# Patient Record
Sex: FEMALE | Race: WHITE | NOT HISPANIC OR LATINO | URBAN - METROPOLITAN AREA
[De-identification: names, ages, dates, MRNs, and addresses within clinical notes are randomized per-mention and may not be internally consistent; named-entity substitution may affect disease eponyms.]

---

## 2017-01-13 ENCOUNTER — APPOINTMENT (RX ONLY)
Dept: URBAN - METROPOLITAN AREA CLINIC 24 | Facility: CLINIC | Age: 77
Setting detail: DERMATOLOGY
End: 2017-01-13

## 2017-01-13 DIAGNOSIS — L82.0 INFLAMED SEBORRHEIC KERATOSIS: ICD-10-CM

## 2017-01-13 DIAGNOSIS — L57.0 ACTINIC KERATOSIS: ICD-10-CM

## 2017-01-13 PROBLEM — I25.10 ATHEROSCLEROTIC HEART DISEASE OF NATIVE CORONARY ARTERY WITHOUT ANGINA PECTORIS: Status: ACTIVE | Noted: 2017-01-13

## 2017-01-13 PROCEDURE — ? OTHER

## 2017-01-13 PROCEDURE — ? LIQUID NITROGEN

## 2017-01-13 PROCEDURE — 17110 DESTRUCTION B9 LES UP TO 14: CPT

## 2017-01-13 PROCEDURE — 17000 DESTRUCT PREMALG LESION: CPT | Mod: 59

## 2017-01-13 PROCEDURE — 17003 DESTRUCT PREMALG LES 2-14: CPT

## 2017-01-13 ASSESSMENT — LOCATION DETAILED DESCRIPTION DERM
LOCATION DETAILED: RIGHT DISTAL RADIAL DORSAL FOREARM
LOCATION DETAILED: RIGHT PROXIMAL RADIAL DORSAL FOREARM
LOCATION DETAILED: LEFT DISTAL PRETIBIAL REGION
LOCATION DETAILED: RIGHT PROXIMAL DORSAL FOREARM

## 2017-01-13 ASSESSMENT — LOCATION SIMPLE DESCRIPTION DERM
LOCATION SIMPLE: LEFT PRETIBIAL REGION
LOCATION SIMPLE: RIGHT FOREARM

## 2017-01-13 ASSESSMENT — LOCATION ZONE DERM
LOCATION ZONE: LEG
LOCATION ZONE: ARM

## 2017-01-13 NOTE — PROCEDURE: OTHER
Detail Level: Simple
Note Text (......Xxx Chief Complaint.): This diagnosis correlates with the
Other (Free Text): We plan on serial cryo to these in addition to any AKs not cleared by efudex

## 2017-01-13 NOTE — PROCEDURE: LIQUID NITROGEN
Render Post-Care Instructions In Note?: no
Duration Of Freeze Thaw-Cycle (Seconds): 5
Medical Necessity Clause: This procedure was medically necessary because the lesions was irritated and itchy.inflamed, bleeding.
Consent: The patient's consent was obtained including but not limited to risks of crusting, scabbing, blistering, scarring, darker or lighter pigmentary change, recurrence, incomplete removal and infection.
Post-Care Instructions: I reviewed with the patient in detail post-care instructions. Patient is to wear sunprotection, and avoid picking at any of the treated lesions. Pt may apply Vaseline to crusted or scabbing areas.
Medical Necessity Information: It is in your best interest to select a reason for this procedure from the list below. All of these items fulfill various CMS LCD requirements except the new and changing color options.
Detail Level: Simple
Duration Of Freeze Thaw-Cycle (Seconds): 3
Detail Level: Detailed
Number Of Freeze-Thaw Cycles: 1 freeze-thaw cycle

## 2017-05-18 ENCOUNTER — HOSPITAL ENCOUNTER (OUTPATIENT)
Dept: MAMMOGRAPHY | Age: 77
Discharge: HOME | End: 2017-05-18
Attending: INTERNAL MEDICINE
Payer: MEDICARE

## 2017-05-18 DIAGNOSIS — Z12.31 VISIT FOR SCREENING MAMMOGRAM: ICD-10-CM

## 2017-05-18 PROCEDURE — 77067 SCR MAMMO BI INCL CAD: CPT

## 2018-05-08 PROBLEM — Z79.01 LONG TERM (CURRENT) USE OF ANTICOAGULANTS: Status: ACTIVE | Noted: 2018-05-08

## 2018-10-17 ENCOUNTER — HOSPITAL ENCOUNTER (OUTPATIENT)
Dept: LAB | Age: 78
Discharge: HOME | End: 2018-10-17
Attending: INTERNAL MEDICINE
Payer: MEDICARE

## 2018-10-17 DIAGNOSIS — I48.0 PAROXYSMAL ATRIAL FIBRILLATION (HCC): ICD-10-CM

## 2018-10-17 LAB
INR PPP: 3.6
PROTHROMBIN TIME: 35 SEC (ref 11.5–14.5)

## 2018-10-17 PROCEDURE — 36415 COLL VENOUS BLD VENIPUNCTURE: CPT

## 2018-10-17 PROCEDURE — 85610 PROTHROMBIN TIME: CPT

## 2019-07-19 ENCOUNTER — HOSPITAL ENCOUNTER (EMERGENCY)
Age: 79
Discharge: HOME | End: 2019-07-19
Attending: EMERGENCY MEDICINE
Payer: MEDICARE

## 2019-07-19 VITALS
RESPIRATION RATE: 20 BRPM | OXYGEN SATURATION: 97 % | TEMPERATURE: 97.8 F | DIASTOLIC BLOOD PRESSURE: 65 MMHG | BODY MASS INDEX: 24.43 KG/M2 | WEIGHT: 152 LBS | HEIGHT: 66 IN | SYSTOLIC BLOOD PRESSURE: 149 MMHG | HEART RATE: 69 BPM

## 2019-07-19 DIAGNOSIS — R07.89 ATYPICAL CHEST PAIN: Primary | ICD-10-CM

## 2019-07-19 LAB
ALBUMIN SERPL-MCNC: 3.2 G/DL (ref 3.2–4.6)
ALBUMIN/GLOB SERPL: 0.9 {RATIO} (ref 1.2–3.5)
ALP SERPL-CCNC: 69 U/L (ref 50–136)
ALT SERPL-CCNC: 17 U/L (ref 12–65)
ANION GAP SERPL CALC-SCNC: 7 MMOL/L (ref 7–16)
AST SERPL-CCNC: 32 U/L (ref 15–37)
ATRIAL RATE: 70 BPM
BASOPHILS # BLD: 0.1 K/UL (ref 0–0.2)
BASOPHILS NFR BLD: 1 % (ref 0–2)
BILIRUB SERPL-MCNC: 0.5 MG/DL (ref 0.2–1.1)
BUN SERPL-MCNC: 13 MG/DL (ref 8–23)
CALCIUM SERPL-MCNC: 8.6 MG/DL (ref 8.3–10.4)
CALCULATED P AXIS, ECG09: 75 DEGREES
CALCULATED R AXIS, ECG10: -63 DEGREES
CALCULATED T AXIS, ECG11: 94 DEGREES
CHLORIDE SERPL-SCNC: 108 MMOL/L (ref 98–107)
CO2 SERPL-SCNC: 27 MMOL/L (ref 21–32)
CREAT SERPL-MCNC: 0.82 MG/DL (ref 0.6–1)
DIAGNOSIS, 93000: NORMAL
DIFFERENTIAL METHOD BLD: ABNORMAL
EOSINOPHIL # BLD: 0.3 K/UL (ref 0–0.8)
EOSINOPHIL NFR BLD: 5 % (ref 0.5–7.8)
ERYTHROCYTE [DISTWIDTH] IN BLOOD BY AUTOMATED COUNT: 16.9 % (ref 11.9–14.6)
GLOBULIN SER CALC-MCNC: 3.5 G/DL (ref 2.3–3.5)
GLUCOSE SERPL-MCNC: 91 MG/DL (ref 65–100)
HCT VFR BLD AUTO: 35.4 % (ref 35.8–46.3)
HGB BLD-MCNC: 11.1 G/DL (ref 11.7–15.4)
IMM GRANULOCYTES # BLD AUTO: 0 K/UL (ref 0–0.5)
IMM GRANULOCYTES NFR BLD AUTO: 0 % (ref 0–5)
LYMPHOCYTES # BLD: 1.4 K/UL (ref 0.5–4.6)
LYMPHOCYTES NFR BLD: 25 % (ref 13–44)
MCH RBC QN AUTO: 28.3 PG (ref 26.1–32.9)
MCHC RBC AUTO-ENTMCNC: 31.4 G/DL (ref 31.4–35)
MCV RBC AUTO: 90.3 FL (ref 79.6–97.8)
MONOCYTES # BLD: 0.3 K/UL (ref 0.1–1.3)
MONOCYTES NFR BLD: 5 % (ref 4–12)
NEUTS SEG # BLD: 3.7 K/UL (ref 1.7–8.2)
NEUTS SEG NFR BLD: 64 % (ref 43–78)
NRBC # BLD: 0 K/UL (ref 0–0.2)
P-R INTERVAL, ECG05: 136 MS
PLATELET # BLD AUTO: 220 K/UL (ref 150–450)
PMV BLD AUTO: 9.8 FL (ref 9.4–12.3)
POTASSIUM SERPL-SCNC: 3.9 MMOL/L (ref 3.5–5.1)
PROT SERPL-MCNC: 6.7 G/DL (ref 6.3–8.2)
Q-T INTERVAL, ECG07: 496 MS
QRS DURATION, ECG06: 172 MS
QTC CALCULATION (BEZET), ECG08: 535 MS
RBC # BLD AUTO: 3.92 M/UL (ref 4.05–5.2)
SODIUM SERPL-SCNC: 142 MMOL/L (ref 136–145)
TROPONIN I BLD-MCNC: 0 NG/ML (ref 0.02–0.05)
TROPONIN I SERPL-MCNC: <0.02 NG/ML (ref 0.02–0.05)
VENTRICULAR RATE, ECG03: 70 BPM
WBC # BLD AUTO: 5.8 K/UL (ref 4.3–11.1)

## 2019-07-19 PROCEDURE — 84484 ASSAY OF TROPONIN QUANT: CPT

## 2019-07-19 PROCEDURE — 85025 COMPLETE CBC W/AUTO DIFF WBC: CPT

## 2019-07-19 PROCEDURE — 99285 EMERGENCY DEPT VISIT HI MDM: CPT | Performed by: EMERGENCY MEDICINE

## 2019-07-19 PROCEDURE — 80053 COMPREHEN METABOLIC PANEL: CPT

## 2019-07-19 PROCEDURE — 93005 ELECTROCARDIOGRAM TRACING: CPT | Performed by: EMERGENCY MEDICINE

## 2019-07-19 NOTE — ED TRIAGE NOTES
Pt ambulatory to triage without complications. Pt reports left sided chest tightness that is also like a pressure and is intermittent and radiates to left neck and in the mid back/ shoulder blades. Pt states this started at about 0600. Pt has taken 2NTG with decrease in pain, but not resolution of pain.  EKG completed and given to Dr. Brandy Turner.

## 2019-07-19 NOTE — ED NOTES
I have reviewed discharge instructions with the patient and spouse. The patient and spouse verbalized understanding. Patient left ED via Discharge Method: ambulatory to Home with spouse. Opportunity for questions and clarification provided. Patient given 0 scripts. No e-sign        To continue your aftercare when you leave the hospital, you may receive an automated call from our care team to check in on how you are doing. This is a free service and part of our promise to provide the best care and service to meet your aftercare needs.  If you have questions, or wish to unsubscribe from this service please call [phone]. Thank you for Choosing our [facility] Department.

## 2019-07-19 NOTE — DISCHARGE INSTRUCTIONS
As we discussed, we did not find the exact cause of your symptoms today in the emergency department. Therefore, it is important for you to follow up with your primary care doctor for reevaluation. Please return with any fevers, worsening symptoms, or additional concerns.

## 2019-07-19 NOTE — ED PROVIDER NOTES
78-year-old lady presents with concerns about pain that started between 5 and 6 this morning. She said it started as a tightness in her chest.  She said she has a history of 2 previous stents that were several years ago. She has a pacemaker for A. Fib. She denies any fevers or chills but did have some nausea with the symptoms. She denies any actual vomiting and has had no diaphoresis or difficulty breathing. She took 2 nitroglycerin and her pain significantly improved she said it did not completely go away. She said currently it would be a one or 2 out of 10. She said her pain does not radiate anywhere and she did not try any additional medicines to help it. Elements of this note were created using speech recognition software. As such, errors of speech recognition may be present. Past Medical History:   Diagnosis Date    Accelerated hypertension 8/20/2014    Aortic valve insufficiency 8/20/2014    Arrhythmia     at [facility]    Atrial [facility] 8/20/2014    CAD (coronary artery disease)     Cancer (HCC)     basal    Congestive heart failure (CHF) ([facility] 10/29/2015    Coronary atherosclerosis 10/29/2015    GERD (gastroesophageal reflux disease)     Hypertension     Hypokalemia 8/20/2014    Pacemaker 8/20/2014    Thyroid disease     Unstable angina ([facility] 8/20/2014       Past Surgical History:   Procedure Laterality Date    ABDOMEN SURGERY PROC UNLISTED      gallbladder    CARDIAC SURG PROCEDURE UNLIST      card cath stents,at fib ablation    HX BREAST BIOPSY Left     Lt breast approx. 25 yrs ago.     HX GYN      total hysterectomy    HX OTHER SURGICAL      nephrectomy  3rd kidney    HX PACEMAKER           Family History:   Problem Relation Age of Onset    Heart Disease Mother     Heart Disease Brother     Heart Disease Brother     Breast Cancer Neg Hx        Social History     Socioeconomic History    Marital status:      Spouse name: Not on file    Number of children: Not on file    Years of education: Not on file    Highest education level: Not on file   Occupational History    Not on file   Social Needs    Financial resource strain: Not on file    Food insecurity:     Worry: Not on file     Inability: Not on file    Transportation needs:     Medical: Not on file     Non-medical: Not on file   Tobacco Use    Smoking status: Former Smoker     Last attempt to quit: 10/29/1985     Years since quittin.7    Smokeless tobacco: Never Used   Substance and Sexual Activity    Alcohol use: No    Drug use: Not on file    Sexual activity: Not on file   Lifestyle    Physical activity:     Days per week: Not on file     Minutes per session: Not on file    Stress: Not on file   Relationships    Social connections:     Talks on phone: Not on file     Gets together: Not on file     Attends Tenriism service: Not on file     Active member of club or organization: Not on file     Attends meetings of clubs or organizations: Not on file     Relationship status: Not on file    Intimate partner violence:     Fear of current or ex partner: Not on file     Emotionally abused: Not on file     Physically abused: Not on file     Forced sexual activity: Not on file   Other Topics Concern    Not on file   Social History Narrative    Not on file         ALLERGIES: Atorvastatin; Codeine; Dilaudid [hydromorphone]; Morphine; Simvastatin; and Sulfa (sulfonamide antibiotics)    Review of Systems   Constitutional: Negative for chills, diaphoresis and fever. HENT: Negative for congestion, rhinorrhea and sore throat. Eyes: Negative for redness and visual disturbance. Respiratory: Negative for cough, chest tightness, shortness of breath and wheezing. Cardiovascular: Positive for chest pain. Negative for palpitations. Gastrointestinal: Negative for abdominal pain, blood in stool, diarrhea, nausea and vomiting. Endocrine: Negative for polydipsia and polyuria.    Genitourinary: Negative for dysuria and hematuria. Musculoskeletal: Negative for arthralgias, myalgias and neck stiffness. Skin: Negative for rash. Allergic/Immunologic: Negative for environmental allergies and food allergies. Neurological: Negative for dizziness, weakness and headaches. Hematological: Negative for adenopathy. Does not bruise/bleed easily. Psychiatric/Behavioral: Negative for confusion and sleep disturbance. The patient is not nervous/anxious. Vitals:    07/19/19 1017   BP: 150/79   Pulse: 71   Resp: 16   Temp: 97.8 °F (36.6 °C)   SpO2: 98%   Weight: 68.9 kg (152 lb)   Height: 5' 6\" (1.676 m)            Physical Exam   Constitutional: She is oriented to person, place, and time. She appears well-developed and well-nourished. HENT:   Head: Normocephalic and atraumatic. Mouth/Throat: Oropharynx is clear and moist.   Eyes: Pupils are equal, round, and reactive to light. Conjunctivae are normal. Right eye exhibits no discharge. Left eye exhibits no discharge. Neck: No thyromegaly present. Cardiovascular: Normal rate, regular rhythm and normal heart sounds. No murmur heard. Rhythm is paced   Pulmonary/Chest: Effort normal and breath sounds normal.   Abdominal: Soft. Bowel sounds are normal. There is no tenderness. There is no rebound and no guarding. Musculoskeletal: Normal range of motion. She exhibits no edema. Neurological: She is alert and oriented to person, place, and time. She exhibits normal muscle tone. Coordination normal.   Skin: Skin is warm and dry. Psychiatric: She has a normal mood and affect. Her behavior is normal.        MDM  Number of Diagnoses or Management Options  Diagnosis management comments: Initial EKG shows no acute ischemic changes but it is paced. I will check her troponin. 1:59 PM  Repeat blood work is negative. I will discharge her home.          Procedures

## 2019-08-23 ENCOUNTER — HOSPITAL ENCOUNTER (OUTPATIENT)
Dept: LAB | Age: 79
Discharge: HOME | End: 2019-08-23
Attending: INTERNAL MEDICINE
Payer: MEDICARE

## 2019-08-23 DIAGNOSIS — R94.39 ABNORMAL NUCLEAR STRESS TEST: ICD-10-CM

## 2019-08-23 DIAGNOSIS — I25.118 CORONARY ARTERY DISEASE INVOLVING NATIVE CORONARY ARTERY OF NATIVE HEART WITH OTHER FORM OF ANGINA PECTORIS (HCC): ICD-10-CM

## 2019-08-23 LAB
ANION GAP SERPL CALC-SCNC: 7 MMOL/L (ref 7–16)
BUN SERPL-MCNC: 11 MG/DL (ref 8–23)
CALCIUM SERPL-MCNC: 8.9 MG/DL (ref 8.3–10.4)
CHLORIDE SERPL-SCNC: 105 MMOL/L (ref 98–107)
CO2 SERPL-SCNC: 32 MMOL/L (ref 21–32)
CREAT SERPL-MCNC: 0.9 MG/DL (ref 0.6–1)
GLUCOSE SERPL-MCNC: 82 MG/DL (ref 65–100)
POTASSIUM SERPL-SCNC: 3.4 MMOL/L (ref 3.5–5.1)
SODIUM SERPL-SCNC: 144 MMOL/L (ref 136–145)

## 2019-08-23 PROCEDURE — 36415 COLL VENOUS BLD VENIPUNCTURE: CPT

## 2019-08-23 PROCEDURE — 80048 BASIC METABOLIC PNL TOTAL CA: CPT

## 2019-08-27 NOTE — PROGRESS NOTES
Patient pre-assessment complete for [facility] poss with Dr Tonja Pizarro scheduled for 19 at 11am, arrival time 9am. Patient verified using . Patient instructed to bring all home medications in labeled bottles on the day of procedure. NPO status reinforced. Patient informed to take a full dose aspirin 325mg  or 81 mg x 4 on the day of procedure. Patient instructed to HOLD coumadin (last dose 19) & HOLD lasix & losartan/HCTZ in am . Instructed they can take all other medications excluding vitamins & supplements. Patient verbalizes understanding of all instructions & denies any questions at this time.

## 2019-08-28 ENCOUNTER — HOSPITAL ENCOUNTER (OUTPATIENT)
Dept: CARDIAC CATH/INVASIVE PROCEDURES | Age: 79
Discharge: HOME | End: 2019-08-28
Attending: INTERNAL MEDICINE | Admitting: INTERNAL MEDICINE
Payer: MEDICARE

## 2019-08-28 VITALS
DIASTOLIC BLOOD PRESSURE: 65 MMHG | RESPIRATION RATE: 18 BRPM | OXYGEN SATURATION: 98 % | BODY MASS INDEX: 25.71 KG/M2 | SYSTOLIC BLOOD PRESSURE: 140 MMHG | HEART RATE: 71 BPM | WEIGHT: 160 LBS | HEIGHT: 66 IN | TEMPERATURE: 98.1 F

## 2019-08-28 PROBLEM — E78.5 DYSLIPIDEMIA: Chronic | Status: ACTIVE | Noted: 2019-08-28

## 2019-08-28 LAB
ATRIAL RATE: 70 BPM
CALCULATED P AXIS, ECG09: 93 DEGREES
CALCULATED R AXIS, ECG10: -47 DEGREES
CALCULATED T AXIS, ECG11: 89 DEGREES
DIAGNOSIS, 93000: NORMAL
ERYTHROCYTE [DISTWIDTH] IN BLOOD BY AUTOMATED COUNT: 18 % (ref 11.9–14.6)
HCT VFR BLD AUTO: 40.1 % (ref 35.8–46.3)
HGB BLD-MCNC: 12.4 G/DL (ref 11.7–15.4)
INR PPP: 1.5
MCH RBC QN AUTO: 28.4 PG (ref 26.1–32.9)
MCHC RBC AUTO-ENTMCNC: 30.9 G/DL (ref 31.4–35)
MCV RBC AUTO: 91.8 FL (ref 79.6–97.8)
NRBC # BLD: 0 K/UL (ref 0–0.2)
P-R INTERVAL, ECG05: 188 MS
PLATELET # BLD AUTO: 265 K/UL (ref 150–450)
PMV BLD AUTO: 9.6 FL (ref 9.4–12.3)
PROTHROMBIN TIME: 19 SEC (ref 11.7–14.5)
Q-T INTERVAL, ECG07: 512 MS
QRS DURATION, ECG06: 160 MS
QTC CALCULATION (BEZET), ECG08: 552 MS
RBC # BLD AUTO: 4.37 M/UL (ref 4.05–5.2)
VENTRICULAR RATE, ECG03: 70 BPM
WBC # BLD AUTO: 8.3 K/UL (ref 4.3–11.1)

## 2019-08-28 PROCEDURE — 85027 COMPLETE CBC AUTOMATED: CPT

## 2019-08-28 PROCEDURE — C1769 GUIDE WIRE: HCPCS

## 2019-08-28 PROCEDURE — 85610 PROTHROMBIN TIME: CPT

## 2019-08-28 PROCEDURE — 74011636320 HC RX REV CODE- 636/320: Performed by: INTERNAL MEDICINE

## 2019-08-28 PROCEDURE — 74011250637 HC RX REV CODE- 250/637: Performed by: INTERNAL MEDICINE

## 2019-08-28 PROCEDURE — 77030015766

## 2019-08-28 PROCEDURE — 74011250636 HC RX REV CODE- 250/636: Performed by: INTERNAL MEDICINE

## 2019-08-28 PROCEDURE — 74011250636 HC RX REV CODE- 250/636

## 2019-08-28 PROCEDURE — 93005 ELECTROCARDIOGRAM TRACING: CPT | Performed by: INTERNAL MEDICINE

## 2019-08-28 PROCEDURE — 99152 MOD SED SAME PHYS/QHP 5/>YRS: CPT

## 2019-08-28 PROCEDURE — C1894 INTRO/SHEATH, NON-LASER: HCPCS

## 2019-08-28 PROCEDURE — 99153 MOD SED SAME PHYS/QHP EA: CPT

## 2019-08-28 PROCEDURE — 77030019569 HC BND COMPR RAD TERU -B

## 2019-08-28 PROCEDURE — 77030004534 HC CATH ANGI DX INFN CARD -A

## 2019-08-28 PROCEDURE — 74011000250 HC RX REV CODE- 250: Performed by: INTERNAL MEDICINE

## 2019-08-28 PROCEDURE — 93458 L HRT ARTERY/VENTRICLE ANGIO: CPT

## 2019-08-28 RX ORDER — SODIUM CHLORIDE 0.9 % (FLUSH) 0.9 %
5-40 SYRINGE (ML) INJECTION AS NEEDED
Status: DISCONTINUED | OUTPATIENT
Start: 2019-08-28 | End: 2019-08-28 | Stop reason: HOSPADM

## 2019-08-28 RX ORDER — LIDOCAINE HYDROCHLORIDE 10 MG/ML
2-20 INJECTION INFILTRATION; PERINEURAL
Status: DISCONTINUED | OUTPATIENT
Start: 2019-08-28 | End: 2019-08-28 | Stop reason: HOSPADM

## 2019-08-28 RX ORDER — SODIUM CHLORIDE 0.9 % (FLUSH) 0.9 %
5-40 SYRINGE (ML) INJECTION EVERY 8 HOURS
Status: DISCONTINUED | OUTPATIENT
Start: 2019-08-28 | End: 2019-08-28 | Stop reason: HOSPADM

## 2019-08-28 RX ORDER — MIDAZOLAM HYDROCHLORIDE 1 MG/ML
.5-2 INJECTION, SOLUTION INTRAMUSCULAR; INTRAVENOUS
Status: DISCONTINUED | OUTPATIENT
Start: 2019-08-28 | End: 2019-08-28 | Stop reason: HOSPADM

## 2019-08-28 RX ORDER — GUAIFENESIN 100 MG/5ML
81-324 LIQUID (ML) ORAL ONCE
Status: DISCONTINUED | OUTPATIENT
Start: 2019-08-28 | End: 2019-08-28 | Stop reason: HOSPADM

## 2019-08-28 RX ORDER — DIAZEPAM 5 MG/1
5 TABLET ORAL ONCE
Status: COMPLETED | OUTPATIENT
Start: 2019-08-28 | End: 2019-08-28

## 2019-08-28 RX ORDER — FENTANYL CITRATE 50 UG/ML
25-100 INJECTION, SOLUTION INTRAMUSCULAR; INTRAVENOUS
Status: DISCONTINUED | OUTPATIENT
Start: 2019-08-28 | End: 2019-08-28 | Stop reason: HOSPADM

## 2019-08-28 RX ORDER — SODIUM CHLORIDE 9 MG/ML
75 INJECTION, SOLUTION INTRAVENOUS CONTINUOUS
Status: DISCONTINUED | OUTPATIENT
Start: 2019-08-28 | End: 2019-08-28 | Stop reason: HOSPADM

## 2019-08-28 RX ORDER — SODIUM CHLORIDE 9 MG/ML
250 INJECTION, SOLUTION INTRAVENOUS CONTINUOUS
Status: CANCELLED | OUTPATIENT
Start: 2019-08-28 | End: 2019-08-28

## 2019-08-28 RX ORDER — SODIUM CHLORIDE 0.9 % (FLUSH) 0.9 %
5-40 SYRINGE (ML) INJECTION EVERY 8 HOURS
Status: CANCELLED | OUTPATIENT
Start: 2019-08-28

## 2019-08-28 RX ORDER — HEPARIN SODIUM 200 [USP'U]/100ML
2 INJECTION, SOLUTION INTRAVENOUS CONTINUOUS
Status: DISCONTINUED | OUTPATIENT
Start: 2019-08-28 | End: 2019-08-28 | Stop reason: HOSPADM

## 2019-08-28 RX ORDER — SODIUM CHLORIDE 0.9 % (FLUSH) 0.9 %
5-40 SYRINGE (ML) INJECTION AS NEEDED
Status: CANCELLED | OUTPATIENT
Start: 2019-08-28

## 2019-08-28 RX ORDER — SODIUM CHLORIDE 9 MG/ML
250 INJECTION, SOLUTION INTRAVENOUS CONTINUOUS
Status: ACTIVE | OUTPATIENT
Start: 2019-08-28 | End: 2019-08-28

## 2019-08-28 RX ADMIN — FENTANYL CITRATE 50 MCG: 50 INJECTION, SOLUTION INTRAMUSCULAR; INTRAVENOUS at 11:21

## 2019-08-28 RX ADMIN — IOPAMIDOL 60 ML: 755 INJECTION, SOLUTION INTRAVENOUS at 11:37

## 2019-08-28 RX ADMIN — HEPARIN SODIUM 2 ML: 10000 INJECTION, SOLUTION INTRAVENOUS; SUBCUTANEOUS at 11:30

## 2019-08-28 RX ADMIN — LIDOCAINE HYDROCHLORIDE 4 ML: 10 INJECTION, SOLUTION INFILTRATION; PERINEURAL at 11:28

## 2019-08-28 RX ADMIN — HEPARIN SODIUM 2 ML/HR: 200 INJECTION, SOLUTION INTRAVENOUS at 11:18

## 2019-08-28 RX ADMIN — SODIUM CHLORIDE 75 ML/HR: 900 INJECTION, SOLUTION INTRAVENOUS at 09:37

## 2019-08-28 RX ADMIN — DIAZEPAM 5 MG: 5 TABLET ORAL at 09:44

## 2019-08-28 RX ADMIN — MIDAZOLAM HYDROCHLORIDE 2 MG: 1 INJECTION, SOLUTION INTRAMUSCULAR; INTRAVENOUS at 11:21

## 2019-08-28 RX ADMIN — MIDAZOLAM HYDROCHLORIDE 2 MG: 1 INJECTION, SOLUTION INTRAMUSCULAR; INTRAVENOUS at 11:30

## 2019-08-28 NOTE — PROGRESS NOTES
TRANSFER - OUT REPORT:    Verbal report given to Brianda RN(name) on [facility]  being transferred to [address] [place] [address] (unit) for routine progression of care       Report consisted of patients Situation, Background, Assessment and   Recommendations(SBAR). Information from the following report(s) Procedure Summary, MAR and Cardiac Rhythm NSR was reviewed with the receiving nurse. Lines:   Peripheral IV 08/28/19 Left Antecubital (Active)        Opportunity for questions and clarification was provided.       Patient transported with:   Registered Nurse     [facility] Edna Dickinson only  Right radial access - TR band @ 1139 w/ 13 ml air in band - site C/D/I  Versed 4 mg IV  Fent 50 mcg IV

## 2019-08-28 NOTE — PROGRESS NOTES
TRANSFER - IN REPORT:    Verbal report received from carly RN(name) on [facility]  being received from cath lab(unit) for routine progression of care      Report consisted of patients Situation, Background, Assessment and   Recommendations(SBAR). Information from the following report(s) Procedure Summary was reviewed with the receiving nurse. Opportunity for questions and clarification was provided. Assessment completed upon patients arrival to unit and care assumed.

## 2019-08-28 NOTE — PROGRESS NOTES
Pt arrived, ambulated to room with no visible problems, [facility] Consent signed, Procedure discussed with pt all questions answered voiced understanding. Medications and history discussed with pt. Pt prepped per ordersThe patient has a fraility score of 4-VULNERABLE, based on ability to complete ADLs without assistance, increased symptoms on exertion.       Patient took Aspirin 324mg today at 0800 prior to arrival.

## 2019-08-28 NOTE — DISCHARGE INSTRUCTIONS
HEART CATHETERIZATION/ANGIOGRAPHY DISCHARGE INSTRUCTIONS    1. Check puncture site frequently for swelling or bleeding. If there is any bleeding, lie down and apply pressure over the area with a clean towel or washcloth. Call 911. Notify your doctor for any redness, swelling, drainage, or oozing from the puncture site. Notify your doctor for any fever or chills. 2. If the extremity becomes cold, numb, or painful call [address] Cardiology at [phone].  3. Activity should be limited for the next 48 hours. Climb stairs as little as possible and avoid any stooping, bending, or strenuous activity for 48 hours. No heavy lifting (anything over 10 pounds) for 3 days. 4. You may resume your usual diet. Drink more fluids than usual.  5. Have a responsible person drive you home and stay with you for at least 24 hours after your heart catheterization/angiography. Do not drive for the next 24 hours. 6. You may remove bandage from your Right wrist in 24 hours. You may shower in 24 hours. No tub baths, hot tubs, or swimming for 1 week. Do not place any lotions, creams, powders, or ointments over puncture site for 1 week. You may place a clean band-aid over the puncture site each day for 5 days. Change daily. 7. Please continue your medications as prescribed by your physician. I have read the above instructions and have had the opportunity to ask questions.       Patient: ________________________   Date: 8/28/2019    Witness: _______________________   Date: 8/28/2019

## 2019-08-28 NOTE — PROCEDURES
Brief Cardiac Procedure Note    Patient: Paige Elias MRN: [id number]  SSN: [id number]    YOB: 1940  Age: 78 y.o. Sex: female      Date of Procedure: 8/28/2019     Pre-procedure Diagnosis: Atypical Angina    Post-procedure Diagnosis: Non-cardiac Chest Pain    Procedure: Left Heart Catheterization    Brief Description of Procedure: See note    Performed By: Heriberto Styles MD     Assistants: None    Anesthesia: Moderate Sedation    Estimated Blood Loss: Less than 10 mL      Specimens: None    Implants: None    Findings:   LV:  EF 65%  LM:  NML  LAD:  Stent in prox/mid is patent, 30-40% distal   LCx:  NML  RCA:  NML    Complications: None    Recommendations: Continue medical therapy.     Signed By: Heriberto Styles MD     August 28, 2019

## 2019-08-28 NOTE — H&P
[facility] CARDIOLOGY History &Physical                Primary Cardiologist: Dr Valeria Vázquez    Primary Care Physician:  Antoinette Mack MD    Subjective:     Patient is a 78 y.o. female presents with CP and stress testing with anterior ischemia. She is referred for cardiac catheterization. She has a history of CAD and prior PCI.      Past Medical History:   Diagnosis Date    Accelerated hypertension 8/20/2014    Aortic valve insufficiency 8/20/2014    Arrhythmia     at fib    Arthritis     [facility] 8/20/2014    CAD (coronary artery disease)     Cancer (HCC)     basal    Congestive heart failure (CHF) ([facility] 10/29/2015    Coronary atherosclerosis 10/29/2015    GERD (gastroesophageal reflux disease)     Hypertension     Hypokalemia 8/20/2014    Nausea & vomiting     Pacemaker 8/20/2014    Thyroid disease     Unstable angina (HCC) 8/20/2014        Current Facility-Administered Medications:     0.9% sodium chloride infusion, 75 mL/hr, IntraVENous, CONTINUOUS, Joel Chavez MD, Last Rate: 75 mL/hr at 08/28/19 0937, 75 mL/hr at 08/28/19 7863    aspirin chewable tablet  mg,  mg, Oral, ONCE, Nikhil Dhaliwal MD, Stopped at 08/28/19 1000    fentaNYL citrate (PF) injection  mcg,  mcg, IntraVENous, Multiple, Joel Chavez MD, 50 mcg at 08/28/19 1121    heparin (PF) 2 units/ml in NS infusion, 2 mL/hr, IntraarTERial, CONTINUOUS, Joel Chavez MD, Last Rate: 2 mL/hr at 08/28/19 1118, 2 mL/hr at 08/28/19 1118    iopamidol (ISOVUE-370) 76 % injection  mL,  mL, IntraarTERial, Scott Ochoa Mark A, MD, 60 mL at 08/28/19 1137    lidocaine (XYLOCAINE) 10 mg/mL (1 %) injection 2-20 mL, 2-20 mL, IntraDERMal, Scott Ochoa Mark A, MD, 4 mL at 08/28/19 1128    midazolam (VERSED) injection 0.5-2 mg, 0.5-2 mg, IntraVENous, Scott Ochoa Mark A, MD, 2 mg at 08/28/19 1130  Allergies   Allergen Reactions    Atorvastatin Myalgia    Codeine Nausea Only    Dilaudid [Hydromorphone] Unknown (comments)    Morphine Unknown (comments)    Simvastatin Myalgia    Sulfa (Sulfonamide Antibiotics) Swelling     Social History     Tobacco Use    Smoking status: Former Smoker     Last attempt to quit: 10/29/1985     Years since quittin.8    Smokeless tobacco: Never Used   Substance Use Topics    Alcohol use: No      Family History   Problem Relation Age of Onset    Heart Disease Mother     Heart Disease Brother     Heart Disease Brother     Breast Cancer Neg Hx         Review of Systems    Review of Systems   Constitution: Positive for malaise/fatigue. Negative for chills and fever. HENT: Negative for hearing loss. Eyes: Negative for visual disturbance. Cardiovascular: Positive for chest pain. Negative for dyspnea on exertion and palpitations. Respiratory: Negative for cough, shortness of breath and wheezing. Skin: Negative for rash. Musculoskeletal: Negative for back pain, muscle cramps and muscle weakness. Gastrointestinal: Negative for constipation, diarrhea and nausea. Genitourinary: Negative for dysuria. Neurological: Negative for dizziness and headaches. Psychiatric/Behavioral: Negative for depression. Objective:       Visit Vitals  /63 (BP 1 Location: Left arm, BP Patient Position: At rest)   Pulse 69   Temp 98.1 °F (36.7 °C)   Resp 18   Ht 5' 6\" (1.676 m)   Wt 72.6 kg (160 lb)   SpO2 99%   Breastfeeding? No   BMI 25.82 kg/m²       No intake/output data recorded. No intake/output data recorded. Physical Exam   Constitutional: She is oriented to person, place, and time. She appears well-developed and well-nourished. HENT:   Head: Normocephalic and atraumatic. Eyes: Pupils are equal, round, and reactive to light. Conjunctivae are normal.   Neck: Neck supple. No thyromegaly present. Cardiovascular: Normal rate, regular rhythm and normal heart sounds.    Pulmonary/Chest: Breath sounds normal. Abdominal: Soft. Bowel sounds are normal.   Musculoskeletal: Normal range of motion. Neurological: She is alert and oriented to person, place, and time. Skin: Skin is dry. ECG: AV pacing     Data Review:     Recent Results (from the past 24 hour(s))   CBC W/O DIFF    Collection Time: 08/28/19  9:28 AM   Result Value Ref Range    WBC 8.3 4.3 - 11.1 K/uL    RBC 4.37 4.05 - 5.2 M/uL    HGB 12.4 11.7 - 15.4 g/dL    HCT 40.1 35.8 - 46.3 %    MCV 91.8 79.6 - 97.8 FL    MCH 28.4 26.1 - 32.9 PG    MCHC 30.9 (L) 31.4 - 35.0 g/dL    RDW 18.0 (H) 11.9 - 14.6 %    PLATELET 239 652 - 044 K/uL    MPV 9.6 9.4 - 12.3 FL    ABSOLUTE NRBC 0.00 0.0 - 0.2 K/uL   PROTHROMBIN TIME + INR    Collection Time: 08/28/19  9:28 AM   Result Value Ref Range    Prothrombin time 19.0 (H) 11.7 - 14.5 sec    INR 1.5     EKG, 12 LEAD, INITIAL    Collection Time: 08/28/19  9:42 AM   Result Value Ref Range    Ventricular Rate 70 BPM    Atrial Rate 70 BPM    P-R Interval 188 ms    QRS Duration 160 ms    Q-T Interval 512 ms    QTC Calculation (Bezet) 552 ms    Calculated P Axis 93 degrees    Calculated R Axis -47 degrees    Calculated T Axis 89 degrees    Diagnosis       AV dual-paced rhythm  Abnormal ECG  When compared with ECG of 19-JUL-2019 10:11,  No significant change was found  Confirmed by Kyree Napoles MD (), Jasmin Tim ([id number]) on 8/28/2019 9:47:19 AM             Assessment/Plan:   Principal Problem:    Coronary atherosclerosis (10/29/2015) - Patient with chronic CP and stress testing with anterior ischemia. Plan [facility].   Risks and benefits discussed with patient and willing proceed      Overview: remote LAD stenting   NL LV   last cath patent 2014    Active Problems:    Paroxysmal atrial fibrillation ([address] (8/20/2014) - Sotalol and warfarin       Pacemaker (8/20/2014)      Hypertension (10/29/2015)      Dyslipidemia (8/28/2019)                  Steve Westbrook MD

## 2019-08-28 NOTE — PROCEDURES
[facility]  CARDIAC CATH    Name:  Gustavo Bansal  MR#:  [id number]  :  1940  ACCOUNT #:  [de-identified]  DATE OF SERVICE:  2019    PRIMARY CARDIOLOGIST:  Chester Putnam MD    PRIMARY CARE PHYSICIAN:  Maryanne Yoo MD    BRIEF HISTORY:  The patient is an 55-year-old female with history of atherosclerotic coronary disease, who presents with recurrent chest discomfort. She had stress testing suggesting anterior ischemia, referred for cardiac catheterization. PREOPERATIVE DIAGNOSIS:  Atypical chest pain with moderate risk anterior ischemia. POSTOPERATIVE DIAGNOSIS:  Noncardiac chest pain. PROCEDURES PERFORMED:  Bilateral selective coronary angiography, left ventriculography. SURGEON:  Joel Lopez MD    ASSISTANT:  None. COMPLICATIONS:  None. ANESTHESIA:  Conscious sedation. Start time 11:21, end time 11:40. MEDICATIONS:  4 mg of Versed, 50 mcg of fentanyl. MONITORING RN:  Litzy Barroso. SPECIMENS:  None. IMPLANTS:  None. ESTIMATED BLOOD LOSS:  Less than 5 mL. PROCEDURE:  After informed consent, she was prepped and draped in the usual sterile fashion. Right wrist was infiltrated with lidocaine. Right radial artery was accessed. A 6-Zambian sheath was advanced. A 5-Zambian Tiger catheter was utilized for left and right coronary injections. A 5-Zambian angled pigtail was utilized for left ventriculography. Isovue contrast utilized. FINDINGS:  1. Left ventricle:  Normal left ventricular size. Normal left ventricular systolic function. Ejection fraction is 65%. There is no significant mitral regurgitation. No aortic valve gradient. Left ventricular end-diastolic pressure is measured at 13 mmHg. 2.  Left main:  Left main is large, bifurcates into LAD and circumflex systems, appears angiographically normal.  3.  Left anterior descending coronary: It is a moderate-sized vessel.   Stent that extends from the proximal throughout the mid remains widely patent with less than 20% in-stent restenosis. The jailed proximal diagonal is moderate in size and appears normal.  The more distal LAD has moderate calcification. Has a 20% stenosis at the distal edge of the stent. There is a 30-40% stenosis in the transition to the more apical and relatively small portion of the vessel. 4.  Left circumflex coronary: It is a moderate-sized vessel. It is moderately calcified at the bifurcation of the first obtuse marginal and ongoing AV circumflex. This is associated with no stenosis. The first obtuse marginal is moderate to large in size and appears normal.  The ongoing AV circumflex gives rise to two distal obtuse marginals, which are normal.  5.  Right coronary: It is a moderate-sized anatomically dominant vessel. Has moderate calcification. The lumen is smooth throughout its entire course, gives rise to a moderate-sized posterior descending and small posterolateral branch which appear normal.    Successful hemostasis with pneumatic radial band. CONCLUSIONS:  1. Normal left ventricular systolic function. 2.  Stable-appearing atherosclerotic coronary disease with widely patent LAD stenting, mild to moderate nonobstructive disease involving the mid to distal LAD and normal-appearing left circumflex and right coronary arteries. Thank you for allowing us to participate in the care of this patient. Any questions or concerns, please feel free to contact me.       Izzy Cannon MD      [id number]  D:  08/28/2019 11:54  T:  08/28/2019 12:01  JOB #:  [id number]  CC:  MD Wesly Crain MD

## 2019-09-05 NOTE — PROGRESS NOTES
Called informed pt of low potassium and need to stop the Hyzaar and start on Losartan 100 mg daily. Pt stated the last time she stopped the Hyzaar her feet and ankles started swelling again and had to go back on it. Stated she is taking Potassium Chloride 20 meq once daily. Also has a 10 meq that she is not currently taking. Asking if she can stay on the Hyzaar and just take the Potassium 20 meq in am and 10 meq in the pm or could just take the 20 meq twice daily. Informed pt will speak with Dr. iFfi Crawley and return her call later today or tomorrow.   Pt voiced understanding./wc

## 2019-09-05 NOTE — PROGRESS NOTES
potassium is low   lets have her stop hyzaar 50-12.5   and  start losartan 100mg a day instead.   recheck labs in a couple of weeks

## 2020-07-31 ENCOUNTER — HOSPITAL ENCOUNTER (OUTPATIENT)
Dept: MAMMOGRAPHY | Age: 80
Discharge: HOME | End: 2020-07-31
Attending: INTERNAL MEDICINE
Payer: MEDICARE

## 2020-07-31 DIAGNOSIS — N63.20 LUMP OF LEFT BREAST: ICD-10-CM

## 2020-07-31 DIAGNOSIS — N64.59 ABNORMAL BREAST FINDING: ICD-10-CM

## 2020-07-31 PROCEDURE — 77066 DX MAMMO INCL CAD BI: CPT

## 2020-07-31 PROCEDURE — 76642 ULTRASOUND BREAST LIMITED: CPT

## 2021-07-01 ENCOUNTER — HOSPITAL ENCOUNTER (INPATIENT)
Age: 81
LOS: 3 days | Discharge: HOME | DRG: 872 | End: 2021-07-04
Attending: EMERGENCY MEDICINE | Admitting: HOSPITALIST
Payer: MEDICARE

## 2021-07-01 ENCOUNTER — APPOINTMENT (OUTPATIENT)
Dept: CT IMAGING | Age: 81
DRG: 872 | End: 2021-07-01
Attending: EMERGENCY MEDICINE
Payer: MEDICARE

## 2021-07-01 DIAGNOSIS — Z79.01 CHRONIC ANTICOAGULATION: ICD-10-CM

## 2021-07-01 DIAGNOSIS — D72.829 LEUKOCYTOSIS, UNSPECIFIED TYPE: ICD-10-CM

## 2021-07-01 DIAGNOSIS — R10.32 LLQ ABDOMINAL PAIN: ICD-10-CM

## 2021-07-01 PROBLEM — K57.92 DIVERTICULITIS: Status: ACTIVE | Noted: 2021-07-01

## 2021-07-01 LAB
ALBUMIN SERPL-MCNC: 2.8 G/DL (ref 3.2–4.6)
ALBUMIN/GLOB SERPL: 0.8 {RATIO} (ref 1.2–3.5)
ALP SERPL-CCNC: 88 U/L (ref 50–136)
ALT SERPL-CCNC: 39 U/L (ref 12–65)
ANION GAP SERPL CALC-SCNC: 6 MMOL/L (ref 7–16)
AST SERPL-CCNC: 39 U/L (ref 15–37)
BACTERIA URNS QL MICRO: ABNORMAL /HPF
BASOPHILS # BLD: 0 K/UL (ref 0–0.2)
BASOPHILS NFR BLD: 0 % (ref 0–2)
BILIRUB SERPL-MCNC: 0.8 MG/DL (ref 0.2–1.1)
BUN SERPL-MCNC: 31 MG/DL (ref 8–23)
CALCIUM SERPL-MCNC: 8.5 MG/DL (ref 8.3–10.4)
CASTS URNS QL MICRO: ABNORMAL /LPF
CHLORIDE SERPL-SCNC: 102 MMOL/L (ref 98–107)
CO2 SERPL-SCNC: 29 MMOL/L (ref 21–32)
CREAT SERPL-MCNC: 1.39 MG/DL (ref 0.6–1)
CRYSTALS URNS QL MICRO: 0 /LPF
DIFFERENTIAL METHOD BLD: ABNORMAL
EOSINOPHIL # BLD: 0 K/UL (ref 0–0.8)
EOSINOPHIL NFR BLD: 0 % (ref 0.5–7.8)
EPI CELLS #/AREA URNS HPF: ABNORMAL /HPF
ERYTHROCYTE [DISTWIDTH] IN BLOOD BY AUTOMATED COUNT: 16.3 % (ref 11.9–14.6)
GLOBULIN SER CALC-MCNC: 3.5 G/DL (ref 2.3–3.5)
GLUCOSE SERPL-MCNC: 143 MG/DL (ref 65–100)
HCT VFR BLD AUTO: 39.1 % (ref 35.8–46.3)
HGB BLD-MCNC: 12.4 G/DL (ref 11.7–15.4)
IMM GRANULOCYTES # BLD AUTO: 2.5 K/UL (ref 0–0.5)
IMM GRANULOCYTES NFR BLD AUTO: 6 % (ref 0–5)
INR PPP: 2.2
LACTATE SERPL-SCNC: 3.1 MMOL/L (ref 0.4–2)
LACTATE SERPL-SCNC: 3.2 MMOL/L (ref 0.4–2)
LIPASE SERPL-CCNC: 65 U/L (ref 73–393)
LYMPHOCYTES # BLD: 1.7 K/UL (ref 0.5–4.6)
LYMPHOCYTES NFR BLD: 4 % (ref 13–44)
MCH RBC QN AUTO: 28.6 PG (ref 26.1–32.9)
MCHC RBC AUTO-ENTMCNC: 31.7 G/DL (ref 31.4–35)
MCV RBC AUTO: 90.3 FL (ref 79.6–97.8)
MONOCYTES # BLD: 2.9 K/UL (ref 0.1–1.3)
MONOCYTES NFR BLD: 7 % (ref 4–12)
MUCOUS THREADS URNS QL MICRO: ABNORMAL /LPF
NEUTS SEG # BLD: 34.9 K/UL (ref 1.7–8.2)
NEUTS SEG NFR BLD: 83 % (ref 43–78)
NRBC # BLD: 0.03 K/UL (ref 0–0.2)
OTHER OBSERVATIONS,UCOM: ABNORMAL
PLATELET # BLD AUTO: 207 K/UL (ref 150–450)
PLATELET COMMENTS,PCOM: ADEQUATE
PMV BLD AUTO: 10.4 FL (ref 9.4–12.3)
POTASSIUM SERPL-SCNC: 3.8 MMOL/L (ref 3.5–5.1)
PROT SERPL-MCNC: 6.3 G/DL (ref 6.3–8.2)
PROTHROMBIN TIME: 24.9 SEC (ref 12.5–14.7)
RBC # BLD AUTO: 4.33 M/UL (ref 4.05–5.2)
RBC #/AREA URNS HPF: ABNORMAL /HPF
RBC MORPH BLD: ABNORMAL
RBC MORPH BLD: ABNORMAL
SODIUM SERPL-SCNC: 137 MMOL/L (ref 136–145)
WBC # BLD AUTO: 42 K/UL (ref 4.3–11.1)
WBC MORPH BLD: ABNORMAL
WBC URNS QL MICRO: >100 /HPF

## 2021-07-01 PROCEDURE — 74011000258 HC RX REV CODE- 258: Performed by: HOSPITALIST

## 2021-07-01 PROCEDURE — 85025 COMPLETE CBC W/AUTO DIFF WBC: CPT

## 2021-07-01 PROCEDURE — 81003 URINALYSIS AUTO W/O SCOPE: CPT

## 2021-07-01 PROCEDURE — 99285 EMERGENCY DEPT VISIT HI MDM: CPT

## 2021-07-01 PROCEDURE — 74011250636 HC RX REV CODE- 250/636: Performed by: EMERGENCY MEDICINE

## 2021-07-01 PROCEDURE — 83690 ASSAY OF LIPASE: CPT

## 2021-07-01 PROCEDURE — 87040 BLOOD CULTURE FOR BACTERIA: CPT

## 2021-07-01 PROCEDURE — 74011000258 HC RX REV CODE- 258: Performed by: EMERGENCY MEDICINE

## 2021-07-01 PROCEDURE — 96375 TX/PRO/DX INJ NEW DRUG ADDON: CPT

## 2021-07-01 PROCEDURE — 80053 COMPREHEN METABOLIC PANEL: CPT

## 2021-07-01 PROCEDURE — 93005 ELECTROCARDIOGRAM TRACING: CPT

## 2021-07-01 PROCEDURE — 65270000029 HC RM PRIVATE

## 2021-07-01 PROCEDURE — 81015 MICROSCOPIC EXAM OF URINE: CPT

## 2021-07-01 PROCEDURE — 36415 COLL VENOUS BLD VENIPUNCTURE: CPT

## 2021-07-01 PROCEDURE — 85610 PROTHROMBIN TIME: CPT

## 2021-07-01 PROCEDURE — 83605 ASSAY OF LACTIC ACID: CPT

## 2021-07-01 PROCEDURE — 74177 CT ABD & PELVIS W/CONTRAST: CPT

## 2021-07-01 PROCEDURE — 74011250636 HC RX REV CODE- 250/636: Performed by: HOSPITALIST

## 2021-07-01 PROCEDURE — 96365 THER/PROPH/DIAG IV INF INIT: CPT

## 2021-07-01 PROCEDURE — 74011000636 HC RX REV CODE- 636: Performed by: EMERGENCY MEDICINE

## 2021-07-01 RX ORDER — CLOPIDOGREL BISULFATE 75 MG/1
75 TABLET ORAL DAILY
Status: DISCONTINUED | OUTPATIENT
Start: 2021-07-01 | End: 2021-07-04 | Stop reason: HOSPADM

## 2021-07-01 RX ORDER — SODIUM CHLORIDE 0.9 % (FLUSH) 0.9 %
5-40 SYRINGE (ML) INJECTION EVERY 8 HOURS
Status: DISCONTINUED | OUTPATIENT
Start: 2021-07-01 | End: 2021-07-04 | Stop reason: HOSPADM

## 2021-07-01 RX ORDER — SOTALOL HYDROCHLORIDE 80 MG/1
120 TABLET ORAL 2 TIMES DAILY
Status: DISCONTINUED | OUTPATIENT
Start: 2021-07-01 | End: 2021-07-01 | Stop reason: DRUGHIGH

## 2021-07-01 RX ORDER — POLYETHYLENE GLYCOL 3350 17 G/17G
17 POWDER, FOR SOLUTION ORAL DAILY PRN
Status: DISCONTINUED | OUTPATIENT
Start: 2021-07-01 | End: 2021-07-04 | Stop reason: HOSPADM

## 2021-07-01 RX ORDER — HYDROMORPHONE HYDROCHLORIDE 1 MG/ML
0.3 INJECTION, SOLUTION INTRAMUSCULAR; INTRAVENOUS; SUBCUTANEOUS
Status: COMPLETED | OUTPATIENT
Start: 2021-07-01 | End: 2021-07-01

## 2021-07-01 RX ORDER — SOTALOL HYDROCHLORIDE 80 MG/1
120 TABLET ORAL DAILY
Status: DISCONTINUED | OUTPATIENT
Start: 2021-07-02 | End: 2021-07-02

## 2021-07-01 RX ORDER — SODIUM CHLORIDE 0.9 % (FLUSH) 0.9 %
5-40 SYRINGE (ML) INJECTION AS NEEDED
Status: DISCONTINUED | OUTPATIENT
Start: 2021-07-01 | End: 2021-07-04 | Stop reason: HOSPADM

## 2021-07-01 RX ORDER — ONDANSETRON 2 MG/ML
4 INJECTION INTRAMUSCULAR; INTRAVENOUS
Status: DISCONTINUED | OUTPATIENT
Start: 2021-07-01 | End: 2021-07-04 | Stop reason: HOSPADM

## 2021-07-01 RX ORDER — ACETAMINOPHEN 650 MG/1
650 SUPPOSITORY RECTAL
Status: DISCONTINUED | OUTPATIENT
Start: 2021-07-01 | End: 2021-07-04 | Stop reason: HOSPADM

## 2021-07-01 RX ORDER — HYDROMORPHONE HYDROCHLORIDE 1 MG/ML
0.2 INJECTION, SOLUTION INTRAMUSCULAR; INTRAVENOUS; SUBCUTANEOUS
Status: DISCONTINUED | OUTPATIENT
Start: 2021-07-01 | End: 2021-07-04 | Stop reason: HOSPADM

## 2021-07-01 RX ORDER — ACETAMINOPHEN 325 MG/1
650 TABLET ORAL
Status: DISCONTINUED | OUTPATIENT
Start: 2021-07-01 | End: 2021-07-04 | Stop reason: HOSPADM

## 2021-07-01 RX ORDER — SODIUM CHLORIDE 0.9 % (FLUSH) 0.9 %
5-10 SYRINGE (ML) INJECTION EVERY 8 HOURS
Status: DISCONTINUED | OUTPATIENT
Start: 2021-07-01 | End: 2021-07-04 | Stop reason: HOSPADM

## 2021-07-01 RX ORDER — WARFARIN 2.5 MG/1
2.5 TABLET ORAL EVERY EVENING
Status: DISCONTINUED | OUTPATIENT
Start: 2021-07-01 | End: 2021-07-04 | Stop reason: HOSPADM

## 2021-07-01 RX ORDER — SODIUM CHLORIDE 0.9 % (FLUSH) 0.9 %
10 SYRINGE (ML) INJECTION
Status: COMPLETED | OUTPATIENT
Start: 2021-07-01 | End: 2021-07-01

## 2021-07-01 RX ORDER — ONDANSETRON 2 MG/ML
4 INJECTION INTRAMUSCULAR; INTRAVENOUS
Status: COMPLETED | OUTPATIENT
Start: 2021-07-01 | End: 2021-07-01

## 2021-07-01 RX ORDER — SODIUM CHLORIDE 9 MG/ML
50 INJECTION, SOLUTION INTRAVENOUS CONTINUOUS
Status: DISPENSED | OUTPATIENT
Start: 2021-07-01 | End: 2021-07-02

## 2021-07-01 RX ORDER — SODIUM CHLORIDE 0.9 % (FLUSH) 0.9 %
5-10 SYRINGE (ML) INJECTION AS NEEDED
Status: DISCONTINUED | OUTPATIENT
Start: 2021-07-01 | End: 2021-07-04 | Stop reason: HOSPADM

## 2021-07-01 RX ADMIN — HYDROMORPHONE HYDROCHLORIDE 0.3 MG: 1 INJECTION, SOLUTION INTRAMUSCULAR; INTRAVENOUS; SUBCUTANEOUS at 18:35

## 2021-07-01 RX ADMIN — SODIUM CHLORIDE 100 ML: 900 INJECTION, SOLUTION INTRAVENOUS at 17:42

## 2021-07-01 RX ADMIN — Medication 10 ML: at 21:43

## 2021-07-01 RX ADMIN — SODIUM CHLORIDE 50 ML/HR: 900 INJECTION, SOLUTION INTRAVENOUS at 21:43

## 2021-07-01 RX ADMIN — CEFTRIAXONE 1 G: 1 INJECTION, POWDER, FOR SOLUTION INTRAMUSCULAR; INTRAVENOUS at 17:41

## 2021-07-01 RX ADMIN — ONDANSETRON 4 MG: 2 INJECTION INTRAMUSCULAR; INTRAVENOUS at 17:41

## 2021-07-01 RX ADMIN — Medication 10 ML: at 21:44

## 2021-07-01 RX ADMIN — PIPERACILLIN SODIUM AND TAZOBACTAM SODIUM 3.38 G: 3; .375 INJECTION, POWDER, LYOPHILIZED, FOR SOLUTION INTRAVENOUS at 21:43

## 2021-07-01 RX ADMIN — SODIUM CHLORIDE 1000 ML: 900 INJECTION, SOLUTION INTRAVENOUS at 18:47

## 2021-07-01 RX ADMIN — IOPAMIDOL 100 ML: 755 INJECTION, SOLUTION INTRAVENOUS at 17:42

## 2021-07-01 RX ADMIN — SODIUM CHLORIDE 1000 ML: 900 INJECTION, SOLUTION INTRAVENOUS at 17:40

## 2021-07-01 RX ADMIN — Medication 10 ML: at 17:42

## 2021-07-01 NOTE — ED NOTES
Pt reports she currently receives infusions for psoriasis. Recently finished prednisone prescription. Hx of diverticulitis, gallbladder removal. 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 Denies blood in urine, vomit or stool. Denies diarrhea or constipation.

## 2021-07-01 NOTE — ED PROVIDER NOTES
Is here with abdominal discomfort she first noticed when she woke with it around 1:00 this morning. She has had some nausea and at times had scant amount of vomiting times either 2 or 3. Had a bowel movement that was small. She has had no blood in either her emesis or stool. 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 He has had some belching associated with this. Denies any fever. Urine is quite dark and somewhat cloudy. Aware of any fevers or chills. The history is provided by the patient. Abdominal Pain   This is a new problem. Episode onset: 0100. The problem occurs constantly. The problem has not changed since onset. The pain is located in the suprapubic region. The pain is moderate. Associated symptoms include nausea and vomiting. Pertinent negatives include no fever, no hematochezia, no melena and no dysuria. The pain is relieved by nothing. The patient's surgical history includes cholecystectomy and hysterectomy. 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       Past Medical History:   Diagnosis Date    Accelerated hypertension 8/20/2014    Aortic valve insufficiency 8/20/2014    Arrhythmia     at fib    Arthritis     Atrial fibrillation ([address] 8/20/2014    CAD (coronary artery disease)     Cancer (HCC)     basal    Congestive heart failure (CHF) ([address] 10/29/2015    Coronary atherosclerosis 10/29/2015    GERD (gastroesophageal reflux disease)     Hypertension     Hypokalemia 8/20/2014    Nausea & vomiting     Pacemaker 8/20/2014    Thyroid disease     Unstable angina ([address] 8/20/2014       Past Surgical History:   Procedure Laterality Date    ABDOMEN SURGERY PROC UNLISTED      gallbladder    CARDIAC SURG PROCEDURE UNLIST      card cath stents,at fib ablation    HX BREAST BIOPSY Left     Lt breast approx. 25 yrs ago.     HX GYN      total hysterectomy    HX OTHER SURGICAL nephrectomy  3rd kidney    HX PACEMAKER           Family History:   Problem Relation Age of Onset    Heart Disease Mother     Heart Disease Brother     Heart Disease Brother     Breast Cancer Neg Hx        Social History     Socioeconomic History    Marital status:      Spouse name: Not on file    Number of children: Not on file    Years of education: Not on file    Highest education level: Not on file   Occupational History    Not on file   Tobacco Use    Smoking status: Former Smoker     Quit date: 10/29/1985     Years since quittin.6    Smokeless tobacco: Never Used   Substance and Sexual Activity    Alcohol use: No    Drug use: Not on file    Sexual activity: Not on file   Other Topics Concern    Not on file   Social History Narrative    Not on file     Social Determinants of Health     Financial Resource Strain:     Difficulty of Paying Living Expenses:    Food Insecurity:     Worried About Running Out of Food in the Last Year:     [address] in the Last Year:    Transportation Needs:     Lack of Transportation (Medical):  Lack of Transportation (Non-Medical):    Physical Activity:     Days of Exercise per Week:     Minutes of Exercise per Session:    Stress:     Feeling of Stress :    Social Connections:     Frequency of Communication with Friends and Family:     Frequency of Social Gatherings with Friends and Family:     Attends Rastafarian Services:     Active Member of Clubs or Organizations:     Attends Club or Organization Meetings:     Marital Status:    Intimate Partner Violence:     Fear of Current or Ex-Partner:     Emotionally Abused:     Physically Abused:     Sexually Abused: ALLERGIES: Atorvastatin, Codeine, Dilaudid [hydromorphone], Morphine, Simvastatin, and Sulfa (sulfonamide antibiotics)    Review of Systems   Constitutional: Negative for fever. Gastrointestinal: Positive for abdominal pain, nausea and vomiting.  Negative for hematochezia and melena. Genitourinary: Negative for dysuria. All other systems reviewed and are negative. Vitals:    07/01/21 1534 07/01/21 1654   BP: (!) 94/55    Pulse: 75    Resp: 16    Temp: 98.8 °F (37.1 °C)    SpO2: 95% 95%   Weight: 71.7 kg (158 lb)             Physical Exam  Vitals and nursing note reviewed. Constitutional:       General: She is not in acute distress. Appearance: She is well-developed. HENT:      Head: Atraumatic. Eyes:      General: No scleral icterus. Cardiovascular:      Rate and Rhythm: Normal rate. Pulmonary:      Effort: Pulmonary effort is normal. No respiratory distress. Abdominal:      General: Bowel sounds are normal.      Palpations: Abdomen is soft. Tenderness: There is no guarding or rebound. Hernia: No hernia is present. Musculoskeletal:      Cervical back: Neck supple. Skin:     General: Skin is warm and dry. Neurological:      General: No focal deficit present. Psychiatric:         Mood and Affect: Mood normal.         Behavior: Behavior normal.         Thought Content: Thought content normal.          MDM  Number of Diagnoses or Management Options  Diagnosis management comments: Dramatic leukocytosis and findings of probable UTI and possible diverticulitis. Actually has fairly soft findings regarding each. Abdomen with no peritoneal signs. No real CVAT. Does have urologic history that may increase risk. No known blood dyscrasias. Admitting and possibly cultures will define.  Looks better than would expect by labs       Amount and/or Complexity of Data Reviewed  Clinical lab tests: ordered and reviewed  Tests in the radiology section of CPT®: reviewed and ordered  Obtain history from someone other than the patient: yes  Review and summarize past medical records: yes  Discuss the patient with other providers: yes  Independent visualization of images, tracings, or specimens: yes    Risk of Complications, Morbidity, and/or Mortality  Presenting problems: moderate  Diagnostic procedures: moderate           Procedures

## 2021-07-01 NOTE — ED TRIAGE NOTES
Patient presents via [facility] with complaints of abdominal pain. History of diverticulitis.   112/50  HR 76 97% RA BGL 98

## 2021-07-01 NOTE — H&P
[facility] History and Physical       Name:  Michael Light  Age:81 y.o. Sex:female   :  1940    MRN:  [id number]   PCP:  Ez Matthews MD      Admit Date:  2021  4:34 PM   Chief Complaint: Abdominal pain, nausea, vomiting    Reason for Admission:   Diverticulitis [K57.92]    Assessment & Plan:     Acute sigmoid diverticulitis: Initiated antibiotics, will provide IV hydration gently as patient has history of congestive heart failure. Patient has elevated lactic acid at this time, likely secondary to ongoing infection, will request surgical opinion as white count is disproportionately elevated. Chronic atrial fibrillation: Rate is controlled, will follow continue home medications including sotalol, Coumadin. Dyslipidemia: continue on statins. Hypothyroidism: Continue on levothyroxine. Disposition/Expected LOS: 4  Diet: DIET ADULT  VTE ppx: Patient is on Coumadin  GI ppx: none  Code status: Full Code  Surrogate decision-maker: self      History of Presenting Illness:     78years old female with past medical history of chronic atrial fibrillation on anticoagulation with Coumadin, history of hypertension, coronary artery disease presented to emergency room with chief complaint of nausea, vomiting, abdominal discomfort going on since this morning which continued to get worse. Patient abdominal pain is suprapubic, associated with nausea and 2 episodes of vomiting. No aggravating or relieving factors. Denies any diarrhea. Denies any chest pain, shortness of breath, cough or sputum production. Denies any fever, chills but reports feeling generalized weak. Patient  was at bedside helping with history. Evaluated in the ER, further work-up shows evidence of sigmoid diverticulitis, lab work shows evidence of elevated white count, emergency room physician requested admission of this patient for further evaluation and management.   Patient was given antibiotics in the emergency room. Review of Systems:  A 14 point review of systems was taken and pertinent positive as per HPI. Past Medical History:   Diagnosis Date    Accelerated hypertension 2014    Aortic valve insufficiency 2014    Arrhythmia     at fib    Arthritis     [facility] ([facility] 2014    CAD (coronary artery disease)     Cancer (HCC)     basal    Congest[facility] failure ([facility]F) ([facility] 10/29/2015    Coronary atherosclerosis 10/29/2015    GERD (gastroesophageal reflux disease)     Hypertension     Hypokalemia 2014    Nausea & vomiting     Pacemaker 2014    Thyroid disease     Unstable angina ([facility] 2014       Past Surgical History:   Procedure Laterality Date    ABDOMEN SURGERY PROC UNLISTED      gallbladder    CARDIAC SURG PROCEDURE UNLIST      card cath stents,at fib ablation    HX BREAST BIOPSY Left     Lt breast approx. 25 yrs ago.     HX GYN      total hysterectomy    HX OTHER SURGICAL      nephrectomy  3rd kidney    HX PACEMAKER         Family History : reviewed  Family History   Problem Relation Age of Onset    Heart Disease Mother     Heart Disease Brother     Heart Disease Brother     Breast Cancer Neg Hx         Social History     Tobacco Use    Smoking status: Former Smoker     Quit date: 10/29/1985     Years since quittin.6    Smokeless tobacco: Never Used   Substance Use Topics    Alcohol use: No       Allergies   Allergen Reactions    Atorvastatin Myalgia    Codeine Nausea Only    Dilaudid [Hydromorphone] Unknown (comments)    Morphine Unknown (comments)    Simvastatin Myalgia    Sulfa (Sulfonamide Antibiotics) Swelling       Immunization History   Administered Date(s) Administered    Covid-19, MODERNA, Mrna, Lnp-s, Pf, 100mcg/0.5mL 2021, 2021         PTA Medications:  Current Outpatient Medications   Medication Instructions    acetaminophen (TYLENOL EXTRA STRENGTH) 500 mg tablet Oral, EVERY 6 HOURS AS NEEDED    Cholecalciferol, Vitamin D3, (VITAMIN D) 2,000 unit Cap 2,000 Int'l Units, DAILY    clopidogreL (PLAVIX) 75 mg, Oral, DAILY    ezetimibe (ZETIA) 10 mg, Oral, DAILY    famotidine (PEPCID) 20 mg, Oral, EVERY BEDTIME    fexofenadine (ALLEGRA) 180 mg, Oral, DAILY AS NEEDED    fluticasone (VERAMYST) 27.5 mcg/actuation nasal spray 2 Sprays, Nasal, DAILY, As needed     folic acid (FOLVITE) 1 mg, DAILY    furosemide (LASIX) 40 mg tablet As needed    halobetasol (ULTRAVATE) 0.05 % topical cream Topical, 2 TIMES DAILY, use thin layer     levothyroxine (SYNTHROID) 175 mcg, Oral, DAILY    losartan-hydroCHLOROthiazide (HYZAAR) 50-12.5 mg per tablet 1 Tablet, Oral, DAILY    methotrexate (RHEUMATREX) 2.5 mg, Oral, DAILY, On Tues and Wed    nitroglycerin (NITROSTAT) 0.4 mg, Oral, EVERY 5 MIN AS NEEDED    omeprazole (PRILOSEC) 40 mg, DAILY    pitavastatin (LIVALO) 4 mg tab tablet Oral, DAILY    potassium chloride SA (K-DUR, KLOR-CON M20) 20 mEq tablet 20 mEq, Oral, DAILY    Premarin 0.625 mg, DAILY    sotaloL (BETAPACE) 120 mg, Oral, 2 TIMES DAILY    traMADoL (ULTRAM) 50 mg, Oral, EVERY 6 HOURS AS NEEDED    warfarin (Coumadin) 5 mg tablet 1/2-1 tab po daily or as directed. Objective:     Patient Vitals for the past 24 hrs:   Temp Pulse Resp BP SpO2   07/01/21 1830  76  131/61 97 %   07/01/21 1800  75  137/65 98 %   07/01/21 1657  75  (!) 115/59 95 %   07/01/21 1654     95 %   07/01/21 1534 98.8 °F (37.1 °C) 75 16 (!) 94/55 95 %       Oxygen Therapy  O2 Sat (%): 97 % (07/01/21 1830)  Pulse via Oximetry: 76 beats per minute (07/01/21 1830)  O2 Device: None (Room air) (07/01/21 1654)    Body mass index is 25.5 kg/m².     Physical Exam:    General:  No acute distress, speaking in full sentences  HEENT:  Pupils equal and reactive to light and accommodation, oropharynx is clear   Neck:   Supple, no lymphadenopathy, no JVD   Lungs:  Clear to auscultation bilaterally   CV:   Regular rate and rhythm with normal S1 and S2   Abdomen:  Soft, tender over left lower quadrant, nondistended, normoactive bowel sounds   Extremities:  No cyanosis clubbing or edema   Neuro:  Nonfocal, A&O x3   Psych:  Normal mood and affect       Data Reviewed: I have reviewed all labs, meds, and studies. Recent Results (from the past 24 hour(s))   CBC WITH AUTOMATED DIFF    Collection Time: 07/01/21  3:37 PM   Result Value Ref Range    WBC 42.0 (H) 4.3 - 11.1 K/uL    RBC 4.33 4.05 - 5.2 M/uL    HGB 12.4 11.7 - 15.4 g/dL    HCT 39.1 35.8 - 46.3 %    MCV 90.3 79.6 - 97.8 FL    MCH 28.6 26.1 - 32.9 PG    MCHC 31.7 31.4 - 35.0 g/dL    RDW 16.3 (H) 11.9 - 14.6 %    PLATELET 731 563 - 991 K/uL    MPV 10.4 9.4 - 12.3 FL    ABSOLUTE NRBC 0.03 0.0 - 0.2 K/uL    NEUTROPHILS 83 (H) 43 - 78 %    LYMPHOCYTES 4 (L) 13 - 44 %    MONOCYTES 7 4.0 - 12.0 %    EOSINOPHILS 0 (L) 0.5 - 7.8 %    BASOPHILS 0 0.0 - 2.0 %    IMMATURE GRANULOCYTES 6 (H) 0.0 - 5.0 %    ABS. NEUTROPHILS 34.9 (H) 1.7 - 8.2 K/UL    ABS. LYMPHOCYTES 1.7 0.5 - 4.6 K/UL    ABS. MONOCYTES 2.9 (H) 0.1 - 1.3 K/UL    ABS. EOSINOPHILS 0.0 0.0 - 0.8 K/UL    ABS. BASOPHILS 0.0 0.0 - 0.2 K/UL    ABS. IMM. GRANS.  2.5 (H) 0.0 - 0.5 K/UL    RBC COMMENTS SLIGHT  ANISOCYTOSIS + POIKILOCYTOSIS        RBC COMMENTS OCCASIONAL  POIKILOCYTOSIS        WBC COMMENTS Result Confirmed By Smear      PLATELET COMMENTS ADEQUATE      DF AUTOMATED     METABOLIC PANEL, COMPREHENSIVE    Collection Time: 07/01/21  3:37 PM   Result Value Ref Range    Sodium 137 136 - 145 mmol/L    Potassium 3.8 3.5 - 5.1 mmol/L    Chloride 102 98 - 107 mmol/L    CO2 29 21 - 32 mmol/L    Anion gap 6 (L) 7 - 16 mmol/L    Glucose 143 (H) 65 - 100 mg/dL    BUN 31 (H) 8 - 23 MG/DL    Creatinine 1.39 (H) 0.6 - 1.0 MG/DL    GFR est AA 47 (L) >60 ml/min/1.73m2    GFR est non-AA 38 (L) >60 ml/min/1.73m2    Calcium 8.5 8.3 - 10.4 MG/DL    Bilirubin, total 0.8 0.2 - 1.1 MG/DL    ALT (SGPT) 39 12 - 65 U/L    AST (SGOT) 39 (H) 15 - 37 U/L    Alk. phosphatase 88 50 - 136 U/L    Protein, total 6.3 6.3 - 8.2 g/dL    Albumin 2.8 (L) 3.2 - 4.6 g/dL    Globulin 3.5 2.3 - 3.5 g/dL    A-G Ratio 0.8 (L) 1.2 - 3.5     LIPASE    Collection Time: 07/01/21  3:37 PM   Result Value Ref Range    Lipase 65 (L) 73 - 393 U/L   URINE MICROSCOPIC    Collection Time: 07/01/21  5:05 PM   Result Value Ref Range    WBC >100 0 /hpf    RBC 3-5 0 /hpf    Epithelial cells 3-5 0 /hpf    Bacteria 1+ (H) 0 /hpf    Casts HYALINE 0 /lpf    Crystals, urine 0 0 /LPF    Mucus 1+ (H) 0 /lpf    Other observations RESULTS VERIFIED MANUALLY     PROTHROMBIN TIME + INR    Collection Time: 07/01/21  5:34 PM   Result Value Ref Range    Prothrombin time 24.9 (H) 12.5 - 14.7 sec    INR 2.2     LACTIC ACID    Collection Time: 07/01/21  5:34 PM   Result Value Ref Range    Lactic acid 3.2 (H) 0.4 - 2.0 MMOL/L       EKG Results     None          All Micro Results     None          Other Studies:  CT ABD PELV W CONT    Result Date: 7/1/2021  CT ABDOMEN AND PELVIS WITH CONTRAST HISTORY: Lower abdominal pain COMPARISON: None TECHNIQUE: Helical imaging was performed from the lung bases through the ischial tuberosities during the intravenous infusion of 100 cc of Isovue-370. Oral contrast was not administered. Coronal and sagittal reformats were performed. Dose reduction techniques used: Automated exposure control, adjustment of the mAs and/or kVp according to patient's size, and iterative reconstruction techniques. FINDINGS: *  LUNG BASES: Pacemaker. Mural calcifications of the left atrial *  LIVER: Within normal limits. *  GALLBLADDER AND BILE DUCTS: Cholecystectomy. *  SPLEEN: Within normal limits. *  URINARY TRACT: Status post resection of a duplicate right kidney. *  ADRENALS: Within normal limits. *  PANCREAS: Within normal limits. *  GASTROINTESTINAL TRACT: Sigmoid diverticulosis without evidence of diverticulitis. *  RETROPERITONEUM: Within normal limits.  *  PERITONEAL CAVITY AND ABDOMINAL WALL: Within normal limits. *  PELVIS: Within normal limits. *  SPINE / BONES: Within normal limits. *  OTHER COMMENTS: None. Sigmoid diverticulitis. Mural calcifications of the left atrium of uncertain etiology. She has had a prior ablation Cholecystectomy. Status post resection of a duplicated right kidney.  Date of Dictation: 7/1/2021 5:55 PM         Medications:  Medications Administered      Medications Administered     cefTRIAXone (ROCEPHIN) 1 g in 0.9% sodium chloride (MBP/ADV) 50 mL MBP     Admin Date  07/01/2021 Action  New Bag Dose  1 g Rate  100 mL/hr Route  IntraVENous Administered By  Agustín Connor RN          HYDROmorphone (DILAUDID) injection 0.3 mg     Admin Date  07/01/2021 Action  Given Dose  0.3 mg Route  IntraVENous Administered By  Agustín Connor RN          iopamidoL (ISOVUE-370) 76 % injection 100 mL     Admin Date  07/01/2021 Action  Given Dose  100 mL Route  IntraVENous Administered By  Tony BRADSHAW          ondansetron [facility]) injection 4 mg     Admin Date  07/01/2021 Action  Given Dose  4 mg Route  IntraVENous Administered By  Agustín Connor RN          saline peripheral flush soln 10 mL     Admin Date  07/01/2021 Action  Given Dose  10 mL Route  InterCATHeter Administered By  Tony BRADSHAW          sodium chloride 0.9 % bolus infusion 1,000 mL     Admin Date  07/01/2021 Action  New Bag Dose  1,000 mL Rate  500 mL/hr Route  IntraVENous Administered By  Agustín Connor RN           Admin Date  07/01/2021 Action  New Bag Dose  1,000 mL Rate  500 mL/hr Route  IntraVENous Administered By  Agustín Connor RN          sodium chloride 0.9 % bolus infusion 100 mL     Admin Date  07/01/2021 Action  New Bag Dose  100 mL Rate   Route  IntraVENous Administered By  Santiago Gilliland                      Problem List:     Hospital Problems as of 7/1/2021 Date Reviewed: 2/25/2021        Codes Class Noted - Resolved POA    * (Principal) Diverticulitis ICD-10-CM: K57.92  ICD-9-CM: 562.11  7/1/2021 - Present Unknown        Dyslipidemia (Chronic) ICD-10-CM: E78.5  ICD-9-CM: 272.4  8/28/2019 - Present Yes        Congestive heart failure (CHF) ([facility] ICD-10-CM: I50.9  ICD-9-CM: 428.0  10/29/2015 - Present Yes        Paroxysmal atrial fibrillation ([facility] ICD-10-CM: I48.0  ICD-9-CM: 427.31  8/20/2014 - Present Yes                 Signed By: Tha Maldonado MD   [employer] [facility]    July 1, 2021  4:22 PM

## 2021-07-02 LAB
ALBUMIN SERPL-MCNC: 2.5 G/DL (ref 3.2–4.6)
ALBUMIN/GLOB SERPL: 0.8 {RATIO} (ref 1.2–3.5)
ALP SERPL-CCNC: 78 U/L (ref 50–136)
ALT SERPL-CCNC: 27 U/L (ref 12–65)
ANION GAP SERPL CALC-SCNC: 5 MMOL/L (ref 7–16)
AST SERPL-CCNC: 22 U/L (ref 15–37)
ATRIAL RATE: 70 BPM
BASOPHILS # BLD: 0.1 K/UL (ref 0–0.2)
BASOPHILS NFR BLD: 0 % (ref 0–2)
BILIRUB SERPL-MCNC: 0.7 MG/DL (ref 0.2–1.1)
BUN SERPL-MCNC: 27 MG/DL (ref 8–23)
CALCIUM SERPL-MCNC: 8.1 MG/DL (ref 8.3–10.4)
CALCULATED P AXIS, ECG09: 50 DEGREES
CALCULATED R AXIS, ECG10: -8 DEGREES
CALCULATED T AXIS, ECG11: 118 DEGREES
CHLORIDE SERPL-SCNC: 109 MMOL/L (ref 98–107)
CO2 SERPL-SCNC: 29 MMOL/L (ref 21–32)
CREAT SERPL-MCNC: 0.86 MG/DL (ref 0.6–1)
DIAGNOSIS, 93000: NORMAL
DIFFERENTIAL METHOD BLD: ABNORMAL
EOSINOPHIL # BLD: 0.2 K/UL (ref 0–0.8)
EOSINOPHIL NFR BLD: 1 % (ref 0.5–7.8)
ERYTHROCYTE [DISTWIDTH] IN BLOOD BY AUTOMATED COUNT: 16.8 % (ref 11.9–14.6)
GLOBULIN SER CALC-MCNC: 3.2 G/DL (ref 2.3–3.5)
GLUCOSE BLD STRIP.AUTO-MCNC: 106 MG/DL (ref 65–100)
GLUCOSE SERPL-MCNC: 103 MG/DL (ref 65–100)
HCT VFR BLD AUTO: 34.7 % (ref 35.8–46.3)
HGB BLD-MCNC: 11 G/DL (ref 11.7–15.4)
IMM GRANULOCYTES # BLD AUTO: 1.6 K/UL (ref 0–0.5)
IMM GRANULOCYTES NFR BLD AUTO: 5 % (ref 0–5)
INR PPP: 2.3
LACTATE SERPL-SCNC: 1.5 MMOL/L (ref 0.4–2)
LYMPHOCYTES # BLD: 1.8 K/UL (ref 0.5–4.6)
LYMPHOCYTES NFR BLD: 6 % (ref 13–44)
MAGNESIUM SERPL-MCNC: 2.1 MG/DL (ref 1.8–2.4)
MCH RBC QN AUTO: 28.3 PG (ref 26.1–32.9)
MCHC RBC AUTO-ENTMCNC: 31.7 G/DL (ref 31.4–35)
MCV RBC AUTO: 89.2 FL (ref 79.6–97.8)
MONOCYTES # BLD: 2.9 K/UL (ref 0.1–1.3)
MONOCYTES NFR BLD: 9 % (ref 4–12)
NEUTS SEG # BLD: 24.6 K/UL (ref 1.7–8.2)
NEUTS SEG NFR BLD: 79 % (ref 43–78)
NRBC # BLD: 0 K/UL (ref 0–0.2)
P-R INTERVAL, ECG05: 174 MS
PLATELET # BLD AUTO: 172 K/UL (ref 150–450)
PMV BLD AUTO: 10.5 FL (ref 9.4–12.3)
POTASSIUM SERPL-SCNC: 3.5 MMOL/L (ref 3.5–5.1)
PROT SERPL-MCNC: 5.7 G/DL (ref 6.3–8.2)
PROTHROMBIN TIME: 25.2 SEC (ref 12.5–14.7)
Q-T INTERVAL, ECG07: 464 MS
QRS DURATION, ECG06: 154 MS
QTC CALCULATION (BEZET), ECG08: 501 MS
RBC # BLD AUTO: 3.89 M/UL (ref 4.05–5.2)
SERVICE CMNT-IMP: ABNORMAL
SODIUM SERPL-SCNC: 143 MMOL/L (ref 136–145)
VENTRICULAR RATE, ECG03: 70 BPM
WBC # BLD AUTO: 31.2 K/UL (ref 4.3–11.1)

## 2021-07-02 PROCEDURE — 99222 1ST HOSP IP/OBS MODERATE 55: CPT | Performed by: SURGERY

## 2021-07-02 PROCEDURE — 83605 ASSAY OF LACTIC ACID: CPT

## 2021-07-02 PROCEDURE — 85610 PROTHROMBIN TIME: CPT

## 2021-07-02 PROCEDURE — 82962 GLUCOSE BLOOD TEST: CPT

## 2021-07-02 PROCEDURE — 74011250636 HC RX REV CODE- 250/636: Performed by: FAMILY MEDICINE

## 2021-07-02 PROCEDURE — 85025 COMPLETE CBC W/AUTO DIFF WBC: CPT

## 2021-07-02 PROCEDURE — 65270000029 HC RM PRIVATE

## 2021-07-02 PROCEDURE — 36415 COLL VENOUS BLD VENIPUNCTURE: CPT

## 2021-07-02 PROCEDURE — 74011250637 HC RX REV CODE- 250/637: Performed by: INTERNAL MEDICINE

## 2021-07-02 PROCEDURE — 74011250637 HC RX REV CODE- 250/637: Performed by: HOSPITALIST

## 2021-07-02 PROCEDURE — 74011250637 HC RX REV CODE- 250/637: Performed by: FAMILY MEDICINE

## 2021-07-02 PROCEDURE — 74011000258 HC RX REV CODE- 258: Performed by: HOSPITALIST

## 2021-07-02 PROCEDURE — 80053 COMPREHEN METABOLIC PANEL: CPT

## 2021-07-02 PROCEDURE — 74011250636 HC RX REV CODE- 250/636: Performed by: HOSPITALIST

## 2021-07-02 PROCEDURE — 83735 ASSAY OF MAGNESIUM: CPT

## 2021-07-02 RX ORDER — SOTALOL HYDROCHLORIDE 80 MG/1
120 TABLET ORAL EVERY 12 HOURS
Status: DISCONTINUED | OUTPATIENT
Start: 2021-07-02 | End: 2021-07-04 | Stop reason: HOSPADM

## 2021-07-02 RX ADMIN — Medication 5 ML: at 22:42

## 2021-07-02 RX ADMIN — PIPERACILLIN SODIUM AND TAZOBACTAM SODIUM 3.38 G: 3; .375 INJECTION, POWDER, LYOPHILIZED, FOR SOLUTION INTRAVENOUS at 13:55

## 2021-07-02 RX ADMIN — PIPERACILLIN SODIUM AND TAZOBACTAM SODIUM 3.38 G: 3; .375 INJECTION, POWDER, LYOPHILIZED, FOR SOLUTION INTRAVENOUS at 20:47

## 2021-07-02 RX ADMIN — Medication 10 ML: at 05:48

## 2021-07-02 RX ADMIN — PIPERACILLIN SODIUM AND TAZOBACTAM SODIUM 3.38 G: 3; .375 INJECTION, POWDER, LYOPHILIZED, FOR SOLUTION INTRAVENOUS at 05:47

## 2021-07-02 RX ADMIN — Medication 10 ML: at 00:10

## 2021-07-02 RX ADMIN — WARFARIN SODIUM 2.5 MG: 2.5 TABLET ORAL at 00:09

## 2021-07-02 RX ADMIN — WARFARIN SODIUM 2.5 MG: 2.5 TABLET ORAL at 17:22

## 2021-07-02 RX ADMIN — LEVOTHYROXINE SODIUM 175 MCG: 0.12 TABLET ORAL at 05:48

## 2021-07-02 RX ADMIN — ONDANSETRON 4 MG: 2 INJECTION INTRAMUSCULAR; INTRAVENOUS at 20:46

## 2021-07-02 RX ADMIN — SOTALOL HYDROCHLORIDE 120 MG: 80 TABLET ORAL at 20:47

## 2021-07-02 RX ADMIN — CLOPIDOGREL BISULFATE 75 MG: 75 TABLET ORAL at 22:40

## 2021-07-02 RX ADMIN — CLOPIDOGREL BISULFATE 75 MG: 75 TABLET ORAL at 00:09

## 2021-07-02 RX ADMIN — Medication 5 ML: at 13:57

## 2021-07-02 RX ADMIN — SOTALOL HYDROCHLORIDE 120 MG: 80 TABLET ORAL at 00:09

## 2021-07-02 RX ADMIN — HYDROMORPHONE HYDROCHLORIDE 0.2 MG: 1 INJECTION, SOLUTION INTRAMUSCULAR; INTRAVENOUS; SUBCUTANEOUS at 20:46

## 2021-07-02 NOTE — PROGRESS NOTES
[facility]ist Progress Note       Name:  Kojo Pedro  Age:81 y.o. Sex:female   :  1940    MRN:  [id number]   PCP:  Marcos Dawkins MD      Admit Date:  2021  4:34 PM   Chief Complaint: Abdominal pain, nausea, vomiting    Reason for Admission:   Diverticulitis [K57.92]    Assessment & Plan:     Acute sigmoid diverticulitis: continue antibiotics, continue IV hydration gently as patient has history of congestive heart failure. Repeat lactic acid  Surgery consult appreciated. She is not a candidate for any intervention. Chronic atrial fibrillation: Rate is controlled, will follow continue home medications including sotalol- change sotalol to bid like she takes at home, Continue Coumadin. Dyslipidemia: continue on statins. Hypothyroidism: Continue on levothyroxine. Suspected sepsis- I suspect pt had sirs &/or sepsis on admission given her lactic acid of 3.2, wbc > 40 and underlying bowel infection. As she is on sotalol this would have impacted her ability to increase her hr appropriately. Disposition/Expected LOS: 2-3 days depending on clinical course  Diet: DIET ADULT  VTE ppx: Patient is on Coumadin  GI ppx: none  Code status: Full Code  Surrogate decision-maker: self      History of Presenting Illness:     78years old female with past medical history of chronic atrial fibrillation on anticoagulation with Coumadin, history of hypertension, coronary artery disease presented to emergency room with chief complaint of nausea, vomiting, abdominal discomfort going on since this morning which continued to get worse. Patient abdominal pain is suprapubic, associated with nausea and 2 episodes of vomiting. No aggravating or relieving factors. Denies any diarrhea. Denies any chest pain, shortness of breath, cough or sputum production. Denies any fever, chills but reports feeling generalized weak. Patient  was at bedside helping with history.   Evaluated in the ER, further work-up shows evidence of sigmoid diverticulitis, lab work shows evidence of elevated white count, emergency room physician requested admission of this patient for further evaluation and management. Patient was given antibiotics in the emergency room. 2021- pt seen - still with left upper quadrant abd pain. No rebound. Feels very and rundown. Afebrile over night. No further episodes of n,v or diarrhea. Review of Systems:  A 14 point review of systems was taken and pertinent positive as per HPI. Past Medical History:   Diagnosis Date    Accelerated hypertension 2014    Aortic valve insufficiency 2014    Arrhythmia     at fib    Arthritis     Atrial fibrillation ([facility] 2014    CAD (coronary artery disease)     Cancer (HCC)     basal    Congestive heart failure (CHF) ([facility] 10/29/2015    Coronary atherosclerosis 10/29/2015    GERD (gastroesophageal reflux disease)     Hypertension     Hypokalemia 2014    Nausea & vomiting     Pacemaker 2014    Thyroid disease     Unstable angina ([facility] [facility] 2014       Past Surgical History:   Procedure Laterality Date    ABDOMEN SURGERY PROC UNLISTED      gallbladder    CARDIAC SURG PROCEDURE UNLIST      card cath stents,at fib ablation    HX BREAST BIOPSY Left     Lt breast approx. 25 yrs ago.     HX GYN      total hysterectomy    HX OTHER SURGICAL      nephrectomy  3rd kidney    HX PACEMAKER         Family History : reviewed  Family History   Problem Relation Age of Onset    Heart Disease Mother     Heart Disease Brother     Heart Disease Brother     Breast Cancer Neg Hx         Social History     Tobacco Use    Smoking status: Former Smoker     Quit date: 10/29/1985     Years since quittin.6    Smokeless tobacco: Never Used   Substance Use Topics    Alcohol use: No       Allergies   Allergen Reactions    Atorvastatin Myalgia    Codeine Nausea Only    Dilaudid [Hydromorphone] Unknown (comments)    Morphine Unknown (comments)    Simvastatin Myalgia    Sulfa (Sulfonamide Antibiotics) Swelling       Immunization History   Administered Date(s) Administered    Covid-19, MODERNA, Mrna, Lnp-s, Pf, 100mcg/0.5mL 01/21/2021, 02/20/2021         PTA Medications:  Current Outpatient Medications   Medication Instructions    acetaminophen (TYLENOL EXTRA STRENGTH) 500 mg tablet Oral, EVERY 6 HOURS AS NEEDED    Cholecalciferol, Vitamin D3, (VITAMIN D) 2,000 unit Cap 2,000 Int'l Units, DAILY    clopidogreL (PLAVIX) 75 mg, Oral, DAILY    ezetimibe (ZETIA) 10 mg, Oral, DAILY    famotidine (PEPCID) 20 mg, Oral, EVERY BEDTIME    fexofenadine (ALLEGRA) 180 mg, Oral, DAILY AS NEEDED    fluticasone (VERAMYST) 27.5 mcg/actuation nasal spray 2 Sprays, Nasal, DAILY, As needed     folic acid (FOLVITE) 1 mg, DAILY    furosemide (LASIX) 40 mg tablet As needed    halobetasol (ULTRAVATE) 0.05 % topical cream Topical, 2 TIMES DAILY, use thin layer     levothyroxine (SYNTHROID) 175 mcg, Oral, DAILY    losartan-hydroCHLOROthiazide (HYZAAR) 50-12.5 mg per tablet 1 Tablet, Oral, DAILY    methotrexate (RHEUMATREX) 2.5 mg, DAILY    nitroglycerin (NITROSTAT) 0.4 mg, Oral, EVERY 5 MIN AS NEEDED    omeprazole (PRILOSEC) 40 mg, DAILY    pitavastatin (LIVALO) 4 mg tab tablet Oral, DAILY    potassium chloride SA (K-DUR, KLOR-CON M20) 20 mEq tablet 20 mEq, DAILY    Premarin 0.625 mg, DAILY    sotaloL (BETAPACE) 120 mg, Oral, 2 TIMES DAILY    traMADoL (ULTRAM) 50 mg, EVERY 6 HOURS AS NEEDED    warfarin (Coumadin) 5 mg tablet 1/2-1 tab po daily or as directed.        Objective:     Patient Vitals for the past 24 hrs:   Temp Pulse Resp BP SpO2   07/02/21 1200 98.1 °F (36.7 °C) 72 17 106/61 93 %   07/02/21 0800 98.2 °F (36.8 °C) 74 16 114/66 95 %   07/02/21 0333 97.7 °F (36.5 °C) 70 16 102/63 96 %   07/02/21 0002 97.7 °F (36.5 °C) 70 16 106/61 95 %   07/01/21 2201 97.8 °F (36.6 °C) 77 16 122/62 95 %   07/01/21 2001  75  (!) 143/68 96 %   07/01/21 1830  76  131/61 97 %   07/01/21 1800  75  137/65 98 %   07/01/21 1657  75  (!) 115/59 95 %   07/01/21 1654     95 %   07/01/21 1534 98.8 °F (37.1 °C) 75 16 (!) 94/55 95 %       Oxygen Therapy  O2 Sat (%): 93 % (07/02/21 1200)  Pulse via Oximetry: 75 beats per minute (07/01/21 2001)  O2 Device: None (Room air) (07/01/21 1654)    Body mass index is 26.7 kg/m². Physical Exam:    General:  No acute distress, appears ill looking, speaking in full sentences  HEENT:  Pupils equal and reactive to light and accommodation, oropharynx is clear   Neck:   Supple, no lymphadenopathy, no JVD   Lungs:  Clear to auscultation bilaterally   CV:   Regular rate and rhythm with normal S1 and S2   Abdomen:  Soft, tender over left upper quadrant, nondistended, hyperactiveactive bowel sounds   Extremities:  No cyanosis clubbing or edema   Neuro:  Nonfocal, A&O x3   Psych:  Normal mood and affect       Data Reviewed: I have reviewed all labs, meds, and studies. Recent Results (from the past 24 hour(s))   CBC WITH AUTOMATED DIFF    Collection Time: 07/01/21  3:37 PM   Result Value Ref Range    WBC 42.0 (H) 4.3 - 11.1 K/uL    RBC 4.33 4.05 - 5.2 M/uL    HGB 12.4 11.7 - 15.4 g/dL    HCT 39.1 35.8 - 46.3 %    MCV 90.3 79.6 - 97.8 FL    MCH 28.6 26.1 - 32.9 PG    MCHC 31.7 31.4 - 35.0 g/dL    RDW 16.3 (H) 11.9 - 14.6 %    PLATELET 370 432 - 031 K/uL    MPV 10.4 9.4 - 12.3 FL    ABSOLUTE NRBC 0.03 0.0 - 0.2 K/uL    NEUTROPHILS 83 (H) 43 - 78 %    LYMPHOCYTES 4 (L) 13 - 44 %    MONOCYTES 7 4.0 - 12.0 %    EOSINOPHILS 0 (L) 0.5 - 7.8 %    BASOPHILS 0 0.0 - 2.0 %    IMMATURE GRANULOCYTES 6 (H) 0.0 - 5.0 %    ABS. NEUTROPHILS 34.9 (H) 1.7 - 8.2 K/UL    ABS. LYMPHOCYTES 1.7 0.5 - 4.6 K/UL    ABS. MONOCYTES 2.9 (H) 0.1 - 1.3 K/UL    ABS. EOSINOPHILS 0.0 0.0 - 0.8 K/UL    ABS. BASOPHILS 0.0 0.0 - 0.2 K/UL    ABS. IMM. GRANS.  2.5 (H) 0.0 - 0.5 K/UL    RBC COMMENTS SLIGHT  ANISOCYTOSIS + POIKILOCYTOSIS RBC COMMENTS OCCASIONAL  POIKILOCYTOSIS        WBC COMMENTS Result Confirmed By Smear      PLATELET COMMENTS ADEQUATE      DF AUTOMATED     METABOLIC PANEL, COMPREHENSIVE    Collection Time: 07/01/21  3:37 PM   Result Value Ref Range    Sodium 137 136 - 145 mmol/L    Potassium 3.8 3.5 - 5.1 mmol/L    Chloride 102 98 - 107 mmol/L    CO2 29 21 - 32 mmol/L    Anion gap 6 (L) 7 - 16 mmol/L    Glucose 143 (H) 65 - 100 mg/dL    BUN 31 (H) 8 - 23 MG/DL    Creatinine 1.39 (H) 0.6 - 1.0 MG/DL    GFR est AA 47 (L) >60 ml/min/1.73m2    GFR est non-AA 38 (L) >60 ml/min/1.73m2    Calcium 8.5 8.3 - 10.4 MG/DL    Bilirubin, total 0.8 0.2 - 1.1 MG/DL    ALT (SGPT) 39 12 - 65 U/L    AST (SGOT) 39 (H) 15 - 37 U/L    Alk.  phosphatase 88 50 - 136 U/L    Protein, total 6.3 6.3 - 8.2 g/dL    Albumin 2.8 (L) 3.2 - 4.6 g/dL    Globulin 3.5 2.3 - 3.5 g/dL    A-G Ratio 0.8 (L) 1.2 - 3.5     LIPASE    Collection Time: 07/01/21  3:37 PM   Result Value Ref Range    Lipase 65 (L) 73 - 393 U/L   URINE MICROSCOPIC    Collection Time: 07/01/21  5:05 PM   Result Value Ref Range    WBC >100 0 /hpf    RBC 3-5 0 /hpf    Epithelial cells 3-5 0 /hpf    Bacteria 1+ (H) 0 /hpf    Casts HYALINE 0 /lpf    Crystals, urine 0 0 /LPF    Mucus 1+ (H) 0 /lpf    Other observations RESULTS VERIFIED MANUALLY     PROTHROMBIN TIME + INR    Collection Time: 07/01/21  5:34 PM   Result Value Ref Range    Prothrombin time 24.9 (H) 12.5 - 14.7 sec    INR 2.2     LACTIC ACID    Collection Time: 07/01/21  5:34 PM   Result Value Ref Range    Lactic acid 3.2 (H) 0.4 - 2.0 MMOL/L   CULTURE, BLOOD    Collection Time: 07/01/21  7:49 PM    Specimen: Blood   Result Value Ref Range    Special Requests: LEFT  Antecubital        Culture result: NO GROWTH AFTER 8 HOURS     LACTIC ACID    Collection Time: 07/01/21  7:50 PM   Result Value Ref Range    Lactic acid 3.1 (H) 0.4 - 2.0 MMOL/L   CULTURE, BLOOD    Collection Time: 07/01/21 10:29 PM    Specimen: Blood   Result Value Ref Range Special Requests: RIGHT  HAND        Culture result: NO GROWTH AFTER 8 HOURS     EKG, 12 LEAD, INITIAL    Collection Time: 07/01/21 11:19 PM   Result Value Ref Range    Ventricular Rate 70 BPM    Atrial Rate 70 BPM    P-R Interval 174 ms    QRS Duration 154 ms    Q-T Interval 464 ms    QTC Calculation (Bezet) 501 ms    Calculated P Axis 50 degrees    Calculated R Axis -8 degrees    Calculated T Axis 118 degrees    Diagnosis       AV dual-paced rhythm  Abnormal ECG  When compared with ECG of 28-AUG-2019 09:42,  No significant change was found  Confirmed by Gevena Mast ([id number]) on 7/2/2021 8:45:48 AM     METABOLIC PANEL, COMPREHENSIVE    Collection Time: 07/02/21  5:14 AM   Result Value Ref Range    Sodium 143 136 - 145 mmol/L    Potassium 3.5 3.5 - 5.1 mmol/L    Chloride 109 (H) 98 - 107 mmol/L    CO2 29 21 - 32 mmol/L    Anion gap 5 (L) 7 - 16 mmol/L    Glucose 103 (H) 65 - 100 mg/dL    BUN 27 (H) 8 - 23 MG/DL    Creatinine 0.86 0.6 - 1.0 MG/DL    GFR est AA >60 >60 ml/min/1.73m2    GFR est non-AA >60 >60 ml/min/1.73m2    Calcium 8.1 (L) 8.3 - 10.4 MG/DL    Bilirubin, total 0.7 0.2 - 1.1 MG/DL    ALT (SGPT) 27 12 - 65 U/L    AST (SGOT) 22 15 - 37 U/L    Alk.  phosphatase 78 50 - 136 U/L    Protein, total 5.7 (L) 6.3 - 8.2 g/dL    Albumin 2.5 (L) 3.2 - 4.6 g/dL    Globulin 3.2 2.3 - 3.5 g/dL    A-G Ratio 0.8 (L) 1.2 - 3.5     MAGNESIUM    Collection Time: 07/02/21  5:14 AM   Result Value Ref Range    Magnesium 2.1 1.8 - 2.4 mg/dL   CBC WITH AUTOMATED DIFF    Collection Time: 07/02/21  5:14 AM   Result Value Ref Range    WBC 31.2 (H) 4.3 - 11.1 K/uL    RBC 3.89 (L) 4.05 - 5.2 M/uL    HGB 11.0 (L) 11.7 - 15.4 g/dL    HCT 34.7 (L) 35.8 - 46.3 %    MCV 89.2 79.6 - 97.8 FL    MCH 28.3 26.1 - 32.9 PG    MCHC 31.7 31.4 - 35.0 g/dL    RDW 16.8 (H) 11.9 - 14.6 %    PLATELET 990 701 - 755 K/uL    MPV 10.5 9.4 - 12.3 FL    ABSOLUTE NRBC 0.00 0.0 - 0.2 K/uL    DF AUTOMATED      NEUTROPHILS 79 (H) 43 - 78 %    LYMPHOCYTES 6 (L) 13 - 44 %    MONOCYTES 9 4.0 - 12.0 %    EOSINOPHILS 1 0.5 - 7.8 %    BASOPHILS 0 0.0 - 2.0 %    IMMATURE GRANULOCYTES 5 0.0 - 5.0 %    ABS. NEUTROPHILS 24.6 (H) 1.7 - 8.2 K/UL    ABS. LYMPHOCYTES 1.8 0.5 - 4.6 K/UL    ABS. MONOCYTES 2.9 (H) 0.1 - 1.3 K/UL    ABS. EOSINOPHILS 0.2 0.0 - 0.8 K/UL    ABS. BASOPHILS 0.1 0.0 - 0.2 K/UL    ABS. IMM. GRANS.  1.6 (H) 0.0 - 0.5 K/UL   PROTHROMBIN TIME + INR    Collection Time: 07/02/21  5:14 AM   Result Value Ref Range    Prothrombin time 25.2 (H) 12.5 - 14.7 sec    INR 2.3         EKG Results     Procedure 720 Value Units Date/Time    EKG, 12 LEAD, INITIAL [[id number]] Collected: 07/01/21 2319    Order Status: Completed Updated: 07/02/21 0659     Ventricular Rate 70 BPM      Atrial Rate 70 BPM      P-R Interval 174 ms      QRS Duration 154 ms      Q-T Interval 464 ms      QTC Calculation (Bezet) 501 ms      Calculated P Axis 50 degrees      Calculated R Axis -8 degrees      Calculated T Axis 118 degrees      Diagnosis --     AV dual-paced rhythm  Abnormal ECG  When compared with ECG of 28-AUG-2019 09:42,  No significant change was found  Confirmed by Collin Estrada ([id number]) on 7/2/2021 6:59:50 AM            All Micro Results     Procedure Component Value Units Date/Time    CULTURE, BLOOD [[id number]] Collected: 07/01/21 2229    Order Status: Completed Specimen: Blood Updated: 07/02/21 0702     Special Requests: --        RIGHT  HAND       Culture result: NO GROWTH AFTER 8 HOURS       CULTURE, BLOOD [[id number]] Collected: 07/01/21 1949    Order Status: Completed Specimen: Blood Updated: 07/02/21 0702     Special Requests: --        LEFT  Antecubital       Culture result: NO GROWTH AFTER 8 HOURS             Other Studies:  CT ABD PELV W CONT    Result Date: 7/1/2021  CT ABDOMEN AND PELVIS WITH CONTRAST HISTORY: Lower abdominal pain COMPARISON: None TECHNIQUE: Helical imaging was performed from the lung bases through the ischial tuberosities during the intravenous infusion of 100 cc of Isovue-370. Oral contrast was not administered. Coronal and sagittal reformats were performed. Dose reduction techniques used: Automated exposure control, adjustment of the mAs and/or kVp according to patient's size, and iterative reconstruction techniques. FINDINGS: *  LUNG BASES: Pacemaker. Mural calcifications of the left atrial *  LIVER: Within normal limits. *  GALLBLADDER AND BILE DUCTS: Cholecystectomy. *  SPLEEN: Within normal limits. *  URINARY TRACT: Status post resection of a duplicate right kidney. *  ADRENALS: Within normal limits. *  PANCREAS: Within normal limits. *  GASTROINTESTINAL TRACT: Sigmoid diverticulosis without evidence of diverticulitis. *  RETROPERITONEUM: Within normal limits. *  PERITONEAL CAVITY AND ABDOMINAL WALL: Within normal limits. *  PELVIS: Within normal limits. *  SPINE / BONES: Within normal limits. *  OTHER COMMENTS: None. Sigmoid diverticulitis. Mural calcifications of the left atrium of uncertain etiology. She has had a prior ablation Cholecystectomy. Status post resection of a duplicated right kidney.  Date of Dictation: 7/1/2021 5:55 PM         Medications:  Medications Administered      Medications Administered     cefTRIAXone (ROCEPHIN) 1 g in 0.9% sodium chloride (MBP/ADV) 50 mL MBP     Admin Date  07/01/2021 Action  New Bag Dose  1 g Rate  100 mL/hr Route  IntraVENous Administered By  Joyce Montez RN          HYDROmorphone (DILAUDID) injection 0.3 mg     Admin Date  07/01/2021 Action  Given Dose  0.3 mg Route  IntraVENous Administered By  Joyce Montez RN          iopamidoL (ISOVUE-370) 76 % injection 100 mL     Admin Date  07/01/2021 Action  Given Dose  100 mL Route  IntraVENous Administered By  Dellie Schlatter P          ondansetron [facility]) injection 4 mg     Admin Date  07/01/2021 Action  Given Dose  4 mg Route  IntraVENous Administered By  Joyce Montez RN          saline peripheral flush soln 10 mL     Admin Date  07/01/2021 Action  Given Dose  10 mL Route  InterCATHeter Administered By  Tamanna BRADSHAW          sodium chloride 0.9 % bolus infusion 1,000 mL     Admin Date  07/01/2021 Action  New Bag Dose  1,000 mL Rate  500 mL/hr Route  IntraVENous Administered By  Evelyn Alston RN           Admin Date  07/01/2021 Action  New Bag Dose  1,000 mL Rate  500 mL/hr Route  IntraVENous Administered By  Evelyn Alston RN          sodium chloride 0.9 % bolus infusion 100 mL     Admin Date  07/01/2021 Action  New Bag Dose  100 mL Rate   Route  IntraVENous Administered By  Jennifer Fontenot                      Problem List:     Hospital Problems as of 7/2/2021 Date Reviewed: 2/25/2021        Codes Class Noted - Resolved POA    * (Principal) Diverticulitis ICD-10-CM: K57.92  ICD-9-CM: 562.11  7/1/2021 - Present Unknown        Dyslipidemia (Chronic) ICD-10-CM: E78.5  ICD-9-CM: 272.4  8/28/2019 - Present Yes        Congest[facility] ([facility]F) ([facility] ICD-10-CM: I50.9  ICD-9-CM: 428.0  10/29/2015 - Present Yes        Paroxysmal atrial fibrillation ([facility] [address] ICD-10-CM: I48.0  ICD-9-CM: 427.31  8/20/2014 - Present Yes                 Signed By: Radha Rodriguez MD   [employer] [facility]    July 2, 2021  4:22 PM

## 2021-07-02 NOTE — PROGRESS NOTES
TRANSFER - IN REPORT:    Verbal report received from PRABHA Mitchell on [facility]  being received from ED for routine progression of care      Report consisted of patients Situation, Background, Assessment and   Recommendations(SBAR). Information from the following report(s) SBAR was reviewed with the receiving nurse. Opportunity for questions and clarification was provided. Assessment completed upon patients arrival to unit and care assumed.

## 2021-07-02 NOTE — CONSULTS
Surgery Consult      Patient: Alicia Green 78 y.o. female     Consulting Physician:  hospitalist    Chief Complaint: diverticulitis, leukocytosis    Subjective: Taisha Ravi is a 78 y.o. female who surgery has been asked to see for evaluation of diverticulitis with a significant leukocytosis. This is described as new . Onset was 2 days ago. Symptoms have been rapidly improving since admission. Aggravating factors: none; she wonders if second COVID vaccine is associated. Alleviating factors: antibiotic. Associated symptoms: lower abd pain, nausea/vomiting. The patient denies fever, chills, diarrhea, BRBPR. She awoke at 0100 with substernal/epigastric pressure, then noted exacerbation of a chronic, intermittent lower abdominal pain. She had nausea with 3 \"small episodes\" of emesis. She has intermittent lower abdominal pains at home, labeled as diverticulitis, which last 1-2 days and resolve spontaneously. She is also suspicious of pelvic pain from post-hysterectomy adhesions. She is up to date on colonoscopy (Dr Ronald Yee). She has never been hospitalized for diverticulitis. Past Medical History:   Diagnosis Date    Accelerated hypertension 8/20/2014    Aortic valve insufficiency 8/20/2014    Arrhythmia     at fib    Arthritis     Atrial fibrillation ([facility] [address] 8/20/2014    CAD (coronary artery disease)     Cancer (HCC)     basal    Congestive heart failure (CHF) ([address] 10/29/2015    Coronary atherosclerosis 10/29/2015    GERD (gastroesophageal reflux disease)     Hypertension     Hypokalemia 8/20/2014    Nausea & vomiting     Pacemaker 8/20/2014    Thyroid disease     Unstable angina ([address] 8/20/2014       Past Surgical History:   Procedure Laterality Date    ABDOMEN SURGERY PROC UNLISTED      gallbladder    CARDIAC SURG PROCEDURE UNLIST      card cath stents,at fib ablation    HX BREAST BIOPSY Left     Lt breast approx. 25 yrs ago.     HX GYN      total hysterectomy    HX OTHER SURGICAL      nephrectomy  3rd kidney    HX PACEMAKER          Family History   Problem Relation Age of Onset    Heart Disease Mother     Heart Disease Brother     Heart Disease Brother     Breast Cancer Neg Hx        Social History     Socioeconomic History    Marital status:      Spouse name: Not on file    Number of children: Not on file    Years of education: Not on file    Highest education level: Not on file   Tobacco Use    Smoking status: Former Smoker     Quit date: 10/29/1985     Years since quittin.6    Smokeless tobacco: Never Used   Substance and Sexual Activity    Alcohol use: No     Social Determinants of Health     Financial Resource Strain:     Difficulty of Paying Living Expenses:    Food Insecurity:     Worried About Running Out of Food in the Last Year:     Ran Out of Food in the Last Year:    Transportation Needs:     Lack of Transportation (Medical):      Lack of Transportation (Non-Medical):    Physical Activity:     Days of Exercise per Week:     Minutes of Exercise per Session:    Stress:     Feeling of Stress :    Social Connections:     Frequency of Communication with Friends and Family:     Frequency of Social Gatherings with Friends and Family:     Attends Orthodox Services:     Active Member of Clubs or Organizations:     Attends Club or Organization Meetings:     Marital Status:         Current Facility-Administered Medications   Medication Dose Route Frequency    sodium chloride (NS) flush 5-10 mL  5-10 mL IntraVENous Q8H    sodium chloride (NS) flush 5-10 mL  5-10 mL IntraVENous PRN    sodium chloride (NS) flush 5-40 mL  5-40 mL IntraVENous Q8H    sodium chloride (NS) flush 5-40 mL  5-40 mL IntraVENous PRN    acetaminophen (TYLENOL) tablet 650 mg  650 mg Oral Q6H PRN    Or    acetaminophen (TYLENOL) suppository 650 mg  650 mg Rectal Q6H PRN    polyethylene glycol (MIRALAX) packet 17 g  17 g Oral DAILY PRN    piperacillin-tazobactam (ZOSYN) 3.375 g in 0.9% sodium chloride (MBP/ADV) 100 mL MBP  3.375 g IntraVENous Q8H    clopidogreL (PLAVIX) tablet 75 mg  75 mg Oral DAILY    warfarin (COUMADIN) tablet 2.5 mg  2.5 mg Oral QPM    levothyroxine (SYNTHROID) tablet 175 mcg  175 mcg Oral DAILY    0.9% sodium chloride infusion  50 mL/hr IntraVENous CONTINUOUS    sotaloL (BETAPACE) tablet 120 mg  120 mg Oral DAILY    HYDROmorphone (DILAUDID) injection 0.2 mg  0.2 mg IntraVENous Q3H PRN    ondansetron (ZOFRAN) injection 4 mg  4 mg IntraVENous Q4H PRN        Allergies   Allergen Reactions    Atorvastatin Myalgia    Codeine Nausea Only    Dilaudid [Hydromorphone] Unknown (comments)    Morphine Unknown (comments)    Simvastatin Myalgia    Sulfa (Sulfonamide Antibiotics) Swelling       Review of Systems:  A comprehensive review of systems was negative except for: Musculoskeletal: positive for right arm, low back pain since COVID second vaccine   She is otherwise well, with controlled comorbidities except as per HPI    Objective:        Patient Vitals for the past 8 hrs:   BP Temp Pulse Resp SpO2   21 0800 114/66 98.2 °F (36.8 °C) 74 16 95 %       Temp (24hrs), Av °F (36.7 °C), Min:97.7 °F (36.5 °C), Max:98.8 °F (37.1 °C)      Physical Exam:  GENERAL: alert, cooperative, no distress, appears stated age, EYE: negative findings: anicteric sclera and lids and lashes normal, LUNG: clear to auscultation bilaterally, normal effort, HEART: irregular, ABDOMEN: soft, nondistended. Mild LLQ tenderness extending to suprapubic area.  Bowel sounds normal. No masses,  no organomegaly, EXTREMITIES:  extremities normal, atraumatic, no cyanosis or edema, SKIN: Normal., NEUROLOGIC: negative findings: alert, oriented x3  speech normal in context and clarity  memory intact grossly  cranial nerves II-XII intact, PSYCHIATRIC: non focal      Labs:   Recent Results (from the past 24 hour(s))   CBC WITH AUTOMATED DIFF    Collection Time: 21  3:37 PM Result Value Ref Range    WBC 42.0 (H) 4.3 - 11.1 K/uL    RBC 4.33 4.05 - 5.2 M/uL    HGB 12.4 11.7 - 15.4 g/dL    HCT 39.1 35.8 - 46.3 %    MCV 90.3 79.6 - 97.8 FL    MCH 28.6 26.1 - 32.9 PG    MCHC 31.7 31.4 - 35.0 g/dL    RDW 16.3 (H) 11.9 - 14.6 %    PLATELET 374 597 - 784 K/uL    MPV 10.4 9.4 - 12.3 FL    ABSOLUTE NRBC 0.03 0.0 - 0.2 K/uL    NEUTROPHILS 83 (H) 43 - 78 %    LYMPHOCYTES 4 (L) 13 - 44 %    MONOCYTES 7 4.0 - 12.0 %    EOSINOPHILS 0 (L) 0.5 - 7.8 %    BASOPHILS 0 0.0 - 2.0 %    IMMATURE GRANULOCYTES 6 (H) 0.0 - 5.0 %    ABS. NEUTROPHILS 34.9 (H) 1.7 - 8.2 K/UL    ABS. LYMPHOCYTES 1.7 0.5 - 4.6 K/UL    ABS. MONOCYTES 2.9 (H) 0.1 - 1.3 K/UL    ABS. EOSINOPHILS 0.0 0.0 - 0.8 K/UL    ABS. BASOPHILS 0.0 0.0 - 0.2 K/UL    ABS. IMM. GRANS. 2.5 (H) 0.0 - 0.5 K/UL    RBC COMMENTS SLIGHT  ANISOCYTOSIS + POIKILOCYTOSIS        RBC COMMENTS OCCASIONAL  POIKILOCYTOSIS        WBC COMMENTS Result Confirmed By Smear      PLATELET COMMENTS ADEQUATE      DF AUTOMATED     METABOLIC PANEL, COMPREHENSIVE    Collection Time: 07/01/21  3:37 PM   Result Value Ref Range    Sodium 137 136 - 145 mmol/L    Potassium 3.8 3.5 - 5.1 mmol/L    Chloride 102 98 - 107 mmol/L    CO2 29 21 - 32 mmol/L    Anion gap 6 (L) 7 - 16 mmol/L    Glucose 143 (H) 65 - 100 mg/dL    BUN 31 (H) 8 - 23 MG/DL    Creatinine 1.39 (H) 0.6 - 1.0 MG/DL    GFR est AA 47 (L) >60 ml/min/1.73m2    GFR est non-AA 38 (L) >60 ml/min/1.73m2    Calcium 8.5 8.3 - 10.4 MG/DL    Bilirubin, total 0.8 0.2 - 1.1 MG/DL    ALT (SGPT) 39 12 - 65 U/L    AST (SGOT) 39 (H) 15 - 37 U/L    Alk.  phosphatase 88 50 - 136 U/L    Protein, total 6.3 6.3 - 8.2 g/dL    Albumin 2.8 (L) 3.2 - 4.6 g/dL    Globulin 3.5 2.3 - 3.5 g/dL    A-G Ratio 0.8 (L) 1.2 - 3.5     LIPASE    Collection Time: 07/01/21  3:37 PM   Result Value Ref Range    Lipase 65 (L) 73 - 393 U/L   URINE MICROSCOPIC    Collection Time: 07/01/21  5:05 PM   Result Value Ref Range    WBC >100 0 /hpf    RBC 3-5 0 /hpf Epithelial cells 3-5 0 /hpf    Bacteria 1+ (H) 0 /hpf    Casts HYALINE 0 /lpf    Crystals, urine 0 0 /LPF    Mucus 1+ (H) 0 /lpf    Other observations RESULTS VERIFIED MANUALLY     PROTHROMBIN TIME + INR    Collection Time: 07/01/21  5:34 PM   Result Value Ref Range    Prothrombin time 24.9 (H) 12.5 - 14.7 sec    INR 2.2     LACTIC ACID    Collection Time: 07/01/21  5:34 PM   Result Value Ref Range    Lactic acid 3.2 (H) 0.4 - 2.0 MMOL/L   CULTURE, BLOOD    Collection Time: 07/01/21  7:49 PM    Specimen: Blood   Result Value Ref Range    Special Requests: LEFT  Antecubital        Culture result: NO GROWTH AFTER 8 HOURS     LACTIC ACID    Collection Time: 07/01/21  7:50 PM   Result Value Ref Range    Lactic acid 3.1 (H) 0.4 - 2.0 MMOL/L   CULTURE, BLOOD    Collection Time: 07/01/21 10:29 PM    Specimen: Blood   Result Value Ref Range    Special Requests: RIGHT  HAND        Culture result: NO GROWTH AFTER 8 HOURS     EKG, 12 LEAD, INITIAL    Collection Time: 07/01/21 11:19 PM   Result Value Ref Range    Ventricular Rate 70 BPM    Atrial Rate 70 BPM    P-R Interval 174 ms    QRS Duration 154 ms    Q-T Interval 464 ms    QTC Calculation (Bezet) 501 ms    Calculated P Axis 50 degrees    Calculated R Axis -8 degrees    Calculated T Axis 118 degrees    Diagnosis       AV dual-paced rhythm  Abnormal ECG  When compared with ECG of 28-AUG-2019 09:42,  No significant change was found  Confirmed by Linda Gonzalez ([id number]) on 7/2/2021 3:05:47 AM     METABOLIC PANEL, COMPREHENSIVE    Collection Time: 07/02/21  5:14 AM   Result Value Ref Range    Sodium 143 136 - 145 mmol/L    Potassium 3.5 3.5 - 5.1 mmol/L    Chloride 109 (H) 98 - 107 mmol/L    CO2 29 21 - 32 mmol/L    Anion gap 5 (L) 7 - 16 mmol/L    Glucose 103 (H) 65 - 100 mg/dL    BUN 27 (H) 8 - 23 MG/DL    Creatinine 0.86 0.6 - 1.0 MG/DL    GFR est AA >60 >60 ml/min/1.73m2    GFR est non-AA >60 >60 ml/min/1.73m2    Calcium 8.1 (L) 8.3 - 10.4 MG/DL    Bilirubin, total 0.7 0.2 - 1.1 MG/DL    ALT (SGPT) 27 12 - 65 U/L    AST (SGOT) 22 15 - 37 U/L    Alk. phosphatase 78 50 - 136 U/L    Protein, total 5.7 (L) 6.3 - 8.2 g/dL    Albumin 2.5 (L) 3.2 - 4.6 g/dL    Globulin 3.2 2.3 - 3.5 g/dL    A-G Ratio 0.8 (L) 1.2 - 3.5     MAGNESIUM    Collection Time: 07/02/21  5:14 AM   Result Value Ref Range    Magnesium 2.1 1.8 - 2.4 mg/dL   CBC WITH AUTOMATED DIFF    Collection Time: 07/02/21  5:14 AM   Result Value Ref Range    WBC 31.2 (H) 4.3 - 11.1 K/uL    RBC 3.89 (L) 4.05 - 5.2 M/uL    HGB 11.0 (L) 11.7 - 15.4 g/dL    HCT 34.7 (L) 35.8 - 46.3 %    MCV 89.2 79.6 - 97.8 FL    MCH 28.3 26.1 - 32.9 PG    MCHC 31.7 31.4 - 35.0 g/dL    RDW 16.8 (H) 11.9 - 14.6 %    PLATELET 178 212 - 384 K/uL    MPV 10.5 9.4 - 12.3 FL    ABSOLUTE NRBC 0.00 0.0 - 0.2 K/uL    DF AUTOMATED      NEUTROPHILS 79 (H) 43 - 78 %    LYMPHOCYTES 6 (L) 13 - 44 %    MONOCYTES 9 4.0 - 12.0 %    EOSINOPHILS 1 0.5 - 7.8 %    BASOPHILS 0 0.0 - 2.0 %    IMMATURE GRANULOCYTES 5 0.0 - 5.0 %    ABS. NEUTROPHILS 24.6 (H) 1.7 - 8.2 K/UL    ABS. LYMPHOCYTES 1.8 0.5 - 4.6 K/UL    ABS. MONOCYTES 2.9 (H) 0.1 - 1.3 K/UL    ABS. EOSINOPHILS 0.2 0.0 - 0.8 K/UL    ABS. BASOPHILS 0.1 0.0 - 0.2 K/UL    ABS. IMM. GRANS. 1.6 (H) 0.0 - 0.5 K/UL   PROTHROMBIN TIME + INR    Collection Time: 07/02/21  5:14 AM   Result Value Ref Range    Prothrombin time 25.2 (H) 12.5 - 14.7 sec    INR 2.3         Data Review   CT Results (most recent):  Results from [facility] encounter on 07/01/21    CT ABD PELV W CONT    Narrative  CT ABDOMEN AND PELVIS WITH CONTRAST    HISTORY: Lower abdominal pain    COMPARISON: None    TECHNIQUE: Helical imaging was performed from the lung bases through the ischial  tuberosities during the intravenous infusion of 100 cc of Isovue-370. Oral  contrast was not administered. Coronal and sagittal reformats were performed. Dose reduction techniques used:  Automated exposure control, adjustment of the  mAs and/or kVp according to patient's size, and iterative reconstruction  techniques. FINDINGS:  *  LUNG BASES: Pacemaker. Mural calcifications of the left atrial  *  LIVER: Within normal limits. *  GALLBLADDER AND BILE DUCTS: Cholecystectomy. *  SPLEEN: Within normal limits. *  URINARY TRACT: Status post resection of a duplicate right kidney. *  ADRENALS: Within normal limits. *  PANCREAS: Within normal limits. *  GASTROINTESTINAL TRACT: Sigmoid diverticulosis without evidence of  diverticulitis. *  RETROPERITONEUM: Within normal limits. *  PERITONEAL CAVITY AND ABDOMINAL WALL: Within normal limits. *  PELVIS: Within normal limits. *  SPINE / BONES: Within normal limits. *  OTHER COMMENTS: None. Impression  Sigmoid diverticulitis. Mural calcifications of the left atrium of uncertain etiology. She has had a  prior ablation    Cholecystectomy. Status post resection of a duplicated right kidney. Date of Dictation: 7/1/2021 5:55 PM     PER MY REVIEW OF CT IMAGES, there is at best minimal diverticulitis- I suspect changes are chronic and not indicative of an acute episode. Note is made of inconsistency between body and impression of the official report. I favor no acute diverticulitis. There is certainly no evidence of any lisa-colonic process such as abscess/phlegmon and no evidence of perforation. There is no other overt intraperitoneal infectious/inflammatory process    Assessment:     LLQ pain  Diverticulosis, possible diverticulitis  Leukocytosis  Abnormal UA-(+)Bacteria and WBC    Plan:     Discussed traditional, standard indications for surgery in diverticulitis with pt and family- perforation/fistula, intractability, or numerous recurrences. She has none of these; this is her first hospitalization for diverticulitis (maybe) and I am not sure her 24-48hr episodes at home are truly diverticulitis. I would encourage discussion with radiologist regarding correcting the incongruity in the CT report.     There is no link between absolute value of leukocytosis and any need for surgery. This patient has absolutely no indications for surgery; she would be at significantly elevated risk for postoperative complications from any surgical intervention, anyway. Pt/family questions discussed.       Signed By: Norma Parra MD     July 2, 2021

## 2021-07-02 NOTE — PROGRESS NOTES
07/01/21 2121   Dual Skin Pressure Injury Assessment   Dual Skin Pressure Injury Assessment WDL   Second Care Provider (Based on [address] [place] [address]) PRABHA Newman   Skin Integumentary   Skin Integumentary (WDL) X    Pressure  Injury Documentation No Pressure Injury Noted-Pressure Ulcer Prevention Initiated   Skin Color Appropriate for ethnicity; Ecchymosis (comment); Other (comment)  (ecchymosis scattered, psoriasis on bottoms of feet )   Skin Condition/Temp Warm;Dry;Fragile;Flaky   Skin Integrity Scars (comment)   Turgor Epidermis thin w/ loss of subcut tissue   Hair Growth Sparce   Nails WDL   Wound Prevention and Protection Methods   Orientation of Wound Prevention Posterior   Location of Wound Prevention Sacrum/Coccyx; Heel   Dressing Present  No   Wound Offloading (Prevention Methods) Bed, pressure reduction mattress;Elevate heels;Pillows;Repositioning

## 2021-07-02 NOTE — PROGRESS NOTES
Warfarin dosing per pharmacist    Khai Mehta is a 78 y.o. female. Indication:  Afib    Goal INR:  2-3    Home dose: 2.5 mg daily     Risk factors or significant drug interactions: zosyn    Other anticoagulants:  no    Daily Monitoring  Date  INR     Warfarin dose HGB              Notes  7/1  2.2  2.5 mg  12.4  7/2  2.3  2.5 mg      Pharmacy consulted to assist in dosing warfarin for Ms. Green this admission. Patient's home dosing regimen confirmed with most recent outpatient anticoagulation notes. INR therapeutic today at 2.3. Since admission patient has been initiated on Zosyn which can elevate the INR, of note patient's albumin is also low. Will continue patient's home dose of 2.5 mg this evening. Daily INR's. Pharmacy will continue to monitor and make dose adjustments as clinically indicated.      Thank you,  Power Taylor, PharmD  Clinical Pharmacy Specialist  [phone]

## 2021-07-02 NOTE — ED NOTES
TRANSFER - OUT REPORT:    Verbal report given to Donny Funes RN on [facility]  being transferred to Room #699 for routine progression of care       Report consisted of patients Situation, Background, Assessment and   Recommendations(SBAR). Information from the following report(s) SBAR, Kardex, ED Summary, Intake/Output, MAR and Recent Results was reviewed with the receiving nurse. Lines:   Peripheral IV 07/01/21 Right Forearm (Active)   Site Assessment Clean, dry, & intact 07/01/21 1732   Phlebitis Assessment 0 07/01/21 1732   Infiltration Assessment 0 07/01/21 1732   Dressing Status Clean, dry, & intact 07/01/21 1732   Dressing Type Transparent 07/01/21 1732   Hub Color/Line Status Pink 07/01/21 1732       Peripheral IV 07/01/21 Left Antecubital (Active)   Site Assessment Clean, dry, & intact 07/01/21 1948   Phlebitis Assessment 0 07/01/21 1948   Infiltration Assessment 0 07/01/21 1948   Dressing Status Clean, dry, & intact 07/01/21 1948   Dressing Type Transparent 07/01/21 1948   Hub Color/Line Status Pink 07/01/21 1948        Opportunity for questions and clarification was provided.       Patient transported with:   [employer]

## 2021-07-02 NOTE — PROGRESS NOTES
Warfarin dosing per pharmacist    Juan Miguel Duval is a 78 y.o. female. Indication:  afib    Goal INR:  2-3    Home dose: 2.5mg daily based of anticoag visit on 6/11/21    Risk factors or significant drug interactions:  zosyn can effect INR    Other anticoagulants:  no    Daily Monitoring  Date  INR     Warfarin dose HGB              Notes  07/01/21           2.2          2.5mg  12.4      Pt admitted for diverticulitis, INR is therapeutic at 2.2, No signs or symptoms of bleeding noted. Pt started on antibiotic which can effect INR as well as pt's albumin is low. Pharmacy to re-order home dose of 2.5mg.  Then monitor INR daily and adjust dose per protocol    Thank you for this consult,  Tanya Nesbitt, PharmD

## 2021-07-03 LAB
BASOPHILS # BLD: 0 K/UL (ref 0–0.2)
BASOPHILS NFR BLD: 0 % (ref 0–2)
DIFFERENTIAL METHOD BLD: ABNORMAL
EOSINOPHIL # BLD: 0.5 K/UL (ref 0–0.8)
EOSINOPHIL NFR BLD: 4 % (ref 0.5–7.8)
ERYTHROCYTE [DISTWIDTH] IN BLOOD BY AUTOMATED COUNT: 16.6 % (ref 11.9–14.6)
HCT VFR BLD AUTO: 34.8 % (ref 35.8–46.3)
HGB BLD-MCNC: 10.9 G/DL (ref 11.7–15.4)
IMM GRANULOCYTES # BLD AUTO: 0.1 K/UL (ref 0–0.5)
IMM GRANULOCYTES NFR BLD AUTO: 1 % (ref 0–5)
INR PPP: 2.3
LYMPHOCYTES # BLD: 1.3 K/UL (ref 0.5–4.6)
LYMPHOCYTES NFR BLD: 11 % (ref 13–44)
MCH RBC QN AUTO: 28.8 PG (ref 26.1–32.9)
MCHC RBC AUTO-ENTMCNC: 31.3 G/DL (ref 31.4–35)
MCV RBC AUTO: 92.1 FL (ref 79.6–97.8)
MONOCYTES # BLD: 1.4 K/UL (ref 0.1–1.3)
MONOCYTES NFR BLD: 11 % (ref 4–12)
NEUTS SEG # BLD: 8.9 K/UL (ref 1.7–8.2)
NEUTS SEG NFR BLD: 72 % (ref 43–78)
NRBC # BLD: 0 K/UL (ref 0–0.2)
PLATELET # BLD AUTO: 145 K/UL (ref 150–450)
PMV BLD AUTO: 10.7 FL (ref 9.4–12.3)
PROTHROMBIN TIME: 25.6 SEC (ref 12.5–14.7)
RBC # BLD AUTO: 3.78 M/UL (ref 4.05–5.2)
WBC # BLD AUTO: 12.4 K/UL (ref 4.3–11.1)

## 2021-07-03 PROCEDURE — 85610 PROTHROMBIN TIME: CPT

## 2021-07-03 PROCEDURE — 36415 COLL VENOUS BLD VENIPUNCTURE: CPT

## 2021-07-03 PROCEDURE — 74011250637 HC RX REV CODE- 250/637: Performed by: INTERNAL MEDICINE

## 2021-07-03 PROCEDURE — 65270000029 HC RM PRIVATE

## 2021-07-03 PROCEDURE — 74011000258 HC RX REV CODE- 258: Performed by: HOSPITALIST

## 2021-07-03 PROCEDURE — 74011250636 HC RX REV CODE- 250/636: Performed by: FAMILY MEDICINE

## 2021-07-03 PROCEDURE — 74011250636 HC RX REV CODE- 250/636: Performed by: HOSPITALIST

## 2021-07-03 PROCEDURE — 74011250637 HC RX REV CODE- 250/637: Performed by: HOSPITALIST

## 2021-07-03 PROCEDURE — 85025 COMPLETE CBC W/AUTO DIFF WBC: CPT

## 2021-07-03 RX ADMIN — CLOPIDOGREL BISULFATE 75 MG: 75 TABLET ORAL at 21:43

## 2021-07-03 RX ADMIN — Medication 10 ML: at 13:07

## 2021-07-03 RX ADMIN — HYDROMORPHONE HYDROCHLORIDE 0.2 MG: 1 INJECTION, SOLUTION INTRAMUSCULAR; INTRAVENOUS; SUBCUTANEOUS at 04:41

## 2021-07-03 RX ADMIN — Medication 10 ML: at 06:26

## 2021-07-03 RX ADMIN — PIPERACILLIN SODIUM AND TAZOBACTAM SODIUM 3.38 G: 3; .375 INJECTION, POWDER, LYOPHILIZED, FOR SOLUTION INTRAVENOUS at 04:38

## 2021-07-03 RX ADMIN — SOTALOL HYDROCHLORIDE 120 MG: 80 TABLET ORAL at 08:18

## 2021-07-03 RX ADMIN — PIPERACILLIN SODIUM AND TAZOBACTAM SODIUM 3.38 G: 3; .375 INJECTION, POWDER, LYOPHILIZED, FOR SOLUTION INTRAVENOUS at 21:42

## 2021-07-03 RX ADMIN — Medication 10 ML: at 21:48

## 2021-07-03 RX ADMIN — PIPERACILLIN SODIUM AND TAZOBACTAM SODIUM 3.38 G: 3; .375 INJECTION, POWDER, LYOPHILIZED, FOR SOLUTION INTRAVENOUS at 12:26

## 2021-07-03 RX ADMIN — HYDROMORPHONE HYDROCHLORIDE 0.2 MG: 1 INJECTION, SOLUTION INTRAMUSCULAR; INTRAVENOUS; SUBCUTANEOUS at 21:42

## 2021-07-03 RX ADMIN — LEVOTHYROXINE SODIUM 175 MCG: 0.12 TABLET ORAL at 06:25

## 2021-07-03 RX ADMIN — WARFARIN SODIUM 2.5 MG: 2.5 TABLET ORAL at 17:54

## 2021-07-03 RX ADMIN — SOTALOL HYDROCHLORIDE 120 MG: 80 TABLET ORAL at 21:42

## 2021-07-03 RX ADMIN — Medication 5 ML: at 04:42

## 2021-07-03 RX ADMIN — ONDANSETRON 4 MG: 2 INJECTION INTRAMUSCULAR; INTRAVENOUS at 21:42

## 2021-07-03 NOTE — PROGRESS NOTES
Pt sitting up in chair with family at bedside. Pt states she feels much better. Ate well for dinner. Denies any pain or nausea.

## 2021-07-03 NOTE — PROGRESS NOTES
Problem: Falls - Risk of  Goal: *Absence of Falls  Description: Document Laz Briggs Fall Risk and appropriate interventions in the flowsheet.   Outcome: Progressing Towards Goal  Note: Fall Risk Interventions:  Mobility Interventions: Bed/chair exit alarm, Patient to call before getting OOB, PT Consult for mobility concerns         Medication Interventions: Bed/chair exit alarm, Patient to call before getting OOB, Teach patient to arise slowly    Elimination Interventions: Bed/chair exit alarm, Call light in reach              Problem: Patient Education: Go to Patient Education Activity  Goal: Patient/Family Education  Outcome: Progressing Towards Goal

## 2021-07-03 NOTE — PROGRESS NOTES
[facility]ist Progress Note       Name:  Timm Collet  Age:81 y.o. Sex:female   :  1940    MRN:  [id number]   PCP:  Ashlee Finn MD      Admit Date:  2021  4:34 PM   Chief Complaint: Abdominal pain, nausea, vomiting    Reason for Admission:   Diverticulitis [K57.92]    Assessment & Plan:   MDM  Number of Diagnoses or Management Options     Amount and/or Complexity of Data Reviewed  Clinical lab tests: reviewed  Tests in the radiology section of CPT®: reviewed  Tests in the medicine section of CPT®: reviewed  Independent visualization of images, tracings, or specimens: yes    Risk of Complications, Morbidity, and/or Mortality  Presenting problems: high  Diagnostic procedures: high  Management options: moderate      Acute sigmoid diverticulitis:   2021-  - continue antibiotics, continue IV hydration gently as patient has history of congestive heart failure.   -Lactic acid has normalized  Previous surgery consult appreciated. She is not a candidate for any intervention. Chronic atrial fibrillation:   July 3, 2021  Rate is controlled  continue home medications including sotalol   Continue Coumadin. Dyslipidemia:   continue on statins. Hypothyroidism:   Continue on levothyroxine. Suspected sepsis- I suspect pt had sirs &/or sepsis on admission given her lactic acid of 3.2, wbc > 40 and underlying bowel infection. As she is on sotalol this would have impacted her ability to increase her hr appropriately.        Disposition/Expected LOS: 1-2 days depending on clinical course  Diet: DIET ADULT  VTE ppx: Patient is on Coumadin  GI ppx: none  Code status: Full Code  Surrogate decision-maker: self      History of Presenting Illness:     78years old female with past medical history of chronic atrial fibrillation on anticoagulation with Coumadin, history of hypertension, coronary artery disease presented to emergency room with chief complaint of nausea, vomiting, abdominal discomfort going on since this morning which continued to get worse. Patient abdominal pain is suprapubic, associated with nausea and 2 episodes of vomiting. No aggravating or relieving factors. Denies any diarrhea. Denies any chest pain, shortness of breath, cough or sputum production. Denies any fever, chills but reports feeling generalized weak. Patient  was at bedside helping with history. Evaluated in the ER, further work-up shows evidence of sigmoid diverticulitis, lab work shows evidence of elevated white count, emergency room physician requested admission of this patient for further evaluation and management. Patient was given antibiotics in the emergency room. 07/02/2021- pt seen - still with left upper quadrant abd pain. No rebound. Feels very and rundown. Afebrile over night. No further episodes of n,v or diarrhea. July 3, 2021 patient seen and evaluated left-sided abdominal pain persists but afebrile overnight with no episodes of nausea vomiting or chills    Review of Systems:  A 14 point review of systems was taken and pertinent positive as per HPI. Past Medical History:   Diagnosis Date    Accelerated hypertension 8/20/2014    Aortic valve insufficiency 8/20/2014    Arrhythmia     at fib    Arthritis     Atrial fibrillation ([facility] [address] 8/20/2014    CAD (coronary artery disease)     Cancer (HCC)     basal    Congestive heart failure (CHF) ([address] 10/29/2015    Coronary atherosclerosis 10/29/2015    GERD (gastroesophageal reflux disease)     Hypertension     Hypokalemia 8/20/2014    Nausea & vomiting     Pacemaker 8/20/2014    Thyroid disease     Unstable angina ([address] 8/20/2014       Past Surgical History:   Procedure Laterality Date    ABDOMEN SURGERY PROC UNLISTED      gallbladder    CARDIAC SURG PROCEDURE UNLIST      card cath stents,at fib ablation    HX BREAST BIOPSY Left     Lt breast approx. 25 yrs ago.     HX GYN      total hysterectomy    HX OTHER SURGICAL      nephrectomy  3rd kidney    HX PACEMAKER         Family History : reviewed  Family History   Problem Relation Age of Onset    Heart Disease Mother     Heart Disease Brother     Heart Disease Brother     Breast Cancer Neg Hx         Social History     Tobacco Use    Smoking status: Former Smoker     Quit date: 10/29/1985     Years since quittin.7    Smokeless tobacco: Never Used   Substance Use Topics    Alcohol use: No       Allergies   Allergen Reactions    Atorvastatin Myalgia    Codeine Nausea Only    Dilaudid [Hydromorphone] Unknown (comments)    Morphine Unknown (comments)    Simvastatin Myalgia    Sulfa (Sulfonamide Antibiotics) Swelling       Immunization History   Administered Date(s) Administered    Covid-19, MODERNA, Mrna, Lnp-s, Pf, 100mcg/0.5mL 2021, 2021         PTA Medications:  Current Outpatient Medications   Medication Instructions    acetaminophen (TYLENOL EXTRA STRENGTH) 500 mg tablet Oral, EVERY 6 HOURS AS NEEDED    Cholecalciferol, Vitamin D3, (VITAMIN D) 2,000 unit Cap 2,000 Int'l Units, DAILY    clopidogreL (PLAVIX) 75 mg, Oral, DAILY    ezetimibe (ZETIA) 10 mg, Oral, DAILY    famotidine (PEPCID) 20 mg, Oral, EVERY BEDTIME    fexofenadine (ALLEGRA) 180 mg, Oral, DAILY AS NEEDED    fluticasone (VERAMYST) 27.5 mcg/actuation nasal spray 2 Sprays, Nasal, DAILY, As needed     folic acid (FOLVITE) 1 mg, DAILY    furosemide (LASIX) 40 mg tablet As needed    halobetasol (ULTRAVATE) 0.05 % topical cream Topical, 2 TIMES DAILY, use thin layer     levothyroxine (SYNTHROID) 175 mcg, Oral, DAILY    losartan-hydroCHLOROthiazide (HYZAAR) 50-12.5 mg per tablet 1 Tablet, Oral, DAILY    methotrexate (RHEUMATREX) 2.5 mg, DAILY    nitroglycerin (NITROSTAT) 0.4 mg, Oral, EVERY 5 MIN AS NEEDED    omeprazole (PRILOSEC) 40 mg, DAILY    pitavastatin (LIVALO) 4 mg tab tablet Oral, DAILY    potassium chloride SA (K-DUR, KLOR-CON M20) 20 mEq tablet 20 mEq, DAILY    Premarin 0.625 mg, DAILY    sotaloL (BETAPACE) 120 mg, Oral, 2 TIMES DAILY    traMADoL (ULTRAM) 50 mg, EVERY 6 HOURS AS NEEDED    warfarin (Coumadin) 5 mg tablet 1/2-1 tab po daily or as directed. Objective:     Patient Vitals for the past 24 hrs:   Temp Pulse Resp BP SpO2   07/03/21 0547 98.1 °F (36.7 °C) 75 16 129/72 90 %   07/03/21 0050 98.1 °F (36.7 °C) 69 16 118/73 94 %   07/02/21 2215 98.2 °F (36.8 °C) 74 16 108/64 98 %   07/02/21 1600 98 °F (36.7 °C) 74 16 128/73 95 %   07/02/21 1200 98.1 °F (36.7 °C) 72 17 106/61 93 %   07/02/21 0800 98.2 °F (36.8 °C) 74 16 114/66 95 %       Oxygen Therapy  O2 Sat (%): 90 % (07/03/21 0547)  Pulse via Oximetry: 75 beats per minute (07/01/21 2001)  O2 Device: None (Room air) (07/01/21 1654)    Body mass index is 26.7 kg/m². Physical Exam:    General:  No acute distress, appears ill looking, speaking in full sentences  HEENT:  Pupils equal and reactive to light and accommodation, oropharynx is clear   Neck:   Supple, no lymphadenopathy, no JVD   Lungs:  Clear to auscultation bilaterally   CV:   Regular rate and rhythm with normal S1 and S2   Abdomen:  Soft, tender over left upper and lower quadrants, nondistended, normal active bowel sounds   Extremities:  No cyanosis clubbing or edema   Neuro:  Nonfocal, A&O x3   Psych:  Normal mood and affect       Data Reviewed: I have reviewed all labs, meds, and studies.       Recent Results (from the past 24 hour(s))   LACTIC ACID    Collection Time: 07/02/21  3:31 PM   Result Value Ref Range    Lactic acid 1.5 0.4 - 2.0 MMOL/L   GLUCOSE, POC    Collection Time: 07/02/21 10:14 PM   Result Value Ref Range    Glucose (POC) 106 (H) 65 - 100 mg/dL    Performed by [facility]    PROTHROMBIN TIME + INR    Collection Time: 07/03/21  6:03 AM   Result Value Ref Range    Prothrombin time 25.6 (H) 12.5 - 14.7 sec    INR 2.3         EKG Results     Procedure 720 Value Units Date/Time    EKG, 12 LEAD, INITIAL [[id number]] Collected: 07/01/21 2319    Order Status: Completed Updated: 07/02/21 0659     Ventricular Rate 70 BPM      Atrial Rate 70 BPM      P-R Interval 174 ms      QRS Duration 154 ms      Q-T Interval 464 ms      QTC Calculation (Bezet) 501 ms      Calculated P Axis 50 degrees      Calculated R Axis -8 degrees      Calculated T Axis 118 degrees      Diagnosis --     AV dual-paced rhythm  Abnormal ECG  When compared with ECG of 28-AUG-2019 09:42,  No significant change was found  Confirmed by Hernandez Zhou ([id number]) on 7/2/2021 6:59:50 AM            All Micro Results     Procedure Component Value Units Date/Time    CULTURE, BLOOD [[id number]] Collected: 07/01/21 1949    Order Status: Completed Specimen: Blood Updated: 07/03/21 0706     Special Requests: --        LEFT  Antecubital       Culture result: NO GROWTH 2 DAYS       CULTURE, BLOOD [[id number]] Collected: 07/01/21 2229    Order Status: Completed Specimen: Blood Updated: 07/03/21 0706     Special Requests: --        RIGHT  HAND       Culture result: NO GROWTH 2 DAYS             Other Studies:  No results found.       Medications:  Medications Administered      Medications Administered     cefTRIAXone (ROCEPHIN) 1 g in 0.9% sodium chloride (MBP/ADV) 50 mL MBP     Admin Date  07/01/2021 Action  New Bag Dose  1 g Rate  100 mL/hr Route  IntraVENous Administered By  Mich Garay, PRABHA          HYDROmorphone (DILAUDID) injection 0.3 mg     Admin Date  07/01/2021 Action  Given Dose  0.3 mg Route  IntraVENous Administered By  Mich Garay RN          iopamidoL (ISOVUE-370) 76 % injection 100 mL     Admin Date  07/01/2021 Action  Given Dose  100 mL Route  IntraVENous Administered By  Noemi BRADSHAW          ondansetron [facility]) injection 4 mg     Admin Date  07/01/2021 Action  Given Dose  4 mg Route  IntraVENous Administered By  Mich Garay RN          saline peripheral flush soln 10 mL     Admin Date  07/01/2021 Action  Given Dose  10 mL Route  InterCATHeter Administered By  Felicia BRADSHAW          sodium chloride 0.9 % bolus infusion 1,000 mL     Admin Date  07/01/2021 Action  New Bag Dose  1,000 mL Rate  500 mL/hr Route  IntraVENous Administered By  Alexandru Palumbo RN           Admin Date  07/01/2021 Action  New Bag Dose  1,000 mL Rate  500 mL/hr Route  IntraVENous Administered By  Alexandru Palumbo RN          sodium chloride 0.9 % bolus infusion 100 mL     Admin Date  07/01/2021 Action  New Bag Dose  100 mL Rate   Route  IntraVENous Administered By  Vilma Apodaca                      Problem List:     Hospital Problems as of 7/3/2021 Date Reviewed: 2/25/2021        Codes Class Noted - Resolved POA    * (Principal) Diverticulitis ICD-10-CM: K57.92  ICD-9-CM: 562.11  7/1/2021 - Present Unknown        Dyslipidemia (Chronic) ICD-10-CM: E78.5  ICD-9-CM: 272.4  8/28/2019 - Present Yes        Congest[facility] failure ([facility]F) ([facility] ICD-10-CM: I50.9  ICD-9-CM: 428.0  10/29/2015 - Present Yes        Paroxysmal atrial fibrillation ([facility] [address] ICD-10-CM: I48.0  ICD-9-CM: 427.31  8/20/2014 - Present Yes                 Signed By: Marisol Rodriguez MD   [facility]    July 3, 2021  4:22 PM

## 2021-07-03 NOTE — PROGRESS NOTES
Pt up in bathroom herself. Pt denies having a bowel movement, just \"gas\". Bowel sounds very active.  Pt ate only jello for breakfast.

## 2021-07-04 ENCOUNTER — APPOINTMENT (OUTPATIENT)
Dept: GENERAL RADIOLOGY | Age: 81
DRG: 872 | End: 2021-07-04
Attending: INTERNAL MEDICINE
Payer: MEDICARE

## 2021-07-04 VITALS
OXYGEN SATURATION: 95 % | HEIGHT: 65 IN | BODY MASS INDEX: 26.66 KG/M2 | RESPIRATION RATE: 18 BRPM | SYSTOLIC BLOOD PRESSURE: 135 MMHG | WEIGHT: 160 LBS | DIASTOLIC BLOOD PRESSURE: 72 MMHG | TEMPERATURE: 97.6 F | HEART RATE: 75 BPM

## 2021-07-04 PROBLEM — R65.10 SIRS (SYSTEMIC INFLAMMATORY RESPONSE SYNDROME) (HCC): Status: ACTIVE | Noted: 2021-07-03

## 2021-07-04 PROBLEM — A41.9 SEPSIS (HCC): Status: ACTIVE | Noted: 2021-07-03

## 2021-07-04 LAB
BASOPHILS # BLD: 0.1 K/UL (ref 0–0.2)
BASOPHILS NFR BLD: 1 % (ref 0–2)
DIFFERENTIAL METHOD BLD: ABNORMAL
EOSINOPHIL # BLD: 0.4 K/UL (ref 0–0.8)
EOSINOPHIL NFR BLD: 5 % (ref 0.5–7.8)
ERYTHROCYTE [DISTWIDTH] IN BLOOD BY AUTOMATED COUNT: 16.2 % (ref 11.9–14.6)
HCT VFR BLD AUTO: 33.7 % (ref 35.8–46.3)
HGB BLD-MCNC: 10.6 G/DL (ref 11.7–15.4)
IMM GRANULOCYTES # BLD AUTO: 0.1 K/UL (ref 0–0.5)
IMM GRANULOCYTES NFR BLD AUTO: 1 % (ref 0–5)
INR PPP: 2.2
LYMPHOCYTES # BLD: 1.4 K/UL (ref 0.5–4.6)
LYMPHOCYTES NFR BLD: 16 % (ref 13–44)
MCH RBC QN AUTO: 28.1 PG (ref 26.1–32.9)
MCHC RBC AUTO-ENTMCNC: 31.5 G/DL (ref 31.4–35)
MCV RBC AUTO: 89.4 FL (ref 79.6–97.8)
MONOCYTES # BLD: 1.1 K/UL (ref 0.1–1.3)
MONOCYTES NFR BLD: 13 % (ref 4–12)
NEUTS SEG # BLD: 5.4 K/UL (ref 1.7–8.2)
NEUTS SEG NFR BLD: 64 % (ref 43–78)
NRBC # BLD: 0 K/UL (ref 0–0.2)
PLATELET # BLD AUTO: 133 K/UL (ref 150–450)
PMV BLD AUTO: 10.1 FL (ref 9.4–12.3)
PROTHROMBIN TIME: 24.7 SEC (ref 12.5–14.7)
RBC # BLD AUTO: 3.77 M/UL (ref 4.05–5.2)
WBC # BLD AUTO: 8.4 K/UL (ref 4.3–11.1)

## 2021-07-04 PROCEDURE — 2709999900 HC NON-CHARGEABLE SUPPLY

## 2021-07-04 PROCEDURE — 71045 X-RAY EXAM CHEST 1 VIEW: CPT

## 2021-07-04 PROCEDURE — 74011250637 HC RX REV CODE- 250/637: Performed by: HOSPITALIST

## 2021-07-04 PROCEDURE — 74011250637 HC RX REV CODE- 250/637: Performed by: INTERNAL MEDICINE

## 2021-07-04 PROCEDURE — 74011250636 HC RX REV CODE- 250/636: Performed by: HOSPITALIST

## 2021-07-04 PROCEDURE — 36415 COLL VENOUS BLD VENIPUNCTURE: CPT

## 2021-07-04 PROCEDURE — 74011000258 HC RX REV CODE- 258: Performed by: HOSPITALIST

## 2021-07-04 PROCEDURE — 85025 COMPLETE CBC W/AUTO DIFF WBC: CPT

## 2021-07-04 PROCEDURE — 85610 PROTHROMBIN TIME: CPT

## 2021-07-04 RX ORDER — AMOXICILLIN AND CLAVULANATE POTASSIUM 875; 125 MG/1; MG/1
1 TABLET, FILM COATED ORAL EVERY 12 HOURS
Qty: 7 TABLET | Refills: 0 | Status: SHIPPED | OUTPATIENT
Start: 2021-07-04 | End: 2021-07-08

## 2021-07-04 RX ADMIN — LEVOTHYROXINE SODIUM 175 MCG: 0.12 TABLET ORAL at 05:32

## 2021-07-04 RX ADMIN — PIPERACILLIN SODIUM AND TAZOBACTAM SODIUM 3.38 G: 3; .375 INJECTION, POWDER, LYOPHILIZED, FOR SOLUTION INTRAVENOUS at 05:32

## 2021-07-04 RX ADMIN — Medication 10 ML: at 05:34

## 2021-07-04 RX ADMIN — ACETAMINOPHEN 650 MG: 325 TABLET ORAL at 05:35

## 2021-07-04 RX ADMIN — SOTALOL HYDROCHLORIDE 120 MG: 80 TABLET ORAL at 09:17

## 2021-07-04 NOTE — PROGRESS NOTES
Problem: Falls - Risk of  Goal: *Absence of Falls  Description: Document Val Brewster Fall Risk and appropriate interventions in the flowsheet.   Outcome: Progressing Towards Goal  Note: Fall Risk Interventions:  Mobility Interventions: Bed/chair exit alarm, Patient to call before getting OOB         Medication Interventions: Bed/chair exit alarm, Patient to call before getting OOB, Teach patient to arise slowly    Elimination Interventions: Bed/chair exit alarm, Call light in reach              Problem: Patient Education: Go to Patient Education Activity  Goal: Patient/Family Education  Outcome: Progressing Towards Goal

## 2021-07-04 NOTE — PROGRESS NOTES
Warfarin dosing per pharmacist    Anne Cardozo is a 78 y.o. female. Indication:  Afib    Goal INR:  2-3    Home dose: 2.5 mg daily     Risk factors or significant drug interactions: zosyn    Other anticoagulants:  no    Daily Monitoring  Date  INR     Warfarin dose HGB              Notes  7/1  2.2  2.5 mg  12.4  7/2  2.3  2.5 mg  ---  7/3  2.3  2.5 mg  10.9  7/4  2.2  2.5 mg  10.6    Pharmacy consulted to assist in dosing warfarin for Ms. Green this admission. Patient's home dosing regimen confirmed with most recent outpatient anticoagulation notes. INR therapeutic today at 2.2. Since admission patient has been initiated on Zosyn which can elevate the INR, of note patient's albumin is also low. Will continue patient's home dose of 2.5 mg this evening. Daily INR's. Pharmacy will continue to monitor and make dose adjustments as clinically indicated.      Thank you,  Henok Mata, PharmD, [address]  Clinical Pharmacy Specialist  [phone]

## 2021-07-04 NOTE — PROGRESS NOTES
Problem: Falls - Risk of  Goal: *Absence of Falls  Description: Document Zander Ramirez Fall Risk and appropriate interventions in the flowsheet.   Outcome: Progressing Towards Goal  Note: Fall Risk Interventions:  Mobility Interventions: Bed/chair exit alarm, Patient to call before getting OOB         Medication Interventions: Teach patient to arise slowly    Elimination Interventions: Call light in reach              Problem: Patient Education: Go to Patient Education Activity  Goal: Patient/Family Education  Outcome: Progressing Towards Goal

## 2021-07-04 NOTE — DISCHARGE SUMMARY
[facility] Hospitalist Discharge Summary     Name:  Roya Tong    Age:81 y.o. Sex:female  :  1940       MRN:  [id number]       Admitting Physician: Cory Pal MD Admit Date: 2021  4:34 PM   Attending Physician: Eugene Vilchis MD  Primary Care Physician: Amber Shirley MD       Discharge Physician: Destiny Damian MD  Discharge date: 21   Discharged Condition: Stable    Indication for Admission:   Chief Complaint   Patient presents with    Abdominal Pain        Reasons for hospitalization:  Hospital Problems as of 2021 Date Reviewed: 2021        Codes Class Noted - Resolved POA    SIRS (systemic inflammatory response syndrome) ([facility] ICD-10-CM: R65.10  ICD-9-CM: 995.90  7/3/2021 - Present Yes    Overview Signed 2021  1:38 PM by Eugene Vilchis MD     Suspected but not proven             Sepsis ([facility] ICD-10-CM: A41.9  ICD-9-CM: 038.9, 995.91  7/3/2021 - Present Yes    Overview Signed 2021  1:39 PM by Eugene Vilchis MD     Suspected but not proven             * (Principal) Diverticulitis ICD-10-CM: K57.92  ICD-9-CM: 562.11  2021 - Present Unknown        Dyslipidemia (Chronic) ICD-10-CM: E78.5  ICD-9-CM: 272.4  2019 - Present Yes        Congestive heart failure (CHF) ([facility] ICD-10-CM: I50.9  ICD-9-CM: 428.0  10/29/2015 - Present Yes        Paroxysmal atrial fibrillation ([facility] ICD-10-CM: I48.0  ICD-9-CM: 427.31  2014 - Present Yes                 Discharge Diagnosis: 1: Suspected Sirs/sepsis secondary to acute diverticulitis 2: Acute diverticulitis-clinical diagnosis.  3: None  Did Patient have Sepsis (YES OR NO): Suspected but not proven      Hospital Course/Interval history:  78years old female with past medical history of chronic atrial fibrillation on anticoagulation with Coumadin, history of hypertension, coronary artery disease presented to emergency room with chief complaint of nausea, vomiting, abdominal discomfort going on since this morning which continued to get worse. Patient abdominal pain is suprapubic, associated with nausea and 2 episodes of vomiting. No aggravating or relieving factors. Denies any diarrhea. Denies any chest pain, shortness of breath, cough or sputum production. Denies any fever, chills but reports feeling generalized weak. Patient  was at bedside helping with history. Evaluated in the ER, further work-up shows evidence of sigmoid diverticulitis, lab work shows evidence of elevated white count, emergency room physician requested admission of this patient for further evaluation and management. Patient was given antibiotics in the emergency room. Mrs. Zoya Dunn was admitted to the medicine inpatient unit and started on antibiotics for acute sigmoid diverticulitis. She was seen by general surgery who reviewed her films and suspected mild acute diverticulitis based on the review. However the CT report was incongruent in the body and in the conclusion. The body stated no diverticulitis in the conclusion stated sigmoid diverticulitis. She was suspected to have diverticulitis based on her clinical exam with severe left-sided abdominal pain that resolved with IV antibiotics, pain management and other supportive care. She was also suspected to have SIRS and/or sepsis based on her WBC elevation of greater than 40,000, clinical finding of acute diverticulitis and the known fact that she cannot mount an appropriate chronotropic response based on her intake of sotalol. Mrs. Zoya Dunn did eventually improve and today she is stable for discharge to home. She will follow-up with her primary care physician in 1 week.   She should also follow-up with gastroenterology in the next 2 to 4 weeks to be reevaluated for repeat colonoscopy.        Assessment/Plan:  Acute sigmoid diverticulitis:   -Resolving  -Suspected clinically given the history and physical examination and lab work-up  -She will complete oral antibiotics at home     Chronic atrial fibrillation:   Rate is controlled  continue home medications including sotalol   Continue Coumadin.     Dyslipidemia:   continue on statins.     Hypothyroidism:   Continue on levothyroxine.     Suspected sirs/sepsis- I suspect pt had sirs &/or sepsis on admission given her lactic acid of 3.2, wbc > 40 and underlying bowel infection. As she is on sotalol this would have impacted her ability to increase her hr appropriately.      Consults:   IP CONSULT TO GENERAL SURGERY     Disposition: Home or Self Care  Diet:   DIET ADULT  Code Status: Full Code      Follow up labs/imaging: Follow-up with your gastroenterologist for consideration of a repeat colonoscopy in the next 6 to 8 weeks  Follow up with your PCP in 1 week   pending labs/studies: None      Current Discharge Medication List      START taking these medications    Details   amoxicillin-clavulanate (AUGMENTIN) 875-125 mg per tablet Take 1 Tablet by mouth every twelve (12) hours for 7 doses. Indications: diverticulitis  Qty: 7 Tablet, Refills: 0  Start date: 7/4/2021, End date: 7/8/2021      Lactobacillus acidophilus-L. bulgaricus ([employer]) 100 million cell pwpk packet Take 1 Packet by mouth two (2) times a day for 7 days. Qty: 14 Packet, Refills: 0  Start date: 7/4/2021, End date: 7/11/2021         CONTINUE these medications which have NOT CHANGED    Details   halobetasol (ULTRAVATE) 0.05 % topical cream Apply  to affected area two (2) times a day. use thin layer      sotaloL (BETAPACE) 120 mg tablet Take 1 Tab by mouth two (2) times a day. Qty: 180 Tab, Refills: 3    Associated Diagnoses: Paroxysmal atrial fibrillation (HCC)      warfarin (Coumadin) 5 mg tablet 1/2-1 tab po daily or as directed. Qty: 90 Tab, Refills: 3    Associated Diagnoses: Paroxysmal atrial fibrillation (HCC)      clopidogreL (PLAVIX) 75 mg tab Take 1 Tab by mouth daily.   Qty: 90 Tab, Refills: 3    Associated Diagnoses: Atherosclerosis of native coronary artery of native heart without angina pectoris      furosemide (LASIX) 40 mg tablet As needed  Qty: 90 Tab, Refills: 3    Associated Diagnoses: Chronic diastolic congestive heart failure (HCC)      nitroglycerin (NITROSTAT) 0.4 mg SL tablet Take 1 Tab by mouth every five (5) minutes as needed for Chest Pain. Qty: 25 Tab, Refills: 3    Associated Diagnoses: Atherosclerosis of native coronary artery of native heart without angina pectoris      famotidine (PEPCID) 20 mg tablet Take 20 mg by mouth nightly. losartan-hydroCHLOROthiazide (HYZAAR) 50-12.5 mg per tablet Take 1 Tab by mouth daily. fexofenadine (ALLEGRA) 180 mg tablet Take 180 mg by mouth daily as needed. pitavastatin (LIVALO) 4 mg tab tablet Take  by mouth daily. ezetimibe (ZETIA) 10 mg tablet Take 10 mg by mouth daily. fluticasone (VERAMYST) 27.5 mcg/actuation nasal spray 2 Sprays by Nasal route daily. As needed      acetaminophen (TYLENOL EXTRA STRENGTH) 500 mg tablet Take  by mouth every six (6) hours as needed for Pain. Cholecalciferol, Vitamin D3, (VITAMIN D) 2,000 unit Cap Take 2,000 Int'l Units by mouth daily. omeprazole (PRILOSEC) 20 mg capsule Take 40 mg by mouth daily. folic acid (FOLVITE) 1 mg tablet take 1 mg by mouth daily. levothyroxine (SYNTHROID) 150 mcg tablet Take 175 mcg by mouth daily. traMADoL (ULTRAM) 50 mg tablet Take 50 mg by mouth every six (6) hours as needed for Pain. methotrexate (RHEUMATREX) 2.5 mg tablet Take 2.5 mg by mouth daily. On Tues and Wed       PREMARIN 0.625 mg Tab take 0.625 mg by mouth daily. potassium chloride SA (K-DUR, KLOR-CON M20) 20 mEq tablet Take 20 mEq by mouth daily. Medications Discontinued During This Encounter   Medication Reason    sotaloL (BETAPACE) tablet 120 mg DOSE ADJUSTMENT    sotaloL (BETAPACE) tablet 120 mg          Follow Up Orders:   Follow-up Appointments   Procedures    FOLLOW UP VISIT Appointment in: One Week     Standing Status:   Standing     Number of Occurrences:   1     Order Specific Question:   Appointment in     Answer: One Week         Follow-up Information     Follow up With Specialties Details Why Contact Info    Jimena Edgar MD Internal Medicine In 1 week  [address] [address]  [phone]      Dr. Mendiola Smaller   2 to 4 weeks to evaluate need for repeat colonoscopy             Discharge Exam:    Patient Vitals for the past 24 hrs:   Temp Pulse Resp BP SpO2   07/04/21 1057 97.6 °F (36.4 °C) 75 18 135/72 95 %   07/04/21 0701 98.3 °F (36.8 °C) 75 18 (!) 151/78 94 %   07/04/21 0500 98.3 °F (36.8 °C) 72 18 (!) 147/62 95 %   07/04/21 0037 97.8 °F (36.6 °C) 70 18 (!) 147/75 93 %   07/03/21 2053 98.6 °F (37 °C) 76 18 (!) 149/78 93 %   07/03/21 1848 98.1 °F (36.7 °C) 76 18 128/72 92 %   07/03/21 1638 99.1 °F (37.3 °C) 75 18 134/78 94 %     Oxygen Therapy  O2 Sat (%): 95 % (07/04/21 1057)  Pulse via Oximetry: 75 beats per minute (07/01/21 2001)  O2 Device: None (Room air) (07/04/21 0730)    Estimated body mass index is 27.04 kg/m² as calculated from the following:    Height as of this encounter: 5' 4.5\" (1.638 m). Weight as of this encounter: 72.6 kg (160 lb). Intake/Output Summary (Last 24 hours) at 7/4/2021 1230  Last data filed at 7/3/2021 1232  Gross per 24 hour   Intake 120 ml   Output    Net 120 ml       *Note that automatically entered I/Os may not be accurate; dependent on patient compliance with collection and accurate  by assistants.     Physical Exam:   General:  No acute distress, speaking in full sentences, no use of accessory muscles   HEENT:  Pupils equal and reactive to light and accommodation, oropharynx is clear   Neck:   Supple, no lymphadenopathy, no JVD   Lungs:  Clear to auscultation bilaterally   CV:  Regular rate and rhythm with normal S1 and S2   Abdomen: Soft, nontender, nondistended, normoactive bowel sounds   Extremities:  No cyanosis clubbing or edema   Neuro:  Nonfocal, A&O x3   Psych:  Normal affect       All Labs from Last 24 Hrs:  Recent Results (from the past 24 hour(s))   PROTHROMBIN TIME + INR    Collection Time: 07/04/21  4:02 AM   Result Value Ref Range    Prothrombin time 24.7 (H) 12.5 - 14.7 sec    INR 2.2     CBC WITH AUTOMATED DIFF    Collection Time: 07/04/21  4:02 AM   Result Value Ref Range    WBC 8.4 4.3 - 11.1 K/uL    RBC 3.77 (L) 4.05 - 5.2 M/uL    HGB 10.6 (L) 11.7 - 15.4 g/dL    HCT 33.7 (L) 35.8 - 46.3 %    MCV 89.4 79.6 - 97.8 FL    MCH 28.1 26.1 - 32.9 PG    MCHC 31.5 31.4 - 35.0 g/dL    RDW 16.2 (H) 11.9 - 14.6 %    PLATELET 826 (L) 391 - 450 K/uL    MPV 10.1 9.4 - 12.3 FL    ABSOLUTE NRBC 0.00 0.0 - 0.2 K/uL    DF AUTOMATED      NEUTROPHILS 64 43 - 78 %    LYMPHOCYTES 16 13 - 44 %    MONOCYTES 13 (H) 4.0 - 12.0 %    EOSINOPHILS 5 0.5 - 7.8 %    BASOPHILS 1 0.0 - 2.0 %    IMMATURE GRANULOCYTES 1 0.0 - 5.0 %    ABS. NEUTROPHILS 5.4 1.7 - 8.2 K/UL    ABS. LYMPHOCYTES 1.4 0.5 - 4.6 K/UL    ABS. MONOCYTES 1.1 0.1 - 1.3 K/UL    ABS. EOSINOPHILS 0.4 0.0 - 0.8 K/UL    ABS. BASOPHILS 0.1 0.0 - 0.2 K/UL    ABS. IMM.  GRANS. 0.1 0.0 - 0.5 K/UL       All Micro Results     Procedure Component Value Units Date/Time    CULTURE, BLOOD [[id number]] Collected: 07/01/21 1949    Order Status: Completed Specimen: Blood Updated: 07/04/21 0629     Special Requests: --        LEFT  Antecubital       Culture result: NO GROWTH 3 DAYS       CULTURE, BLOOD [[id number]] Collected: 07/01/21 2229    Order Status: Completed Specimen: Blood Updated: 07/04/21 0629     Special Requests: --        RIGHT  HAND       Culture result: NO GROWTH 3 DAYS             SARS-CoV-2 Lab Results  \"Novel Coronavirus\" Test: No results found for: COV2NT   \"Emergent Disease\" Test: No results found for: EDPR  \"SARS-COV-2\" Test: No results found for: XGCOVT  Rapid Test:   No results found for: COVR         Diagnostic Imaging/Tests:   XR CHEST SNGL V    Result Date: 7/4/2021  NO ACUTE CARDIOPULMONARY DISEASE IDENTIFIED. CT ABD PELV W CONT    Addendum Date: 7/3/2021    Addendum: ADDENDUM, July 3, 2021: I was asked to review the examination by the hospitalist in Dr. Emily Boyd absence. There was apparently a digital transcriptional error in the first IMPRESSION, which should read instead: \"Sigmoid diverticulosis. \"     Result Date: 7/3/2021  Sigmoid diverticulitis. Mural calcifications of the left atrium of uncertain etiology. She has had a prior ablation Cholecystectomy. Status post resection of a duplicated right kidney. Date of Dictation: 7/1/2021 5:55 PM         Echocardiogram/EKG results:  No results found for this visit on 07/01/21.     EKG Results     Procedure 720 Value Units Date/Time    EKG, 12 LEAD, INITIAL [[id number]] Collected: 07/01/21 2319    Order Status: Completed Updated: 07/02/21 0659     Ventricular Rate 70 BPM      Atrial Rate 70 BPM      P-R Interval 174 ms      QRS Duration 154 ms      Q-T Interval 464 ms      QTC Calculation (Bezet) 501 ms      Calculated P Axis 50 degrees      Calculated R Axis -8 degrees      Calculated T Axis 118 degrees      Diagnosis --     AV dual-paced rhythm  Abnormal ECG  When compared with ECG of 28-AUG-2019 09:42,  No significant change was found  Confirmed by Lani Zhao ([id number]) on 7/2/2021 6:59:50 AM            Results for orders placed or performed during the hospital encounter of 07/01/21   EKG, 12 LEAD, INITIAL   Result Value Ref Range    Ventricular Rate 70 BPM    Atrial Rate 70 BPM    P-R Interval 174 ms    QRS Duration 154 ms    Q-T Interval 464 ms    QTC Calculation (Bezet) 501 ms    Calculated P Axis 50 degrees    Calculated R Axis -8 degrees    Calculated T Axis 118 degrees    Diagnosis       AV dual-paced rhythm  Abnormal ECG  When compared with ECG of 28-AUG-2019 09:42,  No significant change was found  Confirmed by Lani Zhao ([id number]) on 7/2/2021 6:59:50 AM         Procedures done this admission:  * No surgery found *        Time spent in patient discharge planning and coordination 39 minutes.       Signed By: Raul Edmonds MD   [facility]    July 4, 2021  12:30 PM

## 2021-07-04 NOTE — PROGRESS NOTES
Patient is up for discharge. Patient will be getting discharged homed with no supportive care needs at this time.      Care Management Interventions  Transition of Care Consult (CM Consult): Discharge Planning  Discharge Durable Medical Equipment: No  Physical Therapy Consult: No  Occupational Therapy Consult: No  Speech Therapy Consult: No  Discharge Location  Discharge Placement: Home

## 2021-07-06 LAB
BACTERIA SPEC CULT: NORMAL
BACTERIA SPEC CULT: NORMAL
SERVICE CMNT-IMP: NORMAL
SERVICE CMNT-IMP: NORMAL

## 2021-10-13 ENCOUNTER — TRANSCRIBE ORDER (OUTPATIENT)
Dept: SCHEDULING | Age: 81
End: 2021-10-13

## 2021-10-13 DIAGNOSIS — Z12.31 ENCOUNTER FOR SCREENING MAMMOGRAM FOR MALIGNANT NEOPLASM OF BREAST: Primary | ICD-10-CM

## 2021-11-12 ENCOUNTER — HOSPITAL ENCOUNTER (OUTPATIENT)
Dept: MAMMOGRAPHY | Age: 81
Discharge: HOME | End: 2021-11-12
Attending: INTERNAL MEDICINE
Payer: MEDICARE

## 2021-11-12 DIAGNOSIS — Z12.31 ENCOUNTER FOR SCREENING MAMMOGRAM FOR MALIGNANT NEOPLASM OF BREAST: ICD-10-CM

## 2021-11-12 PROCEDURE — 77063 BREAST TOMOSYNTHESIS BI: CPT

## 2022-03-18 PROBLEM — R65.10 SIRS (SYSTEMIC INFLAMMATORY RESPONSE SYNDROME) (HCC): Status: ACTIVE | Noted: 2021-07-03

## 2022-03-18 PROBLEM — Z79.01 LONG TERM (CURRENT) USE OF ANTICOAGULANTS: Status: ACTIVE | Noted: 2018-05-08

## 2022-03-19 PROBLEM — K57.92 DIVERTICULITIS: Status: ACTIVE | Noted: 2021-07-01

## 2022-03-19 PROBLEM — A41.9 SEPSIS (HCC): Status: ACTIVE | Noted: 2021-07-03

## 2022-03-20 PROBLEM — E78.5 DYSLIPIDEMIA: Status: ACTIVE | Noted: 2019-08-28

## 2022-05-09 ENCOUNTER — HOSPITAL ENCOUNTER (OUTPATIENT)
Dept: LAB | Age: 82
Discharge: HOME | End: 2022-05-09
Payer: MEDICARE

## 2022-05-09 DIAGNOSIS — I50.32 CHRONIC DIASTOLIC CONGESTIVE HEART FAILURE (HCC): ICD-10-CM

## 2022-05-09 LAB
ANION GAP SERPL CALC-SCNC: 2 MMOL/L (ref 7–16)
BNP SERPL-MCNC: 384 PG/ML
BUN SERPL-MCNC: 16 MG/DL (ref 8–23)
CALCIUM SERPL-MCNC: 8.9 MG/DL (ref 8.3–10.4)
CHLORIDE SERPL-SCNC: 106 MMOL/L (ref 98–107)
CO2 SERPL-SCNC: 34 MMOL/L (ref 21–32)
CREAT SERPL-MCNC: 1 MG/DL (ref 0.6–1)
GLUCOSE SERPL-MCNC: 105 MG/DL (ref 65–100)
POTASSIUM SERPL-SCNC: 3.6 MMOL/L (ref 3.5–5.1)
SODIUM SERPL-SCNC: 142 MMOL/L (ref 136–145)

## 2022-05-09 PROCEDURE — 80048 BASIC METABOLIC PNL TOTAL CA: CPT

## 2022-05-09 PROCEDURE — 83880 ASSAY OF NATRIURETIC PEPTIDE: CPT

## 2022-05-09 PROCEDURE — 36415 COLL VENOUS BLD VENIPUNCTURE: CPT

## 2022-05-23 ENCOUNTER — ANTI-COAG VISIT (OUTPATIENT)
Dept: CARDIOLOGY CLINIC | Age: 82
End: 2022-05-23
Payer: MEDICARE

## 2022-05-23 DIAGNOSIS — Z79.01 LONG TERM (CURRENT) USE OF ANTICOAGULANTS: ICD-10-CM

## 2022-05-23 DIAGNOSIS — I48.0 PAROXYSMAL ATRIAL FIBRILLATION (HCC): Primary | ICD-10-CM

## 2022-05-23 LAB
POC INR: 2.8
PROTHROMBIN TIME, POC: NORMAL
VALID INTERNAL CONTROL, POC: YES

## 2022-05-23 PROCEDURE — 85610 PROTHROMBIN TIME: CPT | Performed by: INTERNAL MEDICINE

## 2022-05-23 PROCEDURE — 93793 ANTICOAG MGMT PT WARFARIN: CPT | Performed by: INTERNAL MEDICINE

## 2022-05-23 NOTE — PROGRESS NOTES
Anticoagulation Summary  As of 2022    INR goal:  2.0-3.0   TTR:  --   INR used for dosin.8 (2022)   Warfarin maintenance plan:  5 mg (5 mg x 1) every Mon; 2.5 mg (5 mg x 0.5) all other days   Weekly warfarin total:  20 mg   Plan last modified:  [address] [place] (2022)   Next INR check:  2022   Priority:  Maintenance   Target end date: Indefinite    Indications    Long term (current) use of anticoagulants [Z79.01]  Paroxysmal atrial fibrillation ([facility] [I48.0]             Anticoagulation Episode Summary     INR check location:  Anticoagulation Clinic    Preferred lab:      Send INR reminders to:  [id number] [place], [place] [address]y. [address]    [place]  [facility]      Anticoagulation Care Providers     Provider Role Specialty Phone number    Reyna Galicia MD Responsible Cardiology [phone]      Tablet strength and weekly dosing schedule confirmed today.

## 2022-06-15 ENCOUNTER — TELEPHONE (OUTPATIENT)
Dept: CARDIOLOGY CLINIC | Age: 82
End: 2022-06-15

## 2022-06-15 NOTE — TELEPHONE ENCOUNTER
I spoke to Trever Hernandez in the [facility] office. She will get samples of Livalo 4mg ready for the pt.      Pt notified

## 2022-06-20 ENCOUNTER — ANTI-COAG VISIT (OUTPATIENT)
Dept: CARDIOLOGY CLINIC | Age: 82
End: 2022-06-20
Payer: MEDICARE

## 2022-06-20 DIAGNOSIS — Z79.01 LONG TERM (CURRENT) USE OF ANTICOAGULANTS: Primary | ICD-10-CM

## 2022-06-20 DIAGNOSIS — I48.0 PAROXYSMAL ATRIAL FIBRILLATION (HCC): ICD-10-CM

## 2022-06-20 LAB
POC INR: 2.3
PROTHROMBIN TIME, POC: NORMAL
VALID INTERNAL CONTROL, POC: NORMAL

## 2022-06-20 PROCEDURE — 93793 ANTICOAG MGMT PT WARFARIN: CPT | Performed by: INTERNAL MEDICINE

## 2022-06-20 PROCEDURE — 85610 PROTHROMBIN TIME: CPT | Performed by: INTERNAL MEDICINE

## 2022-06-20 NOTE — PROGRESS NOTES
Anticoagulation Summary  As of 2022    INR goal:  2.0-3.0   TTR:  100.0 % (2.6 wk)   INR used for dosin.3 (2022)   Warfarin maintenance plan:  5 mg (5 mg x 1) every Mon; 2.5 mg (5 mg x 0.5) all other days   Weekly warfarin total:  20 mg   Plan last modified:  [address] [facility] [place] (2022)   Next INR check:  2022   Priority:  Maintenance   Target end date: Indefinite    Indications    Long term (current) use of anticoagulants [Z79.01]  Paroxysmal atrial fibrillation ([facility] [I48.0]             Anticoagulation Episode Summary     INR check location:  Anticoagulation Clinic    Preferred lab:      Send INR reminders to:  [id number] [place], [place] [address]y. [address]    Comments:  [facility] [place]      Anticoagulation Care Providers     Provider Role Specialty Phone number    Reyna Galicia MD Responsible Cardiology [phone]          Tablet strength and weekly dosing schedule confirmed today.

## 2022-06-20 NOTE — PATIENT INSTRUCTIONS
Reminder: Please contact the Coumadin Clinic at [phone] when you have medication changes. Examples, new medications, antibiotics, discontinued medications, new supplements, missed doses of warfarin or if you took extra doses of warfarin. This also includes OTC medications. Notifying us helps reduce the possibility of high and low INR's. In addition, if warfarin needs to be held for any procedures, please have surgeon or physician's office contact us before holding anticoagulant. Thanks, [facility] Coumadin [facility].

## 2022-06-22 ENCOUNTER — OFFICE VISIT (OUTPATIENT)
Dept: CARDIOLOGY CLINIC | Age: 82
End: 2022-06-22
Payer: MEDICARE

## 2022-06-22 VITALS
BODY MASS INDEX: 26.33 KG/M2 | HEART RATE: 80 BPM | SYSTOLIC BLOOD PRESSURE: 140 MMHG | HEIGHT: 65 IN | WEIGHT: 158 LBS | DIASTOLIC BLOOD PRESSURE: 60 MMHG

## 2022-06-22 DIAGNOSIS — I10 PRIMARY HYPERTENSION: ICD-10-CM

## 2022-06-22 DIAGNOSIS — R20.8 ELECTRICAL SHOCK SENSATION: ICD-10-CM

## 2022-06-22 DIAGNOSIS — I50.812 CHRONIC RIGHT-SIDED CONGESTIVE HEART FAILURE (HCC): ICD-10-CM

## 2022-06-22 DIAGNOSIS — Z95.0 PACEMAKER: Primary | ICD-10-CM

## 2022-06-22 PROCEDURE — 99214 OFFICE O/P EST MOD 30 MIN: CPT | Performed by: INTERNAL MEDICINE

## 2022-06-22 PROCEDURE — 1036F TOBACCO NON-USER: CPT | Performed by: INTERNAL MEDICINE

## 2022-06-22 PROCEDURE — G8427 DOCREV CUR MEDS BY ELIG CLIN: HCPCS | Performed by: INTERNAL MEDICINE

## 2022-06-22 PROCEDURE — 1123F ACP DISCUSS/DSCN MKR DOCD: CPT | Performed by: INTERNAL MEDICINE

## 2022-06-22 PROCEDURE — G8400 PT W/DXA NO RESULTS DOC: HCPCS | Performed by: INTERNAL MEDICINE

## 2022-06-22 PROCEDURE — 1090F PRES/ABSN URINE INCON ASSESS: CPT | Performed by: INTERNAL MEDICINE

## 2022-06-22 PROCEDURE — G8417 CALC BMI ABV UP PARAM F/U: HCPCS | Performed by: INTERNAL MEDICINE

## 2022-06-22 RX ORDER — ONDANSETRON 4 MG/1
4 TABLET, ORALLY DISINTEGRATING ORAL 3 TIMES DAILY PRN
COMMUNITY
Start: 2022-06-14

## 2022-06-22 NOTE — PROGRESS NOTES
[facility] CARDIOLOGY  [address], [address]  PHONE: [phone]    NAME: Nae Omalley  : 1940     HARINI Gorman is a 78 y.o. female seen for a follow up visit regarding   Hypertension (Had Echo), Coronary Artery Disease, and Atrial Fibrillation    She is here in fu on her diastolic CHF. She continues to have sensations where she feels an electrical all over and has felt terrible since the episode on Monday. She feels a charge from mid chest to waist top of head and out fingers. She feels strongly that it has to be eminating from her pacemaker with the sensation       Past Medical History, Past Surgical History, Family history, Social History, and Medications were all reviewed with the patient today and updated as necessary. [unfilled]  Allergies   Allergen Reactions    Atorvastatin Other (See Comments)    Codeine Nausea Only    Hydromorphone Other (See Comments)    Morphine Other (See Comments)    Simvastatin Other (See Comments)    Sulfa Antibiotics Swelling     Past Medical History:   Diagnosis Date    Accelerated hypertension 2014    Aortic valve insufficiency 2014    Arrhythmia     at fib    Arthritis     Atrial fibrillation ([facility] 2014    CAD (coronary artery disease)     Cancer (HCC)     basal    Congestive heart failure (CHF) ([address] 10/29/2015    Coronary atherosclerosis 10/29/2015    GERD (gastroesophageal reflux disease)     Hypertension     Hypokalemia 2014    Nausea & vomiting     Pacemaker 2014    Thyroid disease     Unstable angina ([address] 2014     Past Surgical History:   Procedure Laterality Date    BREAST BIOPSY Left     Lt breast approx. 25 yrs ago.     GYN      total hysterectomy    OTHER SURGICAL HISTORY      nephrectomy  3rd kidney    PACEMAKER      AK ABDOMEN SURGERY PROC UNLISTED      gallbladder    AK CARDIAC SURG PROCEDURE UNLIST      card cath stents,at fib ablation Family History   Problem Relation Age of Onset    Breast Cancer Neg Hx     Heart Disease Mother     Heart Disease Brother     Heart Disease Brother       Social History     Tobacco Use    Smoking status: Former Smoker     Quit date: 10/29/1985     Years since quittin.6    Smokeless tobacco: Never Used   Substance Use Topics    Alcohol use: No     Prior to Admission medications    Medication Sig Start Date End Date Taking?  Authorizing Provider   empagliflozin (JARDIANCE) 10 MG tablet Take 10 mg by mouth daily 22  Yes Historical Provider, MD   ondansetron (ZOFRAN-ODT) 4 MG disintegrating tablet Take 4 mg by mouth 3 times daily as needed 22  Yes Historical Provider, MD   acetaminophen (TYLENOL) 500 MG tablet Take by mouth every 6 hours as needed   Yes Ar Automatic Reconciliation   Cholecalciferol 50 MCG (2000 UT) CAPS Take 2,000 Int'l Units by mouth daily 7/20/10  Yes Ar Automatic Reconciliation   clopidogrel (PLAVIX) 75 MG tablet Take 75 mg by mouth daily 3/8/22  Yes Ar Automatic Reconciliation   ezetimibe (ZETIA) 10 MG tablet Take 10 mg by mouth daily   Yes Ar Automatic Reconciliation   famotidine (PEPCID) 20 MG tablet Take 20 mg by mouth   Yes Ar Automatic Reconciliation   fexofenadine (ALLEGRA) 180 MG tablet Take 180 mg by mouth daily as needed   Yes Ar Automatic Reconciliation   fluticasone (VERAMYST) 27.5 MCG/SPRAY nasal spray 2 sprays by Nasal route as needed    Yes Ar Automatic Reconciliation   folic acid (FOLVITE) 1 MG tablet Take 1 mg by mouth daily   Yes Ar Automatic Reconciliation   furosemide (LASIX) 40 MG tablet As needed 21  Yes Ar Automatic Reconciliation   halobetasol (ULTRAVATE) 0.05 % cream Apply topically 2 times daily   Yes Ar Automatic Reconciliation   levothyroxine (SYNTHROID) 150 MCG tablet Take 175 mcg by mouth daily   Yes Ar Automatic Reconciliation   losartan-hydroCHLOROthiazide (HYZAAR) 50-12.5 MG per tablet Take 1 tablet by mouth daily   Yes Ar Automatic Reconciliation   nitroGLYCERIN (NITROSTAT) 0.4 MG SL tablet Take 0.4 mg by mouth 2/25/21  Yes Ar Automatic Reconciliation   pantoprazole (PROTONIX) 40 MG tablet Take 40 mg by mouth daily 6/29/21  Yes Ar Automatic Reconciliation   pitavastatin (LIVALO) 4 MG TABS tablet Take by mouth daily   Yes Ar Automatic Reconciliation   potassium chloride (KLOR-CON M) 20 MEQ extended release tablet Take 20 mEq by mouth daily   Yes Ar Automatic Reconciliation   sotalol (BETAPACE) 120 MG tablet Take 120 mg by mouth 2 times daily 2/25/21  Yes Ar Automatic Reconciliation   warfarin (COUMADIN) 5 MG tablet 1/2-1 tab po daily or as directed. 2/25/21  Yes Ar Automatic Reconciliation         [unfilled]              Wt Readings from Last 3 Encounters:   06/22/22 158 lb (71.7 kg)   06/20/22 163 lb (73.9 kg)   05/09/22 163 lb (73.9 kg)     BP Readings from Last 3 Encounters:   06/22/22 (!) 140/60   06/20/22 (!) 146/76   05/09/22 110/86       BP (!) 140/60   Pulse 80   Ht 5' 4.5\" (1.638 m)   Wt 158 lb (71.7 kg)   BMI 26.70 kg/m²       Physical Exam  Constitutional:       Appearance: Normal appearance. Cardiovascular:      Rate and Rhythm: Normal rate and regular rhythm. Heart sounds: Normal heart sounds. Pulmonary:      Breath sounds: Normal breath sounds. Abdominal:      Palpations: Abdomen is soft. Skin:     General: Skin is warm and dry. Neurological:      Mental Status: She is alert and oriented to person, place, and time. Mental status is at baseline. EKG-    Medical problems and test results were reviewed with the patient today. No results found for any visits on 06/22/22.       No results found for: HBA1C, [id number]    Lab Results   Component Value Date    WBC 8.4 07/04/2021    HGB 10.6 07/04/2021    HCT 33.7 07/04/2021     07/04/2021    MCV 89.4 07/04/2021       Lab Results   Component Value Date     05/09/2022    K 3.6 05/09/2022     05/09/2022    CO2 34 05/09/2022    BUN 16 05/09/2022 GFRAA >60 05/09/2022       Lab Results   Component Value Date    ALT 27 07/02/2021       No results found for: CHOL, CHOLPOCT, CHOLX, CHLST, CHOLV, HDL, HDLPOC, HDLC, LDL, LDLC, VLDLC, VLDL, TGLX, TRIGL      ASSESSMENT and PLAN    Georgie Carlson was seen today for hypertension, coronary artery disease and atrial fibrillation.     Diagnoses and all orders for this visit:    Chronic right-sided congestive heart failure (HCC) diastolic with edema  improved with addition of Jardiance EF normal      Primary hypertension  reasonable on Hyzaar 50-12.5 mg a day     Pacemaker  electrical sensations along with feeling poorly requests pacer check with Dr Nelly Epps  which is reasonable                               Vangie Sellers MD  6/22/2022  2:53 PM

## 2022-07-15 ENCOUNTER — TELEPHONE (OUTPATIENT)
Dept: CARDIOLOGY CLINIC | Age: 82
End: 2022-07-15

## 2022-07-15 NOTE — TELEPHONE ENCOUNTER
Wants samples of livalo 4 mg takes 1 daily.   Would like them to pickup in [address] [place] [address] office on Monday when here to get PT

## 2022-07-18 ENCOUNTER — ANTI-COAG VISIT (OUTPATIENT)
Dept: CARDIOLOGY CLINIC | Age: 82
End: 2022-07-18
Payer: MEDICARE

## 2022-07-18 DIAGNOSIS — Z79.01 LONG TERM (CURRENT) USE OF ANTICOAGULANTS: Primary | ICD-10-CM

## 2022-07-18 DIAGNOSIS — I48.0 PAROXYSMAL ATRIAL FIBRILLATION (HCC): ICD-10-CM

## 2022-07-18 LAB
POC INR: 4.4
PROTHROMBIN TIME, POC: ABNORMAL
VALID INTERNAL CONTROL, POC: ABNORMAL

## 2022-07-18 PROCEDURE — 85610 PROTHROMBIN TIME: CPT | Performed by: INTERNAL MEDICINE

## 2022-07-18 PROCEDURE — 93793 ANTICOAG MGMT PT WARFARIN: CPT | Performed by: INTERNAL MEDICINE

## 2022-07-18 NOTE — PROGRESS NOTES
Tablet strength and weekly dosing schedule confirmed today. Pt has been having issues with her shoulders and has been taking tylenol and using biofreeze. She will have a Cortizone shot tomorrow in both shoulders. Will hold two days.

## 2022-07-18 NOTE — PATIENT INSTRUCTIONS
Reminder: Please contact the [facility] at [phone] when you have medication changes. Examples, new medications, antibiotics, discontinued medications, new supplements, missed doses of warfarin or if you took extra doses of warfarin. This also includes OTC medications. Notifying us helps reduce the possibility of high and low INR's. In addition, if warfarin needs to be held for any procedures, please have surgeon or physician's office contact us before holding anticoagulant. Thanks, [facility] Cardiology Coumadin Clinic.

## 2022-08-12 ENCOUNTER — TELEPHONE (OUTPATIENT)
Dept: CARDIOLOGY CLINIC | Age: 82
End: 2022-08-12

## 2022-08-12 NOTE — TELEPHONE ENCOUNTER
Drug Samples Documentation:    Drug: Livalo  Strength: 4mg   Quantity: 4 boxes  Expiration Date: 05/2024  Lot Latrice Sandhoff

## 2022-08-12 NOTE — TELEPHONE ENCOUNTER
Patient requesting to  samples of LIVALO 4MG when she comes for her protime appt on Monday in [place]. Please call.

## 2022-08-15 ENCOUNTER — ANTI-COAG VISIT (OUTPATIENT)
Dept: CARDIOLOGY CLINIC | Age: 82
End: 2022-08-15
Payer: MEDICARE

## 2022-08-15 DIAGNOSIS — I48.0 PAROXYSMAL ATRIAL FIBRILLATION (HCC): ICD-10-CM

## 2022-08-15 DIAGNOSIS — Z79.01 LONG TERM (CURRENT) USE OF ANTICOAGULANTS: ICD-10-CM

## 2022-08-15 LAB
POC INR: 1.7
PROTHROMBIN TIME, POC: ABNORMAL

## 2022-08-15 PROCEDURE — 85610 PROTHROMBIN TIME: CPT | Performed by: INTERNAL MEDICINE

## 2022-08-15 PROCEDURE — 93793 ANTICOAG MGMT PT WARFARIN: CPT | Performed by: INTERNAL MEDICINE

## 2022-08-15 NOTE — PATIENT INSTRUCTIONS
Reminder: Please contact the Coumadin Clinic at [phone] when you have medication changes. Examples, new medications, antibiotics, discontinued medications, new supplements, missed doses of warfarin or if you took extra doses of warfarin. This also includes OTC medications. Notifying us helps reduce the possibility of high and low INR's. In addition, if warfarin needs to be held for any procedures, please have surgeon or physician's office contact us before holding anticoagulant. Thanks, [facility] Cardiology Coumadin Clinic.

## 2022-08-15 NOTE — PROGRESS NOTES
Tablet strength and weekly dosing schedule confirmed today. One time adjustment to whole tablet today. Return in two weeks.

## 2022-08-24 PROCEDURE — 93296 REM INTERROG EVL PM/IDS: CPT | Performed by: INTERNAL MEDICINE

## 2022-08-24 PROCEDURE — 93294 REM INTERROG EVL PM/LDLS PM: CPT | Performed by: INTERNAL MEDICINE

## 2022-08-29 ENCOUNTER — ANTI-COAG VISIT (OUTPATIENT)
Dept: CARDIOLOGY CLINIC | Age: 82
End: 2022-08-29
Payer: MEDICARE

## 2022-08-29 DIAGNOSIS — I48.0 PAROXYSMAL ATRIAL FIBRILLATION (HCC): ICD-10-CM

## 2022-08-29 DIAGNOSIS — Z79.01 LONG TERM (CURRENT) USE OF ANTICOAGULANTS: Primary | ICD-10-CM

## 2022-08-29 LAB
POC INR: 2.1
PROTHROMBIN TIME, POC: NORMAL

## 2022-08-29 PROCEDURE — 93793 ANTICOAG MGMT PT WARFARIN: CPT | Performed by: INTERNAL MEDICINE

## 2022-08-29 PROCEDURE — 85610 PROTHROMBIN TIME: CPT | Performed by: INTERNAL MEDICINE

## 2022-08-29 NOTE — PATIENT INSTRUCTIONS
Reminder: Please contact the Coumadin Clinic at [phone] when you have medication changes. Examples, new medications, antibiotics, discontinued medications, new supplements, missed doses of warfarin or if you took extra doses of warfarin. This also includes OTC medications. Notifying us helps reduce the possibility of high and low INR's. In addition, if warfarin needs to be held for any procedures, please have surgeon or physician's office contact us before holding anticoagulant. Thanks, [address] Cardiology Coumadin Clinic.

## 2022-09-06 ENCOUNTER — ANTI-COAG VISIT (OUTPATIENT)
Dept: CARDIOLOGY CLINIC | Age: 82
End: 2022-09-06

## 2022-09-06 DIAGNOSIS — Z79.01 LONG TERM (CURRENT) USE OF ANTICOAGULANTS: Primary | ICD-10-CM

## 2022-09-06 DIAGNOSIS — I48.0 PAROXYSMAL ATRIAL FIBRILLATION (HCC): ICD-10-CM

## 2022-09-08 ENCOUNTER — TELEPHONE (OUTPATIENT)
Dept: CARDIOLOGY CLINIC | Age: 82
End: 2022-09-08

## 2022-09-09 ENCOUNTER — ANTI-COAG VISIT (OUTPATIENT)
Dept: CARDIOLOGY CLINIC | Age: 82
End: 2022-09-09
Payer: MEDICARE

## 2022-09-09 DIAGNOSIS — Z95.0 PACEMAKER: Primary | ICD-10-CM

## 2022-09-09 DIAGNOSIS — I48.0 PAROXYSMAL ATRIAL FIBRILLATION (HCC): ICD-10-CM

## 2022-09-09 DIAGNOSIS — Z79.01 LONG TERM (CURRENT) USE OF ANTICOAGULANTS: Primary | ICD-10-CM

## 2022-09-09 DIAGNOSIS — Z95.0 PACEMAKER: ICD-10-CM

## 2022-09-09 LAB
POC INR: 1.7
PROTHROMBIN TIME, POC: ABNORMAL

## 2022-09-09 PROCEDURE — 93793 ANTICOAG MGMT PT WARFARIN: CPT | Performed by: INTERNAL MEDICINE

## 2022-09-09 PROCEDURE — 85610 PROTHROMBIN TIME: CPT | Performed by: INTERNAL MEDICINE

## 2022-09-09 NOTE — PATIENT INSTRUCTIONS
Reminder: Please contact the Coumadin Clinic at [phone] when you have medication changes. Examples, new medications, antibiotics, discontinued medications, new supplements, missed doses of warfarin or if you took extra doses of warfarin. This also includes OTC medications. Notifying us helps reduce the possibility of high and low INR's. In addition, if warfarin needs to be held for any procedures, please have surgeon or physician's office contact us before holding anticoagulant. Thanks, [facility] Cardiology Coumadin Clinic.

## 2022-09-27 ENCOUNTER — ANTI-COAG VISIT (OUTPATIENT)
Dept: CARDIOLOGY CLINIC | Age: 82
End: 2022-09-27
Payer: MEDICARE

## 2022-09-27 DIAGNOSIS — Z79.01 LONG TERM (CURRENT) USE OF ANTICOAGULANTS: Primary | ICD-10-CM

## 2022-09-27 DIAGNOSIS — I48.0 PAROXYSMAL ATRIAL FIBRILLATION (HCC): ICD-10-CM

## 2022-09-27 LAB
POC INR: 1.7
PROTHROMBIN TIME, POC: ABNORMAL

## 2022-09-27 PROCEDURE — 85610 PROTHROMBIN TIME: CPT | Performed by: INTERNAL MEDICINE

## 2022-09-27 PROCEDURE — 93793 ANTICOAG MGMT PT WARFARIN: CPT | Performed by: INTERNAL MEDICINE

## 2022-09-27 NOTE — PATIENT INSTRUCTIONS
Reminder: Please contact the Coumadin Clinic at [phone] when you have medication changes. Examples, new medications, antibiotics, discontinued medications, new supplements, missed doses of warfarin or if you took extra doses of warfarin. This also includes OTC medications. Notifying us helps reduce the possibility of high and low INR's. In addition, if warfarin needs to be held for any procedures, please have surgeon or physician's office contact us before holding anticoagulant. Thanks, [facility] Cardiology Coumadin Clinic.

## 2022-09-27 NOTE — PROGRESS NOTES
Tablet strength and weekly dosing schedule confirmed today. Patient knows of no reason for subtherapeutic INR result.

## 2022-10-10 ENCOUNTER — TELEPHONE (OUTPATIENT)
Dept: CARDIOLOGY CLINIC | Age: 82
End: 2022-10-10

## 2022-10-10 NOTE — TELEPHONE ENCOUNTER
I spoke to Messi Arana in the [place] office. She will put samples of Livalo 4mg at the  for the pt. Last Lipid 9/19/22 at [facility]. Pt notified that samples will be ready in the [place] office.

## 2022-10-11 ENCOUNTER — ANTI-COAG VISIT (OUTPATIENT)
Dept: CARDIOLOGY CLINIC | Age: 82
End: 2022-10-11
Payer: MEDICARE

## 2022-10-11 DIAGNOSIS — I48.0 PAROXYSMAL ATRIAL FIBRILLATION (HCC): Primary | ICD-10-CM

## 2022-10-11 DIAGNOSIS — Z79.01 LONG TERM (CURRENT) USE OF ANTICOAGULANTS: ICD-10-CM

## 2022-10-11 LAB
POC INR: 2
PROTHROMBIN TIME, POC: NORMAL

## 2022-10-11 PROCEDURE — 93793 ANTICOAG MGMT PT WARFARIN: CPT | Performed by: INTERNAL MEDICINE

## 2022-10-11 PROCEDURE — 85610 PROTHROMBIN TIME: CPT | Performed by: INTERNAL MEDICINE

## 2022-10-11 NOTE — PATIENT INSTRUCTIONS
Reminder: Please contact the Coumadin Clinic at [phone] when you have medication changes. Examples, new medications, antibiotics, discontinued medications, new supplements, missed doses of warfarin or if you took extra doses of warfarin. This also includes OTC medications. Notifying us helps reduce the possibility of high and low INR's. In addition, if warfarin needs to be held for any procedures, please have surgeon or physician's office contact us before holding anticoagulant. Thanks, [facility] Cardiology Coumadin Clinic.

## 2022-10-18 ENCOUNTER — TELEPHONE (OUTPATIENT)
Dept: CARDIOLOGY CLINIC | Age: 82
End: 2022-10-18

## 2022-10-18 NOTE — TELEPHONE ENCOUNTER
Cardiac Clearance        Physician or Practice Requesting:Dr. Don Lee  : Kurt Villegas Phone Number: [phone]  Fax Number: [phone]  Date of Surgery/Procedure: November 8th  Type of Surgery or Procedure: colonoscopy   Type of Anesthesia: MAC  Type of Clearance Requested: Medication Hold Only  Medication to Hold:coumadin   Days to Hold: 5

## 2022-10-22 NOTE — TELEPHONE ENCOUNTER
The patient a low to moderate CV risk for a low risk surgery/procedure. Hold the blood thinner for 3-5 days and restart when able.      KIRT

## 2022-10-25 RX ORDER — WARFARIN SODIUM 5 MG/1
TABLET ORAL
Qty: 90 TABLET | Refills: 3 | Status: SHIPPED | OUTPATIENT
Start: 2022-10-25

## 2022-10-25 NOTE — TELEPHONE ENCOUNTER
MEDICATION REFILL REQUEST      Name of Medication:  Warfarin   Dose:  5 mg  Frequency:    Quantity:    Days' supply:        Pharmacy Name/Location:  did not leave

## 2022-10-28 ENCOUNTER — ANTI-COAG VISIT (OUTPATIENT)
Dept: CARDIOLOGY CLINIC | Age: 82
End: 2022-10-28
Payer: MEDICARE

## 2022-10-28 DIAGNOSIS — Z79.01 LONG TERM (CURRENT) USE OF ANTICOAGULANTS: Primary | ICD-10-CM

## 2022-10-28 DIAGNOSIS — I48.0 PAROXYSMAL ATRIAL FIBRILLATION (HCC): ICD-10-CM

## 2022-10-28 LAB
POC INR: 2.1
PROTHROMBIN TIME, POC: NORMAL

## 2022-10-28 PROCEDURE — 93793 ANTICOAG MGMT PT WARFARIN: CPT | Performed by: INTERNAL MEDICINE

## 2022-10-28 PROCEDURE — 85610 PROTHROMBIN TIME: CPT | Performed by: INTERNAL MEDICINE

## 2022-10-28 NOTE — PATIENT INSTRUCTIONS
Reminder: Please contact the Coumadin Clinic at [phone] when you have medication changes. Examples, new medications, antibiotics, discontinued medications, new supplements, missed doses of warfarin or if you took extra doses of warfarin. This also includes OTC medications. Notifying us helps reduce the possibility of high and low INR's. In addition, if warfarin needs to be held for any procedures, please have surgeon or physician's office contact us before holding anticoagulant. Thanks, [facility] Cardiology Coumadin Clinic.

## 2022-11-15 ENCOUNTER — TELEPHONE (OUTPATIENT)
Dept: CARDIOLOGY CLINIC | Age: 82
End: 2022-11-15

## 2022-11-18 PROCEDURE — 93296 REM INTERROG EVL PM/IDS: CPT | Performed by: INTERNAL MEDICINE

## 2022-11-18 PROCEDURE — 93294 REM INTERROG EVL PM/LDLS PM: CPT | Performed by: INTERNAL MEDICINE

## 2022-11-29 ENCOUNTER — OFFICE VISIT (OUTPATIENT)
Dept: CARDIOLOGY CLINIC | Age: 82
End: 2022-11-29
Payer: MEDICARE

## 2022-11-29 ENCOUNTER — ANTI-COAG VISIT (OUTPATIENT)
Dept: CARDIOLOGY CLINIC | Age: 82
End: 2022-11-29
Payer: MEDICARE

## 2022-11-29 VITALS
SYSTOLIC BLOOD PRESSURE: 128 MMHG | DIASTOLIC BLOOD PRESSURE: 70 MMHG | HEART RATE: 76 BPM | BODY MASS INDEX: 25.18 KG/M2 | WEIGHT: 156.7 LBS | HEIGHT: 66 IN

## 2022-11-29 DIAGNOSIS — Z79.01 LONG TERM (CURRENT) USE OF ANTICOAGULANTS: Primary | ICD-10-CM

## 2022-11-29 DIAGNOSIS — I47.1 ATRIAL TACHYCARDIA (HCC): Primary | ICD-10-CM

## 2022-11-29 DIAGNOSIS — I48.0 PAROXYSMAL ATRIAL FIBRILLATION (HCC): ICD-10-CM

## 2022-11-29 LAB
POC INR: 1.9
PROTHROMBIN TIME, POC: ABNORMAL

## 2022-11-29 PROCEDURE — 85610 PROTHROMBIN TIME: CPT | Performed by: INTERNAL MEDICINE

## 2022-11-29 PROCEDURE — 93000 ELECTROCARDIOGRAM COMPLETE: CPT | Performed by: INTERNAL MEDICINE

## 2022-11-29 PROCEDURE — G8428 CUR MEDS NOT DOCUMENT: HCPCS | Performed by: INTERNAL MEDICINE

## 2022-11-29 PROCEDURE — G8484 FLU IMMUNIZE NO ADMIN: HCPCS | Performed by: INTERNAL MEDICINE

## 2022-11-29 PROCEDURE — 1123F ACP DISCUSS/DSCN MKR DOCD: CPT | Performed by: INTERNAL MEDICINE

## 2022-11-29 PROCEDURE — 1036F TOBACCO NON-USER: CPT | Performed by: INTERNAL MEDICINE

## 2022-11-29 PROCEDURE — 99214 OFFICE O/P EST MOD 30 MIN: CPT | Performed by: INTERNAL MEDICINE

## 2022-11-29 PROCEDURE — 3078F DIAST BP <80 MM HG: CPT | Performed by: INTERNAL MEDICINE

## 2022-11-29 PROCEDURE — G8417 CALC BMI ABV UP PARAM F/U: HCPCS | Performed by: INTERNAL MEDICINE

## 2022-11-29 PROCEDURE — 3074F SYST BP LT 130 MM HG: CPT | Performed by: INTERNAL MEDICINE

## 2022-11-29 PROCEDURE — G8400 PT W/DXA NO RESULTS DOC: HCPCS | Performed by: INTERNAL MEDICINE

## 2022-11-29 PROCEDURE — 1090F PRES/ABSN URINE INCON ASSESS: CPT | Performed by: INTERNAL MEDICINE

## 2022-11-29 ASSESSMENT — ENCOUNTER SYMPTOMS
RESPIRATORY NEGATIVE: 1
EYES NEGATIVE: 1
GASTROINTESTINAL NEGATIVE: 1
ALLERGIC/IMMUNOLOGIC NEGATIVE: 1

## 2022-11-29 NOTE — PROGRESS NOTES
[facility] CARDIOLOGY  [address] [place] [address], [address] [facility], [address] [place] [address]  PHONE: [phone]        22      NAME:  Valeria Lynne  : 1940  MRN: [id number]     ASSESSMENT and PLAN:  Diagnoses and all orders for this visit:      1. Paroxysmal atrial fibrillation ([address] s/p AVJ ablation. 2. Essential hypertension      3. Atherosclerosis of native coronary artery of native heart without angina pectoris      4. Atrial tachycardia ([address]      5. Chronic diastolic congestive heart failure ([address]      6. Pacemaker, BTK DCPM      7. Long term (current) use of anticoagulants      8. Dyslipidemia     78year old female with a history of pAF s/p DCPM and AVJ ablation. She comes in for longitudinal EP follow up at the request of Dr. Ham Martin. She does have dyspnea which is likely multifactorial. She is able to feel most functions from her pacemaker. We  did make small adjustments to help with her pacemaker. Feels better with lower dose of sotalol. -pAF - Continue sotalol to 120 mg po BID. S/p AVJ ablation. Stable device function and normal EF.   -Paresthesias - upper torso and fingers, possible nerve impingement. Defer mgmt to PCP and orthopaedist at [facility]?   -Continue current therapy otherwise.   -Routine device interrogation. -EP follow up in 1 year or PRN. -Routine cardiac care per Dr. Ham Martin. Patient has been instructed and agrees to call our office with any issues or other concerns related to their cardiac condition(s) and/or complaint(s). No follow-up provider specified. Thank you for allowing me to participate in the electrophysiologic care of Ms. Valeria Lynne. Please contact me if any questions or concerns were to arise. Mendel Moccasin.  Danae RIGGINS, MS  Clinical Cardiac Electrophysiology  [facility] Cardiology  22  3:41 PM    ===================================================================  Chief Complant:    Chief Complaint   Patient presents with Irregular Heart Beat      Consultation is requested by Jony Reynolds MD for evaluation of Irregular Heart Beat    History:  Aileen Nayak is a most pleasant 78 y.o. female with a past medical and cardiac history significant for pAF and SSS s/p DCPM (BTK). She is referred back to EP from Dr. Layne Negron. She comes  in to discuss her shortness of breath. This is a chronic problem. She has a recent history of LHC which was stable. Recent NST consistent with normal EF. She has been treated with sotalol at a very high dose for at least the last 3-4 years. She otherwise is compliant with her medicines and does pretty well. She comes in for followup. She reports having some episodes of \"electrical shock\" where she'll feel tingling through her torso and out her fingers. These episodes last about 1 minute. Her pacemaker was interrogated on the dates of her symptoms and the patient had a stable rhythm. She did see her ortho doc at [facility] and it was noticed she was having L shoulder pain. The pain injection made a significant difference and her symptoms have been better since. The patient otherwise denies chest pain, dyspnea, presyncope, syncope or lateralizing symptoms. Cardiac PMH: (Old records have been reviewed and summarized below)   1. A. Fib - A. Fib ablation by Dr. Keren Sky in 2006   2. Pacemaker - Feb 2008, Gen Change - Biotronik   3. AV node ablation- Feb 2008 - Dr. Keren Sky. EKG:  (EKG has been independently visualized by me with interpretation below): A sensed, V paced. ECHO: 6/2022  Left Ventricle Normal left ventricular systolic function with a visually estimated EF of 55 - 60%. Left ventricle size is normal. Moderately increased wall thickness. Normal diastolic function. Left Atrium Left atrium size is normal.   Right Ventricle Right ventricle size is normal. Lead present in the right ventricle. Normal systolic function. Right Atrium Lead present in the right atrium.  Right atrium size is normal.   Aortic Valve Tricuspid valve. Moderate sclerosis of the aortic valve cusp. Trace regurgitation. Mitral Valve Mildly thickened leaflet. Mild annular calcification of the mitral valve. Trace regurgitation. Tricuspid Valve Valve structure is normal. Mild regurgitation. The estimated RVSP is 33 mmHg. Pulmonic Valve The pulmonic valve was not well visualized. Aorta Normal sized ascending aorta. IVC/Hepatic Veins IVC diameter is less than or equal to 21 mm and decreases greater than 50% during inspiration; therefore the estimated right atrial pressure is normal (~3 mmHg). Pericardium No pericardial effusion. ECHO: 8/2014   -  Left ventricle: Systolic function was normal. Ejection fraction was  estimated in the range of 55 % to 60 %. There were no regional wall motion   abnormalities. Doppler parameters were consistent with mild diastolic  dysfunction (grade 1). -  Mitral valve: There was moderate regurgitation.  -  Tricuspid valve: There was mild regurgitation. Previous Heart Catheterization: 8/2019   FINDINGS:   1. Left ventricle:  Normal left ventricular size. Normal left ventricular systolic function. Ejection fraction is 65%. There is no significant mitral regurgitation. No aortic valve gradient. Left ventricular end-diastolic pressure is measured  at 13 mmHg. 2.  Left main:  Left main is large, bifurcates into LAD and circumflex systems, appears angiographically normal.   3.  Left anterior descending coronary: It is a moderate-sized vessel. Stent that extends from the proximal throughout the mid remains widely patent with less than 20% in-stent restenosis. The jailed proximal diagonal is moderate in size and appears  normal.  The more distal LAD has moderate calcification. Has a 20% stenosis at the distal edge of the stent. There is a 30-40% stenosis in the transition to the more apical and relatively small portion of the vessel.    4.  Left circumflex coronary: It is a moderate-sized vessel. It is moderately calcified at the bifurcation of the first obtuse marginal and ongoing AV circumflex. This is associated with no stenosis. The first obtuse marginal is moderate to large  in size and appears normal.  The ongoing AV circumflex gives rise to two distal obtuse marginals, which are normal.   5.  Right coronary: It is a moderate-sized anatomically dominant vessel. Has moderate calcification. The lumen is smooth throughout its entire course, gives rise to a moderate-sized posterior descending and small posterolateral branch which appear  normal.       Successful hemostasis with pneumatic radial band. CONCLUSIONS:   1. Normal left ventricular systolic function. 2.  Stable-appearing atherosclerotic coronary disease with widely patent LAD stenting, mild to moderate nonobstructive disease involving the mid to distal LAD and normal-appearing left circumflex and right coronary arteries. Stress Test: 8/2019   Ejection Fraction: 62%    CONCLUSION:   1. Stress EKG: Non-diagnostic due to baseline EKG changes. (V-pacing   throughout)  2. SPECT Perfusion  Imaging: Abnormal due to anterior ischemia . 3. LV Systolic Function is normal.   4. Risk Assessment: Moderate Risk Scan. DEVICE INTERROGATION: BTK DCPM. All capture thresholds, lead sensing and impedance measurements are stable and consistent with a normal functioning  device. Battery life is stable. Device function demonstrating stable function. Will need to follow closely. Past Medical History, Past Surgical History, Family history, Social History, and Medications were all reviewed with the patient today and updated as necessary. Current Outpatient Medications   Medication Sig Dispense Refill    warfarin (COUMADIN) 5 MG tablet 1/2-1 tab po daily or as directed.  90 tablet 3    empagliflozin (JARDIANCE) 10 MG tablet Take 10 mg by mouth daily      ondansetron (ZOFRAN-ODT) 4 MG disintegrating tablet Take 4 mg by mouth 3 times daily as needed      acetaminophen (TYLENOL) 500 MG tablet Take by mouth every 6 hours as needed      Cholecalciferol 50 MCG (2000 UT) CAPS Take 2,000 Int'l Units by mouth daily      clopidogrel (PLAVIX) 75 MG tablet Take 75 mg by mouth daily      ezetimibe (ZETIA) 10 MG tablet Take 10 mg by mouth daily      famotidine (PEPCID) 20 MG tablet Take 20 mg by mouth      fexofenadine (ALLEGRA) 180 MG tablet Take 180 mg by mouth daily as needed      fluticasone (VERAMYST) 27.5 MCG/SPRAY nasal spray 2 sprays by Nasal route as needed       folic acid (FOLVITE) 1 MG tablet Take 1 mg by mouth daily      furosemide (LASIX) 40 MG tablet As needed      halobetasol (ULTRAVATE) 0.05 % cream Apply topically 2 times daily      levothyroxine (SYNTHROID) 150 MCG tablet Take 150 mcg by mouth daily      losartan-hydroCHLOROthiazide (HYZAAR) 50-12.5 MG per tablet Take 1 tablet by mouth daily      nitroGLYCERIN (NITROSTAT) 0.4 MG SL tablet Take 0.4 mg by mouth      pantoprazole (PROTONIX) 40 MG tablet Take 40 mg by mouth daily      pitavastatin (LIVALO) 4 MG TABS tablet Take by mouth daily      potassium chloride (KLOR-CON M) 20 MEQ extended release tablet Take 20 mEq by mouth daily      sotalol (BETAPACE) 120 MG tablet Take 120 mg by mouth 2 times daily       No current facility-administered medications for this visit.      Allergies   Allergen Reactions    Sulfa Antibiotics Swelling     Other reaction(s): Hives/Swelling-Allergy  Eye swelling    Atorvastatin Other (See Comments)     Other reaction(s): Myalgia-Intolerance    Codeine Nausea Only     Other reaction(s): Nausea and/or vomiting-Intolerance    Hydromorphone Other (See Comments)     Other reaction(s): Nausea and/or vomiting-Intolerance    Morphine Other (See Comments)     Other reaction(s): Nausea and/or vomiting-Intolerance    Simvastatin Other (See Comments)     Other reaction(s): Myalgia-Intolerance       Past Medical History: Diagnosis Date    Accelerated hypertension 2014    Aortic valve insufficiency 2014    Arrhythmia     at fib    Arthritis     Atrial fibrillation ([facility] 2014    CAD (coronary artery disease)     Cancer (HCC)     basal    Congestive heart failure (CHF) ([facility] 10/29/2015    Coronary atherosclerosis 10/29/2015    GERD (gastroesophageal reflux disease)     Hypertension     Hypokalemia 2014    Nausea & vomiting     Pacemaker 2014    Thyroid disease     Unstable angina ([facility] 2014     Past Surgical History:   Procedure Laterality Date    BREAST BIOPSY Left     Lt breast approx. 25 yrs ago. GYN      total hysterectomy    OTHER SURGICAL HISTORY      nephrectomy  3rd kidney    PACEMAKER      OR ABDOMEN SURGERY PROC UNLISTED      gallbladder    OR CARDIAC SURG PROCEDURE UNLIST      card cath stents,at fib ablation     Family History   Problem Relation Age of Onset    Breast Cancer Neg Hx     Heart Disease Mother     Heart Disease Brother     Heart Disease Brother      Social History     Tobacco Use    Smoking status: Former     Types: Cigarettes     Quit date: 10/29/1985     Years since quittin.1    Smokeless tobacco: Never   Substance Use Topics    Alcohol use: No       ROS:  A comprehensive review of systems was performed with the pertinent positives and negatives as noted in the HPI in addition to:  Review of Systems   Constitutional: Negative. HENT: Negative. Eyes: Negative. Respiratory: Negative. Cardiovascular: Negative. Gastrointestinal: Negative. Endocrine: Negative. Genitourinary: Negative. Musculoskeletal: Negative. Skin: Negative. Allergic/Immunologic: Negative. Neurological: Negative. Hematological: Negative. Psychiatric/Behavioral: Negative. All other systems reviewed and are negative.       PHYSICAL EXAM:   /70   Pulse 76   Ht 5' 6\" (1.676 m)   Wt 156 lb 11.2 oz (71.1 kg)   BMI 25.29 kg/m²      Wt Readings from Last 3 Encounters:   11/29/22 156 lb 11.2 oz (71.1 kg)   06/22/22 158 lb (71.7 kg)   06/20/22 163 lb (73.9 kg)     BP Readings from Last 3 Encounters:   11/29/22 128/70   06/22/22 (!) 140/60   06/20/22 (!) 146/76     Gen: Well appearing, well developed, no acute distress  Eyes: Pupils equal, round. Extraocular movements are intact  ENT: Oropharynx clear, no oral lesions, normal dentition  CV: S1S2, regular rate and rhythm, no murmurs, rubs or gallops, normal JVD, no carotid bruits, normal distal pulses, no BERYL, left sided CIED C/D/I. Pulm: Clear to auscultation bilaterally, no accessory muscle uses, no wheezes or rales  GI: Soft, NT, ND, +BS  Neuro: Alert and oriented, nonfocal  Psych: Appropriate affect  Skin: Normal color and skin turgor  MSK: Normal muscle bulk and tone    Medical problems and test results were reviewed with the patient today.      Results for orders placed or performed in visit on 11/29/22   PT/INR (Resulted at [facility]/in-office AND billed by [facility])   Result Value Ref Range    Prothrombin time, POC      POC INR 1.9

## 2022-11-29 NOTE — PROGRESS NOTES
Tablet strength and weekly dosing schedule confirmed today. Off coumadin x 10 days for procedure. Restarted 11/142022.

## 2022-11-29 NOTE — PATIENT INSTRUCTIONS
Reminder: Please contact the Coumadin Clinic at [phone] when you have medication changes. Examples, new medications, antibiotics, discontinued medications, new supplements, missed doses of warfarin or if you took extra doses of warfarin. This also includes OTC medications. Notifying us helps reduce the possibility of high and low INR's. In addition, if warfarin needs to be held for any procedures, please have surgeon or physician's office contact us before holding anticoagulant. Thanks, [facility] Cardiology Coumadin Clinic.

## 2022-12-12 ENCOUNTER — TELEPHONE (OUTPATIENT)
Dept: CARDIOLOGY CLINIC | Age: 82
End: 2022-12-12

## 2022-12-12 NOTE — TELEPHONE ENCOUNTER
Pt needs samples for the livalo 4 mg from the [facility].  Pt will be there tomorrow for a pt lab

## 2022-12-13 ENCOUNTER — ANTI-COAG VISIT (OUTPATIENT)
Dept: CARDIOLOGY CLINIC | Age: 82
End: 2022-12-13
Payer: MEDICARE

## 2022-12-13 DIAGNOSIS — I48.0 PAROXYSMAL ATRIAL FIBRILLATION (HCC): ICD-10-CM

## 2022-12-13 DIAGNOSIS — Z79.01 LONG TERM (CURRENT) USE OF ANTICOAGULANTS: Primary | ICD-10-CM

## 2022-12-13 LAB
POC INR: 2.4
PROTHROMBIN TIME, POC: NORMAL

## 2022-12-13 PROCEDURE — 85610 PROTHROMBIN TIME: CPT | Performed by: INTERNAL MEDICINE

## 2022-12-13 PROCEDURE — 93793 ANTICOAG MGMT PT WARFARIN: CPT | Performed by: INTERNAL MEDICINE

## 2022-12-13 NOTE — PATIENT INSTRUCTIONS
Reminder: Please contact the Coumadin Clinic at [phone] when you have medication changes. Examples, new medications, antibiotics, discontinued medications, new supplements, missed doses of warfarin or if you took extra doses of warfarin. This also includes OTC medications. Notifying us helps reduce the possibility of high and low INR's. In addition, if warfarin needs to be held for any procedures, please have surgeon or physician's office contact us before holding anticoagulant. Thanks, [facility] Cardiology Coumadin Clinic.

## 2022-12-13 NOTE — TELEPHONE ENCOUNTER
Spoke with Job Smith in [place] and she stated that they do not have any Livalo 4mg samples at the moment

## 2022-12-13 NOTE — PROGRESS NOTES
Tablet strength and weekly dosing schedule confirmed today. Continue current maintenance plan (see Anticoag Dosing Calendar). INR to be rechecked in four weeks.

## 2022-12-16 DIAGNOSIS — Z95.0 PACEMAKER: ICD-10-CM

## 2022-12-28 ASSESSMENT — ENCOUNTER SYMPTOMS
COUGH: 0
SORE THROAT: 0
ABDOMINAL PAIN: 0
ABDOMINAL DISTENTION: 0
DIARRHEA: 0
PHOTOPHOBIA: 0
SHORTNESS OF BREATH: 0
CONSTIPATION: 0

## 2022-12-29 ENCOUNTER — OFFICE VISIT (OUTPATIENT)
Dept: CARDIOLOGY CLINIC | Age: 82
End: 2022-12-29
Payer: MEDICARE

## 2022-12-29 VITALS
HEART RATE: 76 BPM | WEIGHT: 153 LBS | DIASTOLIC BLOOD PRESSURE: 70 MMHG | SYSTOLIC BLOOD PRESSURE: 132 MMHG | HEIGHT: 66 IN | BODY MASS INDEX: 24.59 KG/M2

## 2022-12-29 DIAGNOSIS — Z79.01 LONG TERM (CURRENT) USE OF ANTICOAGULANTS: ICD-10-CM

## 2022-12-29 DIAGNOSIS — E78.5 DYSLIPIDEMIA: ICD-10-CM

## 2022-12-29 DIAGNOSIS — I25.10 ATHEROSCLEROSIS OF NATIVE CORONARY ARTERY OF NATIVE HEART WITHOUT ANGINA PECTORIS: Primary | ICD-10-CM

## 2022-12-29 DIAGNOSIS — Z95.0 PACEMAKER: ICD-10-CM

## 2022-12-29 DIAGNOSIS — I47.1 ATRIAL TACHYCARDIA (HCC): ICD-10-CM

## 2022-12-29 DIAGNOSIS — I10 PRIMARY HYPERTENSION: ICD-10-CM

## 2022-12-29 DIAGNOSIS — I48.0 PAROXYSMAL ATRIAL FIBRILLATION (HCC): ICD-10-CM

## 2022-12-29 PROCEDURE — G8484 FLU IMMUNIZE NO ADMIN: HCPCS | Performed by: INTERNAL MEDICINE

## 2022-12-29 PROCEDURE — G8400 PT W/DXA NO RESULTS DOC: HCPCS | Performed by: INTERNAL MEDICINE

## 2022-12-29 PROCEDURE — 3078F DIAST BP <80 MM HG: CPT | Performed by: INTERNAL MEDICINE

## 2022-12-29 PROCEDURE — 3074F SYST BP LT 130 MM HG: CPT | Performed by: INTERNAL MEDICINE

## 2022-12-29 PROCEDURE — G8420 CALC BMI NORM PARAMETERS: HCPCS | Performed by: INTERNAL MEDICINE

## 2022-12-29 PROCEDURE — G8427 DOCREV CUR MEDS BY ELIG CLIN: HCPCS | Performed by: INTERNAL MEDICINE

## 2022-12-29 PROCEDURE — 1123F ACP DISCUSS/DSCN MKR DOCD: CPT | Performed by: INTERNAL MEDICINE

## 2022-12-29 PROCEDURE — 99214 OFFICE O/P EST MOD 30 MIN: CPT | Performed by: INTERNAL MEDICINE

## 2022-12-29 PROCEDURE — 1090F PRES/ABSN URINE INCON ASSESS: CPT | Performed by: INTERNAL MEDICINE

## 2022-12-29 PROCEDURE — 93000 ELECTROCARDIOGRAM COMPLETE: CPT | Performed by: INTERNAL MEDICINE

## 2022-12-29 PROCEDURE — 1036F TOBACCO NON-USER: CPT | Performed by: INTERNAL MEDICINE

## 2022-12-29 NOTE — PROGRESS NOTES
[facility] CARDIOLOGY  [address], [address]  PHONE: [phone]        NAME:  Jessica Baez  : 1940  MRN: [id number]     PCP:  Brittany Castaneda MD      SUBJECTIVE:   Jessica Baez is a 78 y.o. female seen for a follow up visit regarding the following:     Chief Complaint   Patient presents with    New Patient     Dr Sukhwinder Galaviz patient    Coronary Artery Disease    Atrial Fibrillation    Hypertension       HPI:  She presents establish new cardiac care, previously cared for by Dr. Katlin Rene. Doing well since last visit without interval angina, CHF, palpitations, edema, presyncope or syncope. Vitals controlled and tolerating meds well. Staying active without any significant limitations. Echo 2022:  Left Ventricle Normal left ventricular systolic function with a visually estimated EF of 55 - 60%. Left ventricle size is normal. Moderately increased wall thickness. Normal diastolic function. Left Atrium Left atrium size is normal.   Right Ventricle Right ventricle size is normal. Lead present in the right ventricle. Normal systolic function. Right Atrium Lead present in the right atrium. Right atrium size is normal.   Aortic Valve Tricuspid valve. Moderate sclerosis of the aortic valve cusp. Trace regurgitation. Mitral Valve Mildly thickened leaflet. Mild annular calcification of the mitral valve. Trace regurgitation. Tricuspid Valve Valve structure is normal. Mild regurgitation. The estimated RVSP is 33 mmHg. Pulmonic Valve The pulmonic valve was not well visualized. Aorta Normal sized ascending aorta. IVC/Hepatic Veins IVC diameter is less than or equal to 21 mm and decreases greater than 50% during inspiration; therefore the estimated right atrial pressure is normal (~3 mmHg). Pericardium No pericardial effusion.      Past Medical History, Past Surgical History, Family history, Social History, and Medications were all reviewed with the patient today and updated as necessary. Current Outpatient Medications   Medication Sig Dispense Refill    warfarin (COUMADIN) 5 MG tablet 1/2-1 tab po daily or as directed. 90 tablet 3    empagliflozin (JARDIANCE) 10 MG tablet Take 10 mg by mouth daily      ondansetron (ZOFRAN-ODT) 4 MG disintegrating tablet Take 4 mg by mouth 3 times daily as needed      acetaminophen (TYLENOL) 500 MG tablet Take by mouth every 6 hours as needed      Cholecalciferol 50 MCG (2000 UT) CAPS Take 2,000 Int'l Units by mouth daily      clopidogrel (PLAVIX) 75 MG tablet Take 75 mg by mouth daily      ezetimibe (ZETIA) 10 MG tablet Take 10 mg by mouth daily      famotidine (PEPCID) 20 MG tablet Take 20 mg by mouth      fexofenadine (ALLEGRA) 180 MG tablet Take 180 mg by mouth daily as needed      fluticasone (VERAMYST) 27.5 MCG/SPRAY nasal spray 2 sprays by Nasal route as needed       folic acid (FOLVITE) 1 MG tablet Take 1 mg by mouth daily      furosemide (LASIX) 40 MG tablet As needed      halobetasol (ULTRAVATE) 0.05 % cream Apply topically 2 times daily      levothyroxine (SYNTHROID) 150 MCG tablet Take 150 mcg by mouth daily      losartan-hydroCHLOROthiazide (HYZAAR) 50-12.5 MG per tablet Take 1 tablet by mouth daily      nitroGLYCERIN (NITROSTAT) 0.4 MG SL tablet Take 0.4 mg by mouth      pantoprazole (PROTONIX) 40 MG tablet Take 40 mg by mouth daily      pitavastatin (LIVALO) 4 MG TABS tablet Take by mouth daily      potassium chloride (KLOR-CON M) 20 MEQ extended release tablet Take 20 mEq by mouth daily      sotalol (BETAPACE) 120 MG tablet Take 120 mg by mouth 2 times daily       No current facility-administered medications for this visit.             Allergies   Allergen Reactions    Sulfa Antibiotics Swelling     Other reaction(s): Hives/Swelling-Allergy  Eye swelling    Atorvastatin Other (See Comments)     Other reaction(s): Myalgia-Intolerance    Codeine Nausea Only     Other reaction(s): Nausea and/or vomiting-Intolerance    Hydromorphone Other (See Comments)     Other reaction(s): Nausea and/or vomiting-Intolerance    Morphine Other (See Comments)     Other reaction(s): Nausea and/or vomiting-Intolerance    Simvastatin Other (See Comments)     Other reaction(s): Myalgia-Intolerance       Patient Active Problem List    Diagnosis Date Noted    SIRS (systemic inflammatory response syndrome) ([facility] 2021     Overview Note:     Suspected but not proven        Sepsis ([facility] [address] [facility] 2021     Overview Note:     Suspected but not proven        Diverticulitis 2021    Dyslipidemia 2019    Long term (current) use of anticoagulants 2018    Atrial tachycardia ([facility] 10/24/2016    Amaurosis fugax, both eyes 2016    Hypertension 10/29/2015    Congestive heart failure (CHF) ([facility] 10/29/2015    Coronary atherosclerosis 10/29/2015     Overview Note:     remote LAD stenting   NL LV   last cath patent         Aortic valve insufficiency 2014    Hypokalemia 2014    Pacemaker 2014    Paroxysmal atrial fibrillation ([facility] 2014        Past Surgical History:   Procedure Laterality Date    BREAST BIOPSY Left     Lt breast approx. 25 yrs ago. GYN      total hysterectomy    OTHER SURGICAL HISTORY      nephrectomy  3rd kidney    PACEMAKER      VT ABDOMEN SURGERY PROC UNLISTED      gallbladder    VT CARDIAC SURG PROCEDURE UNLIST      card cath stents,at fib ablation       Family History   Problem Relation Age of Onset    Breast Cancer Neg Hx     Heart Disease Mother     Heart Disease Brother     Heart Disease Brother         Social History     Tobacco Use    Smoking status: Former     Types: Cigarettes     Quit date: 10/29/1985     Years since quittin.1    Smokeless tobacco: Never   Substance Use Topics    Alcohol use: No       ROS:    Review of Systems   Constitutional:  Negative for appetite change, chills, diaphoresis and fatigue.    HENT:  Negative for congestion, mouth sores, nosebleeds, sore throat and tinnitus. Eyes:  Negative for photophobia and visual disturbance. Respiratory:  Negative for cough and shortness of breath. Cardiovascular:  Negative for chest pain, palpitations and leg swelling. Gastrointestinal:  Negative for abdominal distention, abdominal pain, constipation and diarrhea. Endocrine: Negative for cold intolerance, heat intolerance, polydipsia and polyuria. Genitourinary:  Negative for dysuria and hematuria. Musculoskeletal:  Negative for arthralgias, joint swelling and myalgias. Skin:  Negative for rash. Allergic/Immunologic: Negative for environmental allergies and food allergies. Neurological:  Negative for dizziness, seizures, syncope and light-headedness. Hematological:  Negative for adenopathy. Does not bruise/bleed easily. Psychiatric/Behavioral:  Negative for agitation, behavioral problems, dysphoric mood and hallucinations. The patient is not nervous/anxious. PHYSICAL EXAM:     Vitals:    12/29/22 1129   BP: 132/70   Pulse: 76   Weight: 153 lb (69.4 kg)   Height: 5' 6\" (1.676 m)      Wt Readings from Last 3 Encounters:   12/29/22 153 lb (69.4 kg)   11/29/22 156 lb 11.2 oz (71.1 kg)   06/22/22 158 lb (71.7 kg)      BP Readings from Last 3 Encounters:   12/29/22 132/70   11/29/22 128/70   06/22/22 (!) 140/60        Physical Exam  Constitutional:       Appearance: Normal appearance. She is normal weight. HENT:      Head: Normocephalic and atraumatic. Nose: Nose normal.      Mouth/Throat:      Mouth: Mucous membranes are moist.      Pharynx: Oropharynx is clear. Eyes:      Extraocular Movements: Extraocular movements intact. Pupils: Pupils are equal, round, and reactive to light. Neck:      Vascular: No carotid bruit or JVD. Cardiovascular:      Rate and Rhythm: Normal rate and regular rhythm. Heart sounds: No murmur heard. No friction rub. No gallop.       Comments: Pacemaker site left chest well-healed  Pulmonary:      Effort: Pulmonary effort is normal.      Breath sounds: Normal breath sounds. No wheezing or rhonchi. Abdominal:      General: Abdomen is flat. Bowel sounds are normal. There is no distension. Palpations: Abdomen is soft. Tenderness: There is no abdominal tenderness. Musculoskeletal:         General: No swelling. Normal range of motion. Cervical back: Normal range of motion and neck supple. No tenderness. Skin:     General: Skin is warm and dry. Neurological:      General: No focal deficit present. Mental Status: She is alert and oriented to person, place, and time. Mental status is at baseline. Psychiatric:         Mood and Affect: Mood normal.         Behavior: Behavior normal.        Medical problems and test results were reviewed with the patient today. No results found for: CHOL  No results found for: TRIG  No results found for: HDL  No results found for: LDLCHOLESTEROL, LDLCALC  No results found for: LABVLDL, VLDL  No results found for: [facility]     Lab Results   Component Value Date/Time     05/09/2022 11:44 AM    K 3.6 05/09/2022 11:44 AM     05/09/2022 11:44 AM    CO2 34 05/09/2022 11:44 AM    BUN 16 05/09/2022 11:44 AM    CREATININE 1.00 05/09/2022 11:44 AM    GLUCOSE 105 05/09/2022 11:44 AM    CALCIUM 8.9 05/09/2022 11:44 AM         No results for input(s): WBC, HGB, HCT, MCV, PLT in the last 720 hours. No results found for: LABA1C  No results found for: EAG     Lab Results   Component Value Date     05/09/2022        No results found for: TSHFT4, TSH     Results for orders placed or performed in visit on 12/29/22   EKG 12 Lead    Impression    AV sequential pacing 76 bpm        ASSESSMENT and PLAN     1. Paroxysmal atrial fibrillation ([address]- s/p AVJ ablation. Doing well, continue meds and routine surveillance      2. Essential hypertension-stable, continue meds, stay hydrated, minimize sodium      3. Atherosclerosis of native coronary artery of native heart without angina pectoris-doing well, asymptomatic. Continue meds      4. Atrial tachycardia (HCC)-no recent symptomatic paroxysms, continue sotalol. 5. Chronic diastolic congestive heart failure (HCC)-euvolemic, no recent flares. Continue Jardiance and sodium minimization. 6. Pacemaker, [employer] dual chamber pacemaker- continue routine surveillance. 7. Long term (current) use of anticoagulants- continue meds. 8. Dyslipidemia- continue healthy diet and current meds. Return in about 6 months (around 6/29/2023).          Mateo Ramesh MD  12/29/2022  11:57 AM

## 2023-01-05 ENCOUNTER — TELEPHONE (OUTPATIENT)
Dept: CARDIOLOGY CLINIC | Age: 83
End: 2023-01-05

## 2023-01-05 NOTE — TELEPHONE ENCOUNTER
Left voicemail and informed pt that samples are ready to be picked up at [facility]

## 2023-01-10 ENCOUNTER — ANTI-COAG VISIT (OUTPATIENT)
Dept: CARDIOLOGY CLINIC | Age: 83
End: 2023-01-10
Payer: MEDICARE

## 2023-01-10 DIAGNOSIS — I48.0 PAROXYSMAL ATRIAL FIBRILLATION (HCC): ICD-10-CM

## 2023-01-10 DIAGNOSIS — Z79.01 LONG TERM (CURRENT) USE OF ANTICOAGULANTS: Primary | ICD-10-CM

## 2023-01-10 LAB
POC INR: 1.7
PROTHROMBIN TIME, POC: ABNORMAL

## 2023-01-10 PROCEDURE — 85610 PROTHROMBIN TIME: CPT | Performed by: INTERNAL MEDICINE

## 2023-01-10 PROCEDURE — 93793 ANTICOAG MGMT PT WARFARIN: CPT | Performed by: INTERNAL MEDICINE

## 2023-01-10 NOTE — PATIENT INSTRUCTIONS
Reminder: Please contact the Coumadin Clinic at [phone] when you have medication changes. Examples, new medications, antibiotics, discontinued medications, new supplements, missed doses of warfarin or if you took extra doses of warfarin. This also includes OTC medications. Notifying us helps reduce the possibility of high and low INR's. In addition, if warfarin needs to be held for any procedures, please have surgeon or physician's office contact us before holding anticoagulant. Thanks, [facility] Cardiology Coumadin Clinic.

## 2023-02-01 ENCOUNTER — TELEPHONE (OUTPATIENT)
Dept: CARDIOLOGY CLINIC | Age: 83
End: 2023-02-01

## 2023-02-02 NOTE — TELEPHONE ENCOUNTER
Samples placed up front at [facility] per Anali.  Called pt and informer her they are ready to be picked up

## 2023-02-07 ENCOUNTER — ANTI-COAG VISIT (OUTPATIENT)
Dept: CARDIOLOGY CLINIC | Age: 83
End: 2023-02-07
Payer: MEDICARE

## 2023-02-07 DIAGNOSIS — I48.0 PAROXYSMAL ATRIAL FIBRILLATION (HCC): ICD-10-CM

## 2023-02-07 DIAGNOSIS — Z79.01 LONG TERM (CURRENT) USE OF ANTICOAGULANTS: Primary | ICD-10-CM

## 2023-02-07 LAB
POC INR: 1.8
PROTHROMBIN TIME, POC: ABNORMAL

## 2023-02-07 PROCEDURE — 85610 PROTHROMBIN TIME: CPT | Performed by: INTERNAL MEDICINE

## 2023-02-07 PROCEDURE — 93793 ANTICOAG MGMT PT WARFARIN: CPT | Performed by: INTERNAL MEDICINE

## 2023-02-07 NOTE — PATIENT INSTRUCTIONS
Reminder: Please contact the Coumadin Clinic at [phone] when you have medication changes. Examples, new medications, antibiotics, discontinued medications, new supplements, missed doses of warfarin or if you took extra doses of warfarin. This also includes OTC medications. Notifying us helps reduce the possibility of high and low INR's. In addition, if warfarin needs to be held for any procedures, please have surgeon or physician's office contact us before holding anticoagulant. Thanks, [facility] Cardiology Coumadin Clinic.

## 2023-02-09 PROCEDURE — 93294 REM INTERROG EVL PM/LDLS PM: CPT | Performed by: INTERNAL MEDICINE

## 2023-02-09 PROCEDURE — 93296 REM INTERROG EVL PM/IDS: CPT | Performed by: INTERNAL MEDICINE

## 2023-02-21 ENCOUNTER — TELEPHONE (OUTPATIENT)
Dept: CARDIOLOGY CLINIC | Age: 83
End: 2023-02-21

## 2023-03-07 ENCOUNTER — ANTI-COAG VISIT (OUTPATIENT)
Dept: CARDIOLOGY CLINIC | Age: 83
End: 2023-03-07
Payer: MEDICARE

## 2023-03-07 DIAGNOSIS — Z79.01 LONG TERM (CURRENT) USE OF ANTICOAGULANTS: Primary | ICD-10-CM

## 2023-03-07 DIAGNOSIS — I48.0 PAROXYSMAL ATRIAL FIBRILLATION (HCC): ICD-10-CM

## 2023-03-07 LAB
POC INR: 2.3
PROTHROMBIN TIME, POC: NORMAL

## 2023-03-07 PROCEDURE — 93793 ANTICOAG MGMT PT WARFARIN: CPT | Performed by: INTERNAL MEDICINE

## 2023-03-07 PROCEDURE — 85610 PROTHROMBIN TIME: CPT | Performed by: INTERNAL MEDICINE

## 2023-03-07 NOTE — PATIENT INSTRUCTIONS
Reminder: Please contact the Coumadin Clinic at [phone] when you have medication changes. Examples, new medications, antibiotics, discontinued medications, new supplements, missed doses of warfarin or if you took extra doses of warfarin. This also includes OTC medications. Notifying us helps reduce the possibility of high and low INR's. In addition, if warfarin needs to be held for any procedures, please have surgeon or physician's office contact us before holding anticoagulant. Thanks, [facility] Cardiology Coumadin Clinic.

## 2023-03-20 ENCOUNTER — TELEPHONE (OUTPATIENT)
Dept: CARDIOLOGY CLINIC | Age: 83
End: 2023-03-20

## 2023-03-22 DIAGNOSIS — I48.0 PAROXYSMAL ATRIAL FIBRILLATION (HCC): ICD-10-CM

## 2023-03-22 DIAGNOSIS — Z95.0 PACEMAKER: Primary | ICD-10-CM

## 2023-03-22 DIAGNOSIS — Z95.0 PACEMAKER: ICD-10-CM

## 2023-04-03 ENCOUNTER — ANTI-COAG VISIT (OUTPATIENT)
Dept: CARDIOLOGY CLINIC | Age: 83
End: 2023-04-03
Payer: MEDICARE

## 2023-04-03 DIAGNOSIS — I48.0 PAROXYSMAL ATRIAL FIBRILLATION (HCC): ICD-10-CM

## 2023-04-03 DIAGNOSIS — Z79.01 LONG TERM (CURRENT) USE OF ANTICOAGULANTS: Primary | ICD-10-CM

## 2023-04-03 LAB
POC INR: 3.3
PROTHROMBIN TIME, POC: ABNORMAL

## 2023-04-03 PROCEDURE — 85610 PROTHROMBIN TIME: CPT | Performed by: INTERNAL MEDICINE

## 2023-04-03 PROCEDURE — 93793 ANTICOAG MGMT PT WARFARIN: CPT | Performed by: INTERNAL MEDICINE

## 2023-04-03 NOTE — PATIENT INSTRUCTIONS
Reminder: Please contact the Coumadin Clinic at [phone] when you have medication changes. Examples, new medications, antibiotics, discontinued medications, new supplements, missed doses of warfarin or if you took extra doses of warfarin. This also includes OTC medications. Notifying us helps reduce the possibility of high and low INR's. In addition, if warfarin needs to be held for any procedures, please have surgeon or physician's office contact us before holding anticoagulant. Thanks, [facility] Cardiology Coumadin Clinic.

## 2023-04-20 ENCOUNTER — ANTI-COAG VISIT (OUTPATIENT)
Dept: CARDIOLOGY CLINIC | Age: 83
End: 2023-04-20

## 2023-04-20 DIAGNOSIS — I48.0 PAROXYSMAL ATRIAL FIBRILLATION (HCC): ICD-10-CM

## 2023-04-20 DIAGNOSIS — Z79.01 LONG TERM (CURRENT) USE OF ANTICOAGULANTS: Primary | ICD-10-CM

## 2023-04-20 LAB
POC INR: 1.1
PROTHROMBIN TIME, POC: ABNORMAL

## 2023-04-20 NOTE — PROGRESS NOTES
Tablet strength and weekly dosing schedule confirmed today. Has been holding warfarin for an endoscopy, scheduled for tomorrow. Post operatively, resume current maintenance plan (see Anticoag Dosing Calendar). INR to be rechecked in one week(s).

## 2023-04-20 NOTE — PATIENT INSTRUCTIONS
Reminder: Please contact the Coumadin Clinic at [phone] when you have medication changes. Examples, new medications, antibiotics, discontinued medications, new supplements, missed doses of warfarin or if you took extra doses of warfarin. This also includes OTC medications. Notifying us helps reduce the possibility of high and low INR's. In addition, if warfarin needs to be held for any procedures, please have surgeon or physician's office contact us before holding anticoagulant. Thanks, [facility] Cardiology Coumadin Clinic.

## 2023-04-28 ENCOUNTER — ANTI-COAG VISIT (OUTPATIENT)
Dept: CARDIOLOGY CLINIC | Age: 83
End: 2023-04-28

## 2023-04-28 DIAGNOSIS — I48.0 PAROXYSMAL ATRIAL FIBRILLATION (HCC): ICD-10-CM

## 2023-04-28 DIAGNOSIS — Z79.01 LONG TERM (CURRENT) USE OF ANTICOAGULANTS: Primary | ICD-10-CM

## 2023-04-28 LAB
POC INR: 1.4
PROTHROMBIN TIME, POC: ABNORMAL

## 2023-04-28 NOTE — PATIENT INSTRUCTIONS
Reminder: Please contact the Coumadin Clinic at [phone] when you have medication changes. Examples, new medications, antibiotics, discontinued medications, new supplements, missed doses of warfarin or if you took extra doses of warfarin. This also includes OTC medications. Notifying us helps reduce the possibility of high and low INR's. In addition, if warfarin needs to be held for any procedures, please have surgeon or physician's office contact us before holding anticoagulant. Thanks, [place] [facility] Cardiology Coumadin Clinic.

## 2023-04-28 NOTE — PROGRESS NOTES
Tablet strength and weekly dosing schedule confirmed today. Pt was holding coumadin for procedure but is now back on it.

## 2023-05-02 RX ORDER — SOTALOL HYDROCHLORIDE 120 MG/1
120 TABLET ORAL 2 TIMES DAILY
Qty: 180 TABLET | Refills: 3 | Status: SHIPPED | OUTPATIENT
Start: 2023-05-02

## 2023-05-02 NOTE — TELEPHONE ENCOUNTER
MEDICATION REFILL REQUEST      Name of Medication:  Sotalol  Dose:  120 mg  Frequency:  BID  Quantity:  ?  Days' supply:  ?       Pharmacy Name/Location:  [id number]

## 2023-05-12 ENCOUNTER — ANTI-COAG VISIT (OUTPATIENT)
Age: 83
End: 2023-05-12

## 2023-05-12 DIAGNOSIS — Z79.01 LONG TERM (CURRENT) USE OF ANTICOAGULANTS: Primary | ICD-10-CM

## 2023-05-12 DIAGNOSIS — I48.0 PAROXYSMAL ATRIAL FIBRILLATION (HCC): ICD-10-CM

## 2023-05-12 LAB
POC INR: 3.1
PROTHROMBIN TIME, POC: ABNORMAL

## 2023-05-12 NOTE — PATIENT INSTRUCTIONS
Reminder: Please contact the Coumadin Clinic at [phone] when you have medication changes. Examples, new medications, antibiotics, discontinued medications, new supplements, missed doses of warfarin or if you took extra doses of warfarin. This also includes OTC medications. Notifying us helps reduce the possibility of high and low INR's. In addition, if warfarin needs to be held for any procedures, please have surgeon or physician's office contact us before holding anticoagulant. Thanks, [facility] Cardiology Coumadin Clinic.

## 2023-05-15 ENCOUNTER — TELEPHONE (OUTPATIENT)
Dept: CARDIOLOGY CLINIC | Age: 83
End: 2023-05-15

## 2023-05-15 NOTE — TELEPHONE ENCOUNTER
Samples placed up front at [facility].  Called pt and informed her that they are ready to be picked up

## 2023-05-31 ENCOUNTER — ANTI-COAG VISIT (OUTPATIENT)
Age: 83
End: 2023-05-31
Payer: MEDICARE

## 2023-05-31 DIAGNOSIS — I48.0 PAROXYSMAL ATRIAL FIBRILLATION (HCC): ICD-10-CM

## 2023-05-31 DIAGNOSIS — Z79.01 LONG TERM (CURRENT) USE OF ANTICOAGULANTS: Primary | ICD-10-CM

## 2023-05-31 LAB
POC INR: 2.6
PROTHROMBIN TIME, POC: NORMAL

## 2023-05-31 PROCEDURE — 93793 ANTICOAG MGMT PT WARFARIN: CPT | Performed by: INTERNAL MEDICINE

## 2023-05-31 PROCEDURE — 85610 PROTHROMBIN TIME: CPT | Performed by: INTERNAL MEDICINE

## 2023-05-31 NOTE — TELEPHONE ENCOUNTER
Pt came for PT apt and ask for refill for Jardiance 10 mg. Please send to [facility] at [facility].

## 2023-05-31 NOTE — PATIENT INSTRUCTIONS
Reminder: Please contact the Coumadin Clinic at [phone] when you have medication changes. Examples, new medications, antibiotics, discontinued medications, new supplements, missed doses of warfarin or if you took extra doses of warfarin. This also includes OTC medications. Notifying us helps reduce the possibility of high and low INR's. In addition, if warfarin needs to be held for any procedures, please have surgeon or physician's office contact us before holding anticoagulant. Thanks, [facility] Cardiology Coumadin Clinic.

## 2023-06-28 ENCOUNTER — TELEPHONE (OUTPATIENT)
Age: 83
End: 2023-06-28

## 2023-06-28 ENCOUNTER — ANTI-COAG VISIT (OUTPATIENT)
Age: 83
End: 2023-06-28
Payer: MEDICARE

## 2023-06-28 DIAGNOSIS — I48.0 PAROXYSMAL ATRIAL FIBRILLATION (HCC): ICD-10-CM

## 2023-06-28 DIAGNOSIS — Z79.01 LONG TERM (CURRENT) USE OF ANTICOAGULANTS: Primary | ICD-10-CM

## 2023-06-28 LAB
POC INR: 2.2
PROTHROMBIN TIME, POC: NORMAL

## 2023-06-28 PROCEDURE — 93793 ANTICOAG MGMT PT WARFARIN: CPT | Performed by: INTERNAL MEDICINE

## 2023-06-28 PROCEDURE — 85610 PROTHROMBIN TIME: CPT | Performed by: INTERNAL MEDICINE

## 2023-07-04 ASSESSMENT — ENCOUNTER SYMPTOMS
CONSTIPATION: 0
ABDOMINAL PAIN: 0
PHOTOPHOBIA: 0
SORE THROAT: 0
DIARRHEA: 0
SHORTNESS OF BREATH: 0
COUGH: 0
ABDOMINAL DISTENTION: 0

## 2023-07-04 NOTE — PROGRESS NOTES
[facility] CARDIOLOGY  [address] [place] [address]  PHONE: [phone]        NAME:  Félix Griffin  : 1940  MRN: [id number]     PCP:  Corey Faria MD      SUBJECTIVE:   Félix Griffin is a 78 y.o. female seen for a follow up visit regarding the following:     Chief Complaint   Patient presents with    Hypertension    Congestive Heart Failure    Atrial Fibrillation       HPI:  She presents establish new cardiac care, previously cared for by Dr. Fede Sin. Doing well since last visit without interval angina, CHF, palpitations, edema, presyncope or syncope. Vitals controlled and tolerating meds well. Staying active without any significant limitations. Echo 2022:  Left Ventricle Normal left ventricular systolic function with a visually estimated EF of 55 - 60%. Left ventricle size is normal. Moderately increased wall thickness. Normal diastolic function. Left Atrium Left atrium size is normal.   Right Ventricle Right ventricle size is normal. Lead present in the right ventricle. Normal systolic function. Right Atrium Lead present in the right atrium. Right atrium size is normal.   Aortic Valve Tricuspid valve. Moderate sclerosis of the aortic valve cusp. Trace regurgitation. Mitral Valve Mildly thickened leaflet. Mild annular calcification of the mitral valve. Trace regurgitation. Tricuspid Valve Valve structure is normal. Mild regurgitation. The estimated RVSP is 33 mmHg. Pulmonic Valve The pulmonic valve was not well visualized. Aorta Normal sized ascending aorta. IVC/Hepatic Veins IVC diameter is less than or equal to 21 mm and decreases greater than 50% during inspiration; therefore the estimated right atrial pressure is normal (~3 mmHg). Pericardium No pericardial effusion.      Past Medical History, Past Surgical History, Family history, Social History, and Medications were all reviewed with the patient today and updated as

## 2023-07-05 DIAGNOSIS — Z95.0 PACEMAKER: Primary | ICD-10-CM

## 2023-07-05 DIAGNOSIS — I48.0 PAROXYSMAL ATRIAL FIBRILLATION (HCC): ICD-10-CM

## 2023-07-07 ENCOUNTER — OFFICE VISIT (OUTPATIENT)
Age: 83
End: 2023-07-07

## 2023-07-07 VITALS
HEART RATE: 76 BPM | HEIGHT: 64 IN | SYSTOLIC BLOOD PRESSURE: 119 MMHG | WEIGHT: 152 LBS | BODY MASS INDEX: 25.95 KG/M2 | DIASTOLIC BLOOD PRESSURE: 78 MMHG

## 2023-07-07 DIAGNOSIS — I10 PRIMARY HYPERTENSION: ICD-10-CM

## 2023-07-07 DIAGNOSIS — I48.0 PAROXYSMAL ATRIAL FIBRILLATION (HCC): Primary | ICD-10-CM

## 2023-07-07 DIAGNOSIS — Z95.0 PACEMAKER: ICD-10-CM

## 2023-07-07 DIAGNOSIS — I25.10 ATHEROSCLEROSIS OF NATIVE CORONARY ARTERY OF NATIVE HEART WITHOUT ANGINA PECTORIS: ICD-10-CM

## 2023-07-07 DIAGNOSIS — E78.5 DYSLIPIDEMIA: ICD-10-CM

## 2023-07-19 ENCOUNTER — ANTI-COAG VISIT (OUTPATIENT)
Age: 83
End: 2023-07-19

## 2023-07-19 DIAGNOSIS — I48.0 PAROXYSMAL ATRIAL FIBRILLATION (HCC): ICD-10-CM

## 2023-07-19 DIAGNOSIS — Z79.01 LONG TERM (CURRENT) USE OF ANTICOAGULANTS: Primary | ICD-10-CM

## 2023-07-19 NOTE — PROGRESS NOTES
Patient called and states she started Cipro 500mg twice daily. I will have patient come in for INR tomorrow as I do not know where her level is.

## 2023-07-20 ENCOUNTER — ANTI-COAG VISIT (OUTPATIENT)
Age: 83
End: 2023-07-20
Payer: MEDICARE

## 2023-07-20 DIAGNOSIS — Z79.01 LONG TERM (CURRENT) USE OF ANTICOAGULANTS: Primary | ICD-10-CM

## 2023-07-20 DIAGNOSIS — I48.0 PAROXYSMAL ATRIAL FIBRILLATION (HCC): ICD-10-CM

## 2023-07-20 LAB
POC INR: 4.2
PROTHROMBIN TIME, POC: ABNORMAL

## 2023-07-20 PROCEDURE — 85610 PROTHROMBIN TIME: CPT | Performed by: INTERNAL MEDICINE

## 2023-07-26 ENCOUNTER — ANTI-COAG VISIT (OUTPATIENT)
Age: 83
End: 2023-07-26

## 2023-07-26 DIAGNOSIS — I48.0 PAROXYSMAL ATRIAL FIBRILLATION (HCC): ICD-10-CM

## 2023-07-26 DIAGNOSIS — Z79.01 LONG TERM (CURRENT) USE OF ANTICOAGULANTS: Primary | ICD-10-CM

## 2023-07-26 LAB
POC INR: 1.7
PROTHROMBIN TIME, POC: ABNORMAL

## 2023-07-26 NOTE — PATIENT INSTRUCTIONS
Reminder: Please contact the Coumadin Clinic at [phone] when you have medication changes. Examples, new medications, antibiotics, discontinued medications, new supplements, missed doses of warfarin or if you took extra doses of warfarin. This also includes OTC medications. Notifying us helps reduce the possibility of high and low INR's. In addition, if warfarin needs to be held for any procedures, please have surgeon or physician's office contact us before holding anticoagulant. Thanks, [facility] Cardiology Coumadin Clinic.

## 2023-07-26 NOTE — PROGRESS NOTES
Warfarin tablet strength and weekly dosing schedule confirmed today. Continue current maintenance plan (see Anticoag Dosing Calendar). INR to be rechecked in two week(s).

## 2023-08-08 PROCEDURE — 93296 REM INTERROG EVL PM/IDS: CPT | Performed by: INTERNAL MEDICINE

## 2023-08-08 PROCEDURE — 93294 REM INTERROG EVL PM/LDLS PM: CPT | Performed by: INTERNAL MEDICINE

## 2023-08-09 DIAGNOSIS — Z95.0 PACEMAKER: ICD-10-CM

## 2023-08-09 DIAGNOSIS — I48.0 PAROXYSMAL ATRIAL FIBRILLATION (HCC): ICD-10-CM

## 2023-08-11 ENCOUNTER — TELEPHONE (OUTPATIENT)
Age: 83
End: 2023-08-11

## 2023-08-15 ENCOUNTER — ANTI-COAG VISIT (OUTPATIENT)
Age: 83
End: 2023-08-15
Payer: MEDICARE

## 2023-08-15 DIAGNOSIS — I48.0 PAROXYSMAL ATRIAL FIBRILLATION (HCC): ICD-10-CM

## 2023-08-15 DIAGNOSIS — Z79.01 LONG TERM (CURRENT) USE OF ANTICOAGULANTS: Primary | ICD-10-CM

## 2023-08-15 LAB
POC INR: 2.8
PROTHROMBIN TIME, POC: NORMAL

## 2023-08-15 PROCEDURE — 85610 PROTHROMBIN TIME: CPT | Performed by: INTERNAL MEDICINE

## 2023-08-15 NOTE — PROGRESS NOTES
Warfarin tablet strength and weekly dosing schedule confirmed today. Continue current dose. Recheck INR in 2 weeks. No AVS given today. Appointment card given per patient.

## 2023-09-08 ENCOUNTER — TELEPHONE (OUTPATIENT)
Age: 83
End: 2023-09-08

## 2023-09-08 NOTE — TELEPHONE ENCOUNTER
Patient called stating she would like samples of the following :    pitavastatin (LIVALO) 4 MG TABS tablet      If available, patient would like to pick these up at the [id number] [facility] office. Please call and advise.

## 2023-09-25 ENCOUNTER — TELEPHONE (OUTPATIENT)
Age: 83
End: 2023-09-25

## 2023-10-01 ASSESSMENT — ENCOUNTER SYMPTOMS
SORE THROAT: 0
CONSTIPATION: 0
SHORTNESS OF BREATH: 0
PHOTOPHOBIA: 0
ABDOMINAL PAIN: 0
DIARRHEA: 0
ABDOMINAL DISTENTION: 0

## 2023-10-01 NOTE — PROGRESS NOTES
[facility] CARDIOLOGY  [id number] [place] [address]  PHONE: [phone]        NAME:  Carmelita Lynn  : 1940  MRN: [id number]     PCP:  Josue Tran MD      SUBJECTIVE:   Carmelita Lynn is a 78 y.o. female seen for a follow up visit regarding the following:     Chief Complaint   Patient presents with    Coronary Artery Disease    Atrial Fibrillation       HPI:  She presented recently to establish new cardiac care, previously cared for by Dr. Pillo Begum. Echo 2022:  Left Ventricle Normal left ventricular systolic function with a visually estimated EF of 55 - 60%. Left ventricle size is normal. Moderately increased wall thickness. Normal diastolic function. Left Atrium Left atrium size is normal.   Right Ventricle Right ventricle size is normal. Lead present in the right ventricle. Normal systolic function. Right Atrium Lead present in the right atrium. Right atrium size is normal.   Aortic Valve Tricuspid valve. Moderate sclerosis of the aortic valve cusp. Trace regurgitation. Mitral Valve Mildly thickened leaflet. Mild annular calcification of the mitral valve. Trace regurgitation. Tricuspid Valve Valve structure is normal. Mild regurgitation. The estimated RVSP is 33 mmHg. Pulmonic Valve The pulmonic valve was not well visualized. Aorta Normal sized ascending aorta. IVC/Hepatic Veins IVC diameter is less than or equal to 21 mm and decreases greater than 50% during inspiration; therefore the estimated right atrial pressure is normal (~3 mmHg). Pericardium No pericardial effusion. Pacer interrogations been stable but she was admitted with a severe pneumonia recently to [facility]. Labs were stable at that time an echo showed normal LV function and no significant valvular pathology. She has been on 2 rounds of antibiotics and presents today.   She is still weak and coughing with coarse bibasilar breath sounds but is clinically euvolemic

## 2023-10-02 ENCOUNTER — OFFICE VISIT (OUTPATIENT)
Age: 83
End: 2023-10-02
Payer: MEDICARE

## 2023-10-02 ENCOUNTER — ANTI-COAG VISIT (OUTPATIENT)
Age: 83
End: 2023-10-02
Payer: MEDICARE

## 2023-10-02 VITALS
HEIGHT: 64 IN | SYSTOLIC BLOOD PRESSURE: 122 MMHG | DIASTOLIC BLOOD PRESSURE: 62 MMHG | WEIGHT: 147.9 LBS | BODY MASS INDEX: 25.25 KG/M2 | HEART RATE: 76 BPM

## 2023-10-02 DIAGNOSIS — I10 PRIMARY HYPERTENSION: ICD-10-CM

## 2023-10-02 DIAGNOSIS — I48.0 PAROXYSMAL ATRIAL FIBRILLATION (HCC): ICD-10-CM

## 2023-10-02 DIAGNOSIS — I50.33 ACUTE ON CHRONIC DIASTOLIC (CONGESTIVE) HEART FAILURE (HCC): ICD-10-CM

## 2023-10-02 DIAGNOSIS — I35.1 AORTIC VALVE INSUFFICIENCY, ETIOLOGY OF CARDIAC VALVE DISEASE UNSPECIFIED: Primary | ICD-10-CM

## 2023-10-02 DIAGNOSIS — I47.19 ATRIAL TACHYCARDIA: ICD-10-CM

## 2023-10-02 DIAGNOSIS — Z95.0 PACEMAKER: ICD-10-CM

## 2023-10-02 DIAGNOSIS — Z79.01 LONG TERM (CURRENT) USE OF ANTICOAGULANTS: Primary | ICD-10-CM

## 2023-10-02 DIAGNOSIS — I25.10 ATHEROSCLEROSIS OF NATIVE CORONARY ARTERY OF NATIVE HEART WITHOUT ANGINA PECTORIS: ICD-10-CM

## 2023-10-02 LAB
ANION GAP SERPL CALC-SCNC: 7 MMOL/L (ref 2–11)
BUN SERPL-MCNC: 18 MG/DL (ref 8–23)
CALCIUM SERPL-MCNC: 9.2 MG/DL (ref 8.3–10.4)
CHLORIDE SERPL-SCNC: 112 MMOL/L (ref 101–110)
CO2 SERPL-SCNC: 27 MMOL/L (ref 21–32)
CREAT SERPL-MCNC: 0.8 MG/DL (ref 0.6–1)
GLUCOSE SERPL-MCNC: 97 MG/DL (ref 65–100)
MAGNESIUM SERPL-MCNC: 2.2 MG/DL (ref 1.8–2.4)
NT PRO BNP: 3376 PG/ML
POC INR: 2
POTASSIUM SERPL-SCNC: 3.2 MMOL/L (ref 3.5–5.1)
PROTHROMBIN TIME, POC: NORMAL
SODIUM SERPL-SCNC: 146 MMOL/L (ref 133–143)

## 2023-10-02 PROCEDURE — 99214 OFFICE O/P EST MOD 30 MIN: CPT | Performed by: INTERNAL MEDICINE

## 2023-10-02 PROCEDURE — G8484 FLU IMMUNIZE NO ADMIN: HCPCS | Performed by: INTERNAL MEDICINE

## 2023-10-02 PROCEDURE — 1090F PRES/ABSN URINE INCON ASSESS: CPT | Performed by: INTERNAL MEDICINE

## 2023-10-02 PROCEDURE — G8400 PT W/DXA NO RESULTS DOC: HCPCS | Performed by: INTERNAL MEDICINE

## 2023-10-02 PROCEDURE — 1036F TOBACCO NON-USER: CPT | Performed by: INTERNAL MEDICINE

## 2023-10-02 PROCEDURE — 3078F DIAST BP <80 MM HG: CPT | Performed by: INTERNAL MEDICINE

## 2023-10-02 PROCEDURE — G8427 DOCREV CUR MEDS BY ELIG CLIN: HCPCS | Performed by: INTERNAL MEDICINE

## 2023-10-02 PROCEDURE — 93000 ELECTROCARDIOGRAM COMPLETE: CPT | Performed by: INTERNAL MEDICINE

## 2023-10-02 PROCEDURE — 1123F ACP DISCUSS/DSCN MKR DOCD: CPT | Performed by: INTERNAL MEDICINE

## 2023-10-02 PROCEDURE — 85610 PROTHROMBIN TIME: CPT | Performed by: INTERNAL MEDICINE

## 2023-10-02 PROCEDURE — G8417 CALC BMI ABV UP PARAM F/U: HCPCS | Performed by: INTERNAL MEDICINE

## 2023-10-02 PROCEDURE — 3074F SYST BP LT 130 MM HG: CPT | Performed by: INTERNAL MEDICINE

## 2023-10-02 ASSESSMENT — ENCOUNTER SYMPTOMS: COUGH: 1

## 2023-10-02 NOTE — PATIENT INSTRUCTIONS
Reminder: Please contact the Coumadin Clinic at [phone] when you have medication changes. Examples, new medications, antibiotics, discontinued medications, new supplements, missed doses of warfarin or if you took extra doses of warfarin. This also includes OTC medications. Notifying us helps reduce the possibility of high and low INR's. In addition, if warfarin needs to be held for any procedures, please have surgeon or physician's office contact us before holding anticoagulant. Thanks, [facility] Cardiology Coumadin Clinic.

## 2023-10-02 NOTE — PROGRESS NOTES
Warfarin tablet strength and weekly dosing schedule confirmed today. Patient was discharged taking 2 mg daily, currently taking Mucinex and Robitussin DM liquid. Continue current maintenance plan (see Anticoag Dosing Calendar). INR to be rechecked in two week(s).

## 2023-10-03 ENCOUNTER — TELEPHONE (OUTPATIENT)
Age: 83
End: 2023-10-03

## 2023-10-03 DIAGNOSIS — I48.0 PAROXYSMAL ATRIAL FIBRILLATION (HCC): Primary | ICD-10-CM

## 2023-10-03 NOTE — TELEPHONE ENCOUNTER
----- Message from Alexandra León MD sent at 10/3/2023  7:39 AM EDT -----  BNP is elevated. Make sure an echo was ordered yesterday. Tell her to take Lasix every morning for 3 days in a row but give potassium chloride 20 mill equivalents daily, take twice daily for 2 days since her potassium is low and then daily thereafter, only on the days taking Lasix (to go together).   ----- Message -----  From: Gerard Patino [facility] [employer]  Sent: 10/2/2023  10:22 PM EDT  To: Alexandra León MD

## 2023-10-05 NOTE — TELEPHONE ENCOUNTER
Left detailed message for the pt with Dr. Ara Nam' response. Instructed her to call back with any questions.

## 2023-10-14 ASSESSMENT — ENCOUNTER SYMPTOMS
PHOTOPHOBIA: 0
COUGH: 1
SHORTNESS OF BREATH: 0
DIARRHEA: 0
SORE THROAT: 0
CONSTIPATION: 0
ABDOMINAL PAIN: 0
ABDOMINAL DISTENTION: 0

## 2023-10-14 NOTE — PROGRESS NOTES
[address] [facility]  [address] [place] [address]  PHONE: [phone]        NAME:  Shalonda Jolely  : 1940  MRN: [id number]     PCP:  Femi Tam MD      SUBJECTIVE:   Shalonda Jolley is a 78 y.o. female seen for a follow up visit regarding the following:     Chief Complaint   Patient presents with    Hypertension    Congestive Heart Failure    Coronary Artery Disease    Tachycardia    Atrial Fibrillation       HPI:  She presented recently to establish new cardiac care, previously cared for by Dr. Rachelle Miller. Echo 2022:  Left Ventricle Normal left ventricular systolic function with a visually estimated EF of 55 - 60%. Left ventricle size is normal. Moderately increased wall thickness. Normal diastolic function. Left Atrium Left atrium size is normal.   Right Ventricle Right ventricle size is normal. Lead present in the right ventricle. Normal systolic function. Right Atrium Lead present in the right atrium. Right atrium size is normal.   Aortic Valve Tricuspid valve. Moderate sclerosis of the aortic valve cusp. Trace regurgitation. Mitral Valve Mildly thickened leaflet. Mild annular calcification of the mitral valve. Trace regurgitation. Tricuspid Valve Valve structure is normal. Mild regurgitation. The estimated RVSP is 33 mmHg. Pulmonic Valve The pulmonic valve was not well visualized. Aorta Normal sized ascending aorta. IVC/Hepatic Veins IVC diameter is less than or equal to 21 mm and decreases greater than 50% during inspiration; therefore the estimated right atrial pressure is normal (~3 mmHg). Pericardium No pericardial effusion. Pacer interrogations been stable but she was admitted with a severe pneumonia recently to [facility]. Labs were stable at that time an echo showed normal LV function and no significant valvular pathology. She has been on 2 rounds of antibiotics.   Her ECG is abnormal however, with new lateral T wave

## 2023-10-16 ENCOUNTER — OFFICE VISIT (OUTPATIENT)
Age: 83
End: 2023-10-16
Payer: MEDICARE

## 2023-10-16 ENCOUNTER — ANTI-COAG VISIT (OUTPATIENT)
Age: 83
End: 2023-10-16
Payer: MEDICARE

## 2023-10-16 ENCOUNTER — NURSE ONLY (OUTPATIENT)
Age: 83
End: 2023-10-16

## 2023-10-16 VITALS
HEIGHT: 64 IN | HEART RATE: 76 BPM | DIASTOLIC BLOOD PRESSURE: 70 MMHG | BODY MASS INDEX: 25.8 KG/M2 | SYSTOLIC BLOOD PRESSURE: 126 MMHG | WEIGHT: 151.1 LBS

## 2023-10-16 DIAGNOSIS — I10 PRIMARY HYPERTENSION: ICD-10-CM

## 2023-10-16 DIAGNOSIS — Z95.0 PACEMAKER: ICD-10-CM

## 2023-10-16 DIAGNOSIS — I47.19 ATRIAL TACHYCARDIA: ICD-10-CM

## 2023-10-16 DIAGNOSIS — I48.0 PAROXYSMAL ATRIAL FIBRILLATION (HCC): Primary | ICD-10-CM

## 2023-10-16 DIAGNOSIS — I48.0 PAROXYSMAL ATRIAL FIBRILLATION (HCC): ICD-10-CM

## 2023-10-16 DIAGNOSIS — I25.10 ATHEROSCLEROSIS OF NATIVE CORONARY ARTERY OF NATIVE HEART WITHOUT ANGINA PECTORIS: ICD-10-CM

## 2023-10-16 DIAGNOSIS — Z79.01 LONG TERM (CURRENT) USE OF ANTICOAGULANTS: Primary | ICD-10-CM

## 2023-10-16 DIAGNOSIS — Z95.0 PACEMAKER: Primary | ICD-10-CM

## 2023-10-16 DIAGNOSIS — I44.2 COMPLETE HEART BLOCK (HCC): ICD-10-CM

## 2023-10-16 LAB
POC INR: 1.7
PROTHROMBIN TIME, POC: ABNORMAL

## 2023-10-16 PROCEDURE — 3074F SYST BP LT 130 MM HG: CPT | Performed by: INTERNAL MEDICINE

## 2023-10-16 PROCEDURE — 99214 OFFICE O/P EST MOD 30 MIN: CPT | Performed by: INTERNAL MEDICINE

## 2023-10-16 PROCEDURE — 1036F TOBACCO NON-USER: CPT | Performed by: INTERNAL MEDICINE

## 2023-10-16 PROCEDURE — 1090F PRES/ABSN URINE INCON ASSESS: CPT | Performed by: INTERNAL MEDICINE

## 2023-10-16 PROCEDURE — G8484 FLU IMMUNIZE NO ADMIN: HCPCS | Performed by: INTERNAL MEDICINE

## 2023-10-16 PROCEDURE — 3078F DIAST BP <80 MM HG: CPT | Performed by: INTERNAL MEDICINE

## 2023-10-16 PROCEDURE — 1123F ACP DISCUSS/DSCN MKR DOCD: CPT | Performed by: INTERNAL MEDICINE

## 2023-10-16 PROCEDURE — 85610 PROTHROMBIN TIME: CPT | Performed by: INTERNAL MEDICINE

## 2023-10-16 PROCEDURE — G8417 CALC BMI ABV UP PARAM F/U: HCPCS | Performed by: INTERNAL MEDICINE

## 2023-10-16 PROCEDURE — G8427 DOCREV CUR MEDS BY ELIG CLIN: HCPCS | Performed by: INTERNAL MEDICINE

## 2023-10-16 PROCEDURE — 93000 ELECTROCARDIOGRAM COMPLETE: CPT | Performed by: INTERNAL MEDICINE

## 2023-10-16 PROCEDURE — G8400 PT W/DXA NO RESULTS DOC: HCPCS | Performed by: INTERNAL MEDICINE

## 2023-10-16 RX ORDER — METOPROLOL SUCCINATE 25 MG/1
25 TABLET, EXTENDED RELEASE ORAL DAILY
Qty: 30 TABLET | Refills: 11 | Status: SHIPPED | OUTPATIENT
Start: 2023-10-16 | End: 2023-10-17 | Stop reason: SDUPTHER

## 2023-10-16 NOTE — PROGRESS NOTES
Warfarin tablet strength and weekly dosing schedule confirmed today. Patient was discharged on 2 mg daily, INR has slowly transitioned to being subtherapeutic. Patient switched back to her 5 mg tablets, to take 2.5 mg daily and recheck INR in one week.

## 2023-10-16 NOTE — PATIENT INSTRUCTIONS
Reminder: Please contact the Coumadin Clinic at [phone] when you have medication changes. Examples, new medications, antibiotics, discontinued medications, new supplements, missed doses of warfarin or if you took extra doses of warfarin. This also includes OTC medications. Notifying us helps reduce the possibility of high and low INR's. In addition, if warfarin needs to be held for any procedures, please have surgeon or physician's office contact us before holding anticoagulant. Thanks, [facility] Cardiology Coumadin Clinic.

## 2023-10-17 RX ORDER — NITROGLYCERIN 0.4 MG/1
0.4 TABLET SUBLINGUAL EVERY 5 MIN PRN
Qty: 25 TABLET | Refills: 3 | Status: SHIPPED | OUTPATIENT
Start: 2023-10-17

## 2023-10-17 RX ORDER — METOPROLOL SUCCINATE 25 MG/1
25 TABLET, EXTENDED RELEASE ORAL DAILY
Qty: 30 TABLET | Refills: 11 | Status: SHIPPED | OUTPATIENT
Start: 2023-10-17

## 2023-10-17 NOTE — TELEPHONE ENCOUNTER
Requested Prescriptions     Pending Prescriptions Disp Refills    metoprolol succinate (TOPROL XL) 25 MG extended release tablet 30 tablet 11     Sig: Take 1 tablet by mouth daily    nitroGLYCERIN (NITROSTAT) 0.4 MG SL tablet 25 tablet 3     Sig: Place 1 tablet under the tongue every 5 minutes as needed for Chest pain

## 2023-10-17 NOTE — TELEPHONE ENCOUNTER
Pt need her nitroglycerine and Toprol called in pt states this was discussed durring her appt yesterday and states the rx was never called in      Please call the pt

## 2023-10-23 ENCOUNTER — TELEPHONE (OUTPATIENT)
Age: 83
End: 2023-10-23

## 2023-10-23 ENCOUNTER — ANTI-COAG VISIT (OUTPATIENT)
Age: 83
End: 2023-10-23
Payer: MEDICARE

## 2023-10-23 ENCOUNTER — NURSE ONLY (OUTPATIENT)
Age: 83
End: 2023-10-23

## 2023-10-23 DIAGNOSIS — I44.2 COMPLETE HEART BLOCK (HCC): Primary | ICD-10-CM

## 2023-10-23 DIAGNOSIS — I48.0 PAROXYSMAL ATRIAL FIBRILLATION (HCC): ICD-10-CM

## 2023-10-23 DIAGNOSIS — Z79.01 LONG TERM (CURRENT) USE OF ANTICOAGULANTS: ICD-10-CM

## 2023-10-23 LAB
POC INR: 2.4
PROTHROMBIN TIME, POC: NORMAL

## 2023-10-23 PROCEDURE — 85610 PROTHROMBIN TIME: CPT | Performed by: INTERNAL MEDICINE

## 2023-10-23 PROCEDURE — 93793 ANTICOAG MGMT PT WARFARIN: CPT | Performed by: INTERNAL MEDICINE

## 2023-10-23 RX ORDER — METOPROLOL SUCCINATE 25 MG/1
25 TABLET, EXTENDED RELEASE ORAL 2 TIMES DAILY
Qty: 60 TABLET | Refills: 3 | Status: SHIPPED | OUTPATIENT
Start: 2023-10-23

## 2023-10-23 RX ORDER — LOSARTAN POTASSIUM AND HYDROCHLOROTHIAZIDE 12.5; 5 MG/1; MG/1
0.5 TABLET ORAL DAILY
Qty: 30 TABLET | Refills: 3 | Status: SHIPPED
Start: 2023-10-23

## 2023-10-23 NOTE — TELEPHONE ENCOUNTER
Make sure the patient is taking sotalol twice daily  Increase Toprol-XL to twice daily  Cut Losartan HCT dose in half and take only 1/2pill daily.

## 2023-10-23 NOTE — PATIENT INSTRUCTIONS
Reminder: Please contact the Coumadin Clinic at [phone] when you have medication changes. Examples, new medications, antibiotics, discontinued medications, new supplements, missed doses of warfarin or if you took extra doses of warfarin. This also includes OTC medications. Notifying us helps reduce the possibility of high and low INR's. In addition, if warfarin needs to be held for any procedures, please have surgeon or physician's office contact us before holding anticoagulant. Thanks, [facility] Cardiology Coumadin Clinic.

## 2023-10-23 NOTE — TELEPHONE ENCOUNTER
DATE TIME BP      HR   Thur 10/19 8:03 /79  75   Thur 10/9 6:38 PM 79/60   101   Fri 10/20 8:29 /79  75   Sat 10/21 7:49 /79  94   Sat 10/21 3:47PM 109/73  91   Sat 10/21  3:49 /75 91   Sat 10/21 7:38 PM 94/73  94   Sun 10/22 7:47 /87  75   Sun 10/22 7:51 /98  100   Sun 10/22 7:51 /90  108   Sun 10/22 8:29 /77  75    Sun 10/22 12:08 /86  98   Sun 10/22 12:13 /67  75   Mon 10/23 8:49 /77  96     Patient reported BP readings since mode adjustment and starting Toprol XL 25mg daily. Ongoing elevated HR and palpitations since being mode switched to DDD due to being in [place] when [facility]DI. Presenting AsVp Rate 100 today in office. Please advise on medication adjustments.

## 2023-10-23 NOTE — TELEPHONE ENCOUNTER
Reviewed medication recommendations from Dr. Hong Arnold with patient. She reports taking the sotatol as directed. Med list updated. She will call back in 1 week with an updated. All questions and concerns addressed. She will call sooner with any additional questions.

## 2023-10-23 NOTE — TELEPHONE ENCOUNTER
Pt states that she was put on RX Metoprolol and needs to speak with someone regarding the medication. Pt also had a issue with her device over the weekend but device was alerted. Pt would appreciate a call back.

## 2023-11-01 ENCOUNTER — TELEPHONE (OUTPATIENT)
Age: 83
End: 2023-11-01

## 2023-11-01 DIAGNOSIS — I47.19 ATRIAL TACHYCARDIA: ICD-10-CM

## 2023-11-01 DIAGNOSIS — I48.0 PAROXYSMAL ATRIAL FIBRILLATION (HCC): ICD-10-CM

## 2023-11-01 DIAGNOSIS — Z95.0 PACEMAKER: Primary | ICD-10-CM

## 2023-11-01 NOTE — TELEPHONE ENCOUNTER
Place a 3-day Holter please. If she has not had a basic metabolic panel, magnesium, and thyroid in the last 2 to 3 weeks, recheck that as well.

## 2023-11-01 NOTE — TELEPHONE ENCOUNTER
Pt called and stated that her med change still isnt working and that she is currently still having fluttering feeling on left side even after her med increase. Pt states slight SOB and denies chest pains. Pt would appreciate a call back.

## 2023-11-01 NOTE — TELEPHONE ENCOUNTER
Pt.is taking Toprol xl 25mg bid and Sotalol 120mg bid  and Hyzaar 50/12.5mg bid. Still having fluttering in chest.BP is good 108/69,123/80,94/66,99/69,117/85 HR is running around . Seems to her the meds are not working like they should. Will send to [address] [place] [address] [place] for advice. Da Roberts RN  [facility] Cardiology Triage 2 minutes ago (12:56 PM)     [address] [place] [address] home monitor indicates average atrial HR 92 in the last 24 hours. No episodes. Leads stable.

## 2023-11-02 NOTE — TELEPHONE ENCOUNTER
Pt.notified of MD response and appt. made for holter placement Monday in [address] when she goes in for her ECHO.

## 2023-11-08 PROCEDURE — 93296 REM INTERROG EVL PM/IDS: CPT | Performed by: INTERNAL MEDICINE

## 2023-11-08 PROCEDURE — 93294 REM INTERROG EVL PM/LDLS PM: CPT | Performed by: INTERNAL MEDICINE

## 2023-11-10 ENCOUNTER — TELEPHONE (OUTPATIENT)
Age: 83
End: 2023-11-10

## 2023-11-20 ENCOUNTER — TELEPHONE (OUTPATIENT)
Age: 83
End: 2023-11-20

## 2023-11-20 ENCOUNTER — ANTI-COAG VISIT (OUTPATIENT)
Age: 83
End: 2023-11-20
Payer: MEDICARE

## 2023-11-20 DIAGNOSIS — Z79.01 LONG TERM (CURRENT) USE OF ANTICOAGULANTS: Primary | ICD-10-CM

## 2023-11-20 DIAGNOSIS — I48.0 PAROXYSMAL ATRIAL FIBRILLATION (HCC): ICD-10-CM

## 2023-11-20 LAB
POC INR: 1.3
PROTHROMBIN TIME, POC: ABNORMAL

## 2023-11-20 PROCEDURE — 85610 PROTHROMBIN TIME: CPT | Performed by: INTERNAL MEDICINE

## 2023-11-20 PROCEDURE — 93793 ANTICOAG MGMT PT WARFARIN: CPT | Performed by: INTERNAL MEDICINE

## 2023-11-20 NOTE — PATIENT INSTRUCTIONS
Reminder: Please contact the Coumadin Clinic at [phone] when you have medication changes. Examples, new medications, antibiotics, discontinued medications, new supplements, missed doses of warfarin or if you took extra doses of warfarin. This also includes OTC medications. Notifying us helps reduce the possibility of high and low INR's. In addition, if warfarin needs to be held for any procedures, please have surgeon or physician's office contact us before holding anticoagulant. Thanks, [facility] Cardiology Coumadin Clinic.

## 2023-11-20 NOTE — TELEPHONE ENCOUNTER
Try to increase to 60 ounces a day and this may help. If heart rate remains rapid we may need to add medications but she is not having any arrhythmias on the pacemaker. She needs to sit and rest and relax before checking any vitals for at least for 5 minutes.

## 2023-11-20 NOTE — TELEPHONE ENCOUNTER
Patient seen today for INR check and is requesting to speak with Dr. Cecily Pritchard nurse. Patient stated she is still having HR in 100's 50% of the time and it makes her not feel well when this happens. Patient is wanting to know what can be done about this. Patient also had a 3 day Zio monitor placed and is wanting to know the results. Checked [employer] and results are still being processed. Patient can be reached at [phone].

## 2023-11-20 NOTE — TELEPHONE ENCOUNTER
[employer] transmission from this morning indicates avg heart rate 80. No episodes. Leads stable. Normal device function.

## 2023-11-20 NOTE — TELEPHONE ENCOUNTER
Pt states she drinks between 40-50oz water/day as directed by Dr. Anali Taylor. Asking what Dr. Arslan Carrasco recommends.

## 2023-11-20 NOTE — TELEPHONE ENCOUNTER
Pt c/o rapid heart beat and feeling badly with it. , 106, 109. /68, 135/72 133/82, 139/75, 146/89, 134/79, 96/57, 90/57, 127/80  Yesterday she had tightness in her chest. Eased off after 45 min. Asking if anything is showing on her pacemaker.

## 2023-11-21 NOTE — TELEPHONE ENCOUNTER
Triage informed pt of Dr. Zahida Quezada' response. Asking for results of monitor. Triage informed pt of Dr. Zahida Quezada' response on monitor report. She verbalizes understanding to all. Triage moved up pt's f/u appt to 11/29/23 at 2:15 with Dr. Zahida Quezada. Pt confirms appt date, time, and location.

## 2023-11-28 ASSESSMENT — ENCOUNTER SYMPTOMS
DIARRHEA: 0
SORE THROAT: 0
CONSTIPATION: 0
ABDOMINAL PAIN: 0
SHORTNESS OF BREATH: 0
PHOTOPHOBIA: 0
COUGH: 1
ABDOMINAL DISTENTION: 0

## 2023-11-28 NOTE — PROGRESS NOTES
Problem Relation Age of Onset    Breast Cancer Neg Hx     Heart Disease Mother     Heart Disease Brother     Heart Disease Brother         Social History     Tobacco Use    Smoking status: Former     Types: Cigarettes     Quit date: 10/29/1985     Years since quittin.1     Passive exposure: Past    Smokeless tobacco: Never   Substance Use Topics    Alcohol use: No       ROS:    Review of Systems   Constitutional:  Positive for fatigue. Negative for appetite change, chills and diaphoresis. HENT:  Negative for congestion, mouth sores, nosebleeds, sore throat and tinnitus. Eyes:  Negative for photophobia and visual disturbance. Respiratory:  Positive for cough. Negative for shortness of breath. Cardiovascular:  Positive for palpitations. Negative for chest pain and leg swelling. Gastrointestinal:  Negative for abdominal distention, abdominal pain, constipation and diarrhea. Endocrine: Negative for cold intolerance, heat intolerance, polydipsia and polyuria. Genitourinary:  Negative for dysuria and hematuria. Musculoskeletal:  Negative for arthralgias, joint swelling and myalgias. Skin:  Negative for rash. Allergic/Immunologic: Negative for environmental allergies and food allergies. Neurological:  Negative for dizziness, seizures, syncope and light-headedness. Hematological:  Negative for adenopathy. Does not bruise/bleed easily. Psychiatric/Behavioral:  Negative for agitation, behavioral problems, dysphoric mood and hallucinations. The patient is not nervous/anxious.          PHYSICAL EXAM:     Vitals:    23 1421   BP: 138/88   Pulse: (!) 107   Weight: 67.6 kg (149 lb)   Height: 1.651 m (5' 5\")          Wt Readings from Last 3 Encounters:   23 67.6 kg (149 lb)   23 68.5 kg (151 lb)   10/16/23 68.5 kg (151 lb 1.6 oz)      BP Readings from Last 3 Encounters:   23 138/88   23 126/70   10/16/23 126/70        Physical Exam  Constitutional:       Appearance:

## 2023-11-29 ENCOUNTER — ANTI-COAG VISIT (OUTPATIENT)
Age: 83
End: 2023-11-29
Payer: MEDICARE

## 2023-11-29 ENCOUNTER — NURSE ONLY (OUTPATIENT)
Age: 83
End: 2023-11-29

## 2023-11-29 ENCOUNTER — OFFICE VISIT (OUTPATIENT)
Age: 83
End: 2023-11-29

## 2023-11-29 VITALS
SYSTOLIC BLOOD PRESSURE: 138 MMHG | DIASTOLIC BLOOD PRESSURE: 88 MMHG | HEART RATE: 107 BPM | BODY MASS INDEX: 24.83 KG/M2 | HEIGHT: 65 IN | WEIGHT: 149 LBS

## 2023-11-29 DIAGNOSIS — I50.9 CONGESTIVE HEART FAILURE (CHF) (HCC): ICD-10-CM

## 2023-11-29 DIAGNOSIS — I48.0 PAROXYSMAL ATRIAL FIBRILLATION (HCC): ICD-10-CM

## 2023-11-29 DIAGNOSIS — I35.1 AORTIC VALVE INSUFFICIENCY, ETIOLOGY OF CARDIAC VALVE DISEASE UNSPECIFIED: Primary | ICD-10-CM

## 2023-11-29 DIAGNOSIS — I48.0 PAROXYSMAL ATRIAL FIBRILLATION (HCC): Primary | ICD-10-CM

## 2023-11-29 DIAGNOSIS — I10 PRIMARY HYPERTENSION: ICD-10-CM

## 2023-11-29 DIAGNOSIS — Z79.01 LONG TERM (CURRENT) USE OF ANTICOAGULANTS: Primary | ICD-10-CM

## 2023-11-29 DIAGNOSIS — I47.19 ATRIAL TACHYCARDIA: ICD-10-CM

## 2023-11-29 DIAGNOSIS — I25.10 ATHEROSCLEROSIS OF NATIVE CORONARY ARTERY OF NATIVE HEART WITHOUT ANGINA PECTORIS: ICD-10-CM

## 2023-11-29 LAB
POC INR: 1.6
PROTHROMBIN TIME, POC: ABNORMAL

## 2023-11-29 PROCEDURE — 93793 ANTICOAG MGMT PT WARFARIN: CPT | Performed by: INTERNAL MEDICINE

## 2023-11-29 PROCEDURE — 85610 PROTHROMBIN TIME: CPT | Performed by: INTERNAL MEDICINE

## 2023-11-29 RX ORDER — LEVOTHYROXINE SODIUM 137 UG/1
TABLET ORAL
COMMUNITY
Start: 2023-10-18

## 2023-11-29 RX ORDER — DOXYCYCLINE HYCLATE 100 MG
TABLET ORAL
COMMUNITY
Start: 2023-11-01

## 2023-11-29 RX ORDER — LOSARTAN POTASSIUM AND HYDROCHLOROTHIAZIDE 12.5; 5 MG/1; MG/1
0.5 TABLET ORAL DAILY
Qty: 90 TABLET | Refills: 1 | Status: SHIPPED | OUTPATIENT
Start: 2023-11-29

## 2023-11-29 RX ORDER — METOPROLOL SUCCINATE 25 MG/1
25 TABLET, EXTENDED RELEASE ORAL 2 TIMES DAILY
Qty: 180 TABLET | Refills: 3 | Status: SHIPPED | OUTPATIENT
Start: 2023-11-29

## 2023-11-29 NOTE — PATIENT INSTRUCTIONS
Reminder: Please contact the Coumadin Clinic at [phone] when you have medication changes. Examples, new medications, antibiotics, discontinued medications, new supplements, missed doses of warfarin or if you took extra doses of warfarin. This also includes OTC medications. Notifying us helps reduce the possibility of high and low INR's. In addition, if warfarin needs to be held for any procedures, please have surgeon or physician's office contact us before holding anticoagulant. Thanks, [facility] Cardiology Coumadin Clinic.

## 2023-12-04 ENCOUNTER — TELEPHONE (OUTPATIENT)
Age: 83
End: 2023-12-04

## 2023-12-04 NOTE — TELEPHONE ENCOUNTER
Patient called stating she is experiencing the following issues :    Dr Delia Simmons made pace maker adjustment and wanted to know the results  Feeling much better. Recorded the following VS    12/1 AM HF=269/86  75    12/1 PM JU=014/83  75    12/2 AM PH=148/93  75    12/3  AM AV=562/73  75    12/3 PM BP=99/73    75    Patient falls asleep if she sits down for extended periods of time.

## 2023-12-04 NOTE — TELEPHONE ENCOUNTER
Triage informed pt will notify Dr. Tracie Valadez 12/5/23 when he returns to the office. Pt states she is willing to stay on metoprolol if this is helping her.

## 2023-12-05 NOTE — TELEPHONE ENCOUNTER
Triage informed pt of Dr. Arslan Carrasco' response. States she is taking metoprolol succinate 25mg bid and that Dr. Arslan Carrasco increased the dose to bid because she was still having high HR on once daily dosing. Now HR are much better and she states her fatigue is not severe. She verbalizes understanding and agrees to plan. She will call back if needed.

## 2023-12-05 NOTE — TELEPHONE ENCOUNTER
Make sure she is taking the metoprolol at bedtime  It usually takes a couple of weeks to develop somewhat of a tolerance.   Try to give it a couple of weeks, especially if this is helping with her resting heart rate  If still severely fatigued and tired after 7 to 10 days of therapy, okay to cut the metoprolol in half

## 2023-12-12 ENCOUNTER — ANTI-COAG VISIT (OUTPATIENT)
Age: 83
End: 2023-12-12
Payer: MEDICARE

## 2023-12-12 DIAGNOSIS — I48.0 PAROXYSMAL ATRIAL FIBRILLATION (HCC): ICD-10-CM

## 2023-12-12 DIAGNOSIS — Z79.01 LONG TERM (CURRENT) USE OF ANTICOAGULANTS: Primary | ICD-10-CM

## 2023-12-12 LAB
POC INR: 2.3
PROTHROMBIN TIME, POC: NORMAL

## 2023-12-12 PROCEDURE — 93793 ANTICOAG MGMT PT WARFARIN: CPT | Performed by: INTERNAL MEDICINE

## 2023-12-12 PROCEDURE — 85610 PROTHROMBIN TIME: CPT | Performed by: INTERNAL MEDICINE

## 2024-01-03 ENCOUNTER — PREP FOR PROCEDURE (OUTPATIENT)
Dept: ADMINISTRATIVE | Age: 84
End: 2024-01-03

## 2024-01-04 ENCOUNTER — ANESTHESIA (OUTPATIENT)
Dept: SURGERY | Age: 84
End: 2024-01-04
Payer: MEDICARE

## 2024-01-04 ENCOUNTER — ANESTHESIA EVENT (OUTPATIENT)
Dept: SURGERY | Age: 84
End: 2024-01-04
Payer: MEDICARE

## 2024-01-04 ENCOUNTER — HOSPITAL ENCOUNTER (OUTPATIENT)
Age: 84
Setting detail: OUTPATIENT SURGERY
Discharge: HOME | End: 2024-01-04
Attending: OPHTHALMOLOGY | Admitting: OPHTHALMOLOGY
Payer: MEDICARE

## 2024-01-04 VITALS
BODY MASS INDEX: 24.79 KG/M2 | TEMPERATURE: 98 F | DIASTOLIC BLOOD PRESSURE: 63 MMHG | RESPIRATION RATE: 15 BRPM | WEIGHT: 149 LBS | HEART RATE: 75 BPM | SYSTOLIC BLOOD PRESSURE: 131 MMHG | OXYGEN SATURATION: 94 %

## 2024-01-04 LAB
INR BLD: 1.4 (ref 0.9–1.2)
POTASSIUM BLD-SCNC: 3.8 MMOL/L (ref 3.5–5.1)
PT BLD: 16.1 SECS (ref 9.6–11.6)

## 2024-01-04 PROCEDURE — 2709999900 HC NON-CHARGEABLE SUPPLY: Performed by: OPHTHALMOLOGY

## 2024-01-04 PROCEDURE — 85610 PROTHROMBIN TIME: CPT

## 2024-01-04 PROCEDURE — 7100000000 HC PACU RECOVERY - FIRST 15 MIN: Performed by: OPHTHALMOLOGY

## 2024-01-04 PROCEDURE — 7100000001 HC PACU RECOVERY - ADDTL 15 MIN: Performed by: OPHTHALMOLOGY

## 2024-01-04 PROCEDURE — 6370000000 HC RX 637 (ALT 250 FOR IP): Performed by: OPHTHALMOLOGY

## 2024-01-04 PROCEDURE — 84132 ASSAY OF SERUM POTASSIUM: CPT

## 2024-01-04 PROCEDURE — 6370000000 HC RX 637 (ALT 250 FOR IP): Performed by: STUDENT IN AN ORGANIZED HEALTH CARE EDUCATION/TRAINING PROGRAM

## 2024-01-04 PROCEDURE — 6360000002 HC RX W HCPCS: Performed by: NURSE ANESTHETIST, CERTIFIED REGISTERED

## 2024-01-04 PROCEDURE — 7100000010 HC PHASE II RECOVERY - FIRST 15 MIN: Performed by: OPHTHALMOLOGY

## 2024-01-04 PROCEDURE — 3700000000 HC ANESTHESIA ATTENDED CARE: Performed by: OPHTHALMOLOGY

## 2024-01-04 PROCEDURE — 3700000001 HC ADD 15 MINUTES (ANESTHESIA): Performed by: OPHTHALMOLOGY

## 2024-01-04 PROCEDURE — 2580000003 HC RX 258: Performed by: NURSE ANESTHETIST, CERTIFIED REGISTERED

## 2024-01-04 PROCEDURE — 6360000002 HC RX W HCPCS: Performed by: OPHTHALMOLOGY

## 2024-01-04 PROCEDURE — 7100000011 HC PHASE II RECOVERY - ADDTL 15 MIN: Performed by: OPHTHALMOLOGY

## 2024-01-04 PROCEDURE — 2500000003 HC RX 250 WO HCPCS: Performed by: OPHTHALMOLOGY

## 2024-01-04 PROCEDURE — 3600000004 HC SURGERY LEVEL 4 BASE: Performed by: OPHTHALMOLOGY

## 2024-01-04 PROCEDURE — 2720000010 HC SURG SUPPLY STERILE: Performed by: OPHTHALMOLOGY

## 2024-01-04 PROCEDURE — 3600000014 HC SURGERY LEVEL 4 ADDTL 15MIN: Performed by: OPHTHALMOLOGY

## 2024-01-04 RX ORDER — ONDANSETRON 2 MG/ML
INJECTION INTRAMUSCULAR; INTRAVENOUS PRN
Status: DISCONTINUED | OUTPATIENT
Start: 2024-01-04 | End: 2024-01-04 | Stop reason: SDUPTHER

## 2024-01-04 RX ORDER — PROPOFOL 10 MG/ML
INJECTION, EMULSION INTRAVENOUS PRN
Status: DISCONTINUED | OUTPATIENT
Start: 2024-01-04 | End: 2024-01-04 | Stop reason: SDUPTHER

## 2024-01-04 RX ORDER — OXYCODONE HYDROCHLORIDE 5 MG/1
5 TABLET ORAL PRN
OUTPATIENT
Start: 2024-01-04 | End: 2024-01-04

## 2024-01-04 RX ORDER — LIDOCAINE HYDROCHLORIDE 20 MG/ML
INJECTION, SOLUTION INFILTRATION; PERINEURAL PRN
Status: DISCONTINUED | OUTPATIENT
Start: 2024-01-04 | End: 2024-01-04 | Stop reason: ALTCHOICE

## 2024-01-04 RX ORDER — CYCLOPENTOLATE HYDROCHLORIDE 20 MG/ML
1 SOLUTION/ DROPS OPHTHALMIC
Status: CANCELLED | OUTPATIENT
Start: 2024-01-04 | End: 2024-01-04

## 2024-01-04 RX ORDER — CYCLOPENTOLATE HYDROCHLORIDE 20 MG/ML
1 SOLUTION/ DROPS OPHTHALMIC
Status: COMPLETED | OUTPATIENT
Start: 2024-01-04 | End: 2024-01-04

## 2024-01-04 RX ORDER — HALOPERIDOL 5 MG/ML
1 INJECTION INTRAMUSCULAR
OUTPATIENT
Start: 2024-01-04 | End: 2024-01-05

## 2024-01-04 RX ORDER — SODIUM CHLORIDE, POTASSIUM CHLORIDE, CALCIUM CHLORIDE, MAGNESIUM CHLORIDE, SODIUM ACETATE, AND SODIUM CITRATE 6.4; .75; .48; .3; 3.9; 1.7 MG/ML; MG/ML; MG/ML; MG/ML; MG/ML; MG/ML
SOLUTION IRRIGATION PRN
Status: DISCONTINUED | OUTPATIENT
Start: 2024-01-04 | End: 2024-01-04 | Stop reason: ALTCHOICE

## 2024-01-04 RX ORDER — BUPIVACAINE HYDROCHLORIDE 7.5 MG/ML
INJECTION, SOLUTION EPIDURAL; RETROBULBAR PRN
Status: DISCONTINUED | OUTPATIENT
Start: 2024-01-04 | End: 2024-01-04 | Stop reason: ALTCHOICE

## 2024-01-04 RX ORDER — TROPICAMIDE 10 MG/ML
1 SOLUTION/ DROPS OPHTHALMIC
Status: DISPENSED | OUTPATIENT
Start: 2024-01-04 | End: 2024-01-04

## 2024-01-04 RX ORDER — MIDAZOLAM HYDROCHLORIDE 1 MG/ML
INJECTION INTRAMUSCULAR; INTRAVENOUS PRN
Status: DISCONTINUED | OUTPATIENT
Start: 2024-01-04 | End: 2024-01-04 | Stop reason: SDUPTHER

## 2024-01-04 RX ORDER — TETRACAINE HYDROCHLORIDE 5 MG/ML
SOLUTION OPHTHALMIC PRN
Status: DISCONTINUED | OUTPATIENT
Start: 2024-01-04 | End: 2024-01-04 | Stop reason: ALTCHOICE

## 2024-01-04 RX ORDER — LABETALOL HYDROCHLORIDE 5 MG/ML
10 INJECTION, SOLUTION INTRAVENOUS
OUTPATIENT
Start: 2024-01-04

## 2024-01-04 RX ORDER — TROPICAMIDE 10 MG/ML
1 SOLUTION/ DROPS OPHTHALMIC
Status: COMPLETED | OUTPATIENT
Start: 2024-01-04 | End: 2024-01-04

## 2024-01-04 RX ORDER — CEFAZOLIN SODIUM 1 G/3ML
INJECTION, POWDER, FOR SOLUTION INTRAMUSCULAR; INTRAVENOUS PRN
Status: DISCONTINUED | OUTPATIENT
Start: 2024-01-04 | End: 2024-01-04 | Stop reason: ALTCHOICE

## 2024-01-04 RX ORDER — ERYTHROMYCIN 5 MG/G
OINTMENT OPHTHALMIC PRN
Status: DISCONTINUED | OUTPATIENT
Start: 2024-01-04 | End: 2024-01-04 | Stop reason: ALTCHOICE

## 2024-01-04 RX ORDER — TROPICAMIDE 10 MG/ML
1 SOLUTION/ DROPS OPHTHALMIC
Status: CANCELLED | OUTPATIENT
Start: 2024-01-04 | End: 2024-01-04

## 2024-01-04 RX ORDER — METOPROLOL SUCCINATE 25 MG/1
25 TABLET, EXTENDED RELEASE ORAL
Status: COMPLETED | OUTPATIENT
Start: 2024-01-04 | End: 2024-01-04

## 2024-01-04 RX ORDER — IPRATROPIUM BROMIDE AND ALBUTEROL SULFATE 2.5; .5 MG/3ML; MG/3ML
1 SOLUTION RESPIRATORY (INHALATION)
OUTPATIENT
Start: 2024-01-04 | End: 2024-01-05

## 2024-01-04 RX ORDER — PHENYLEPHRINE HYDROCHLORIDE 25 MG/ML
1 SOLUTION/ DROPS OPHTHALMIC
Status: COMPLETED | OUTPATIENT
Start: 2024-01-04 | End: 2024-01-04

## 2024-01-04 RX ORDER — DIPHENHYDRAMINE HYDROCHLORIDE 50 MG/ML
12.5 INJECTION INTRAMUSCULAR; INTRAVENOUS
OUTPATIENT
Start: 2024-01-04 | End: 2024-01-05

## 2024-01-04 RX ORDER — SODIUM CHLORIDE, SODIUM LACTATE, POTASSIUM CHLORIDE, CALCIUM CHLORIDE 600; 310; 30; 20 MG/100ML; MG/100ML; MG/100ML; MG/100ML
INJECTION, SOLUTION INTRAVENOUS CONTINUOUS PRN
Status: DISCONTINUED | OUTPATIENT
Start: 2024-01-04 | End: 2024-01-04 | Stop reason: SDUPTHER

## 2024-01-04 RX ORDER — OXYCODONE HYDROCHLORIDE 5 MG/1
10 TABLET ORAL PRN
OUTPATIENT
Start: 2024-01-04 | End: 2024-01-04

## 2024-01-04 RX ORDER — HYDRALAZINE HYDROCHLORIDE 20 MG/ML
10 INJECTION INTRAMUSCULAR; INTRAVENOUS
OUTPATIENT
Start: 2024-01-04

## 2024-01-04 RX ORDER — PHENYLEPHRINE HYDROCHLORIDE 25 MG/ML
1 SOLUTION/ DROPS OPHTHALMIC
Status: DISPENSED | OUTPATIENT
Start: 2024-01-04 | End: 2024-01-04

## 2024-01-04 RX ORDER — PROCHLORPERAZINE EDISYLATE 5 MG/ML
5 INJECTION INTRAMUSCULAR; INTRAVENOUS
OUTPATIENT
Start: 2024-01-04 | End: 2024-01-05

## 2024-01-04 RX ORDER — PHENYLEPHRINE HYDROCHLORIDE 25 MG/ML
1 SOLUTION/ DROPS OPHTHALMIC
Status: CANCELLED | OUTPATIENT
Start: 2024-01-04 | End: 2024-01-04

## 2024-01-04 RX ORDER — CYCLOPENTOLATE HYDROCHLORIDE 20 MG/ML
1 SOLUTION/ DROPS OPHTHALMIC
Status: DISPENSED | OUTPATIENT
Start: 2024-01-04 | End: 2024-01-04

## 2024-01-04 RX ORDER — BETAMETHASONE SODIUM PHOSPHATE AND BETAMETHASONE ACETATE 3; 3 MG/ML; MG/ML
INJECTION, SUSPENSION INTRA-ARTICULAR; INTRALESIONAL; INTRAMUSCULAR; SOFT TISSUE PRN
Status: DISCONTINUED | OUTPATIENT
Start: 2024-01-04 | End: 2024-01-04 | Stop reason: ALTCHOICE

## 2024-01-04 RX ADMIN — TROPICAMIDE 1 DROP: 10 SOLUTION/ DROPS OPHTHALMIC at 08:50

## 2024-01-04 RX ADMIN — CYCLOPENTOLATE HYDROCHLORIDE 1 DROP: 20 SOLUTION/ DROPS OPHTHALMIC at 08:50

## 2024-01-04 RX ADMIN — ONDANSETRON 4 MG: 2 INJECTION INTRAMUSCULAR; INTRAVENOUS at 10:29

## 2024-01-04 RX ADMIN — MIDAZOLAM 1 MG: 1 INJECTION INTRAMUSCULAR; INTRAVENOUS at 10:01

## 2024-01-04 RX ADMIN — PROPOFOL 10 MG: 10 INJECTION, EMULSION INTRAVENOUS at 10:33

## 2024-01-04 RX ADMIN — CYCLOPENTOLATE HYDROCHLORIDE 1 DROP: 20 SOLUTION/ DROPS OPHTHALMIC at 08:55

## 2024-01-04 RX ADMIN — PROPOFOL 10 MG: 10 INJECTION, EMULSION INTRAVENOUS at 10:17

## 2024-01-04 RX ADMIN — TROPICAMIDE 1 DROP: 10 SOLUTION/ DROPS OPHTHALMIC at 08:55

## 2024-01-04 RX ADMIN — METOPROLOL SUCCINATE 25 MG: 25 TABLET, EXTENDED RELEASE ORAL at 09:34

## 2024-01-04 RX ADMIN — PROPOFOL 10 MG: 10 INJECTION, EMULSION INTRAVENOUS at 10:19

## 2024-01-04 RX ADMIN — PROPOFOL 10 MG: 10 INJECTION, EMULSION INTRAVENOUS at 10:39

## 2024-01-04 RX ADMIN — PHENYLEPHRINE HYDROCHLORIDE 1 DROP: 25 SOLUTION/ DROPS OPHTHALMIC at 08:55

## 2024-01-04 RX ADMIN — PHENYLEPHRINE HYDROCHLORIDE 1 DROP: 25 SOLUTION/ DROPS OPHTHALMIC at 08:50

## 2024-01-04 RX ADMIN — PHENYLEPHRINE HYDROCHLORIDE 1 DROP: 25 SOLUTION/ DROPS OPHTHALMIC at 09:00

## 2024-01-04 RX ADMIN — PROPOFOL 10 MG: 10 INJECTION, EMULSION INTRAVENOUS at 10:29

## 2024-01-04 RX ADMIN — TROPICAMIDE 1 DROP: 10 SOLUTION/ DROPS OPHTHALMIC at 09:00

## 2024-01-04 RX ADMIN — MIDAZOLAM 1 MG: 1 INJECTION INTRAMUSCULAR; INTRAVENOUS at 10:04

## 2024-01-04 RX ADMIN — SODIUM CHLORIDE, SODIUM LACTATE, POTASSIUM CHLORIDE, AND CALCIUM CHLORIDE: 600; 310; 30; 20 INJECTION, SOLUTION INTRAVENOUS at 09:53

## 2024-01-04 RX ADMIN — PROPOFOL 10 MG: 10 INJECTION, EMULSION INTRAVENOUS at 10:47

## 2024-01-04 RX ADMIN — CYCLOPENTOLATE HYDROCHLORIDE 1 DROP: 20 SOLUTION/ DROPS OPHTHALMIC at 09:00

## 2024-01-04 ASSESSMENT — PAIN - FUNCTIONAL ASSESSMENT: PAIN_FUNCTIONAL_ASSESSMENT: 0-10

## 2024-01-04 ASSESSMENT — PAIN SCALES - GENERAL
PAINLEVEL_OUTOF10: 0
PAINLEVEL_OUTOF10: 0

## 2024-01-04 NOTE — OP NOTE
FULL OPERATIVE NOTE         Date Time: 01/04/24 @ 9:56 AM     Patient Name: Nancy Omalley     Attending Physician: Florian Joyner MD     Date of Operation:     01/04/24     Providers Performing:     Surgeon: Florian Joyner MD     Assistant: N/A     Operative Procedure:     Procedure(s):  EYE VITRECTOMY 25ga LASER, GAS FLUID EXCHANGE POSSIBLE SCAR TISSUE REMOVAL     Preoperative Diagnosis:     Rhegmatogenous retinal detachment with single break(s), left eye       Postoperative Diagnosis:     Rhegmatogenous retinal detachment with single break(s), left eye       Indications:     Loss of vision           Operative Notes:       Informed consent was obtained prior the procedure. The procedure, risks, benefits, and alternatives were discussed with the patient. In the preoperative holding area the operative eye was marked and confirmed with the patient. The patient was then brought back to the operating room and a surgical safety timeout was performed. The patient was then placed under monitored anesthesia care.      The operative eye was then prepped and draped in the usual sterile fashion for ophthalmic surgery.     The microscope was brought into position and the lid speculum was placed. Topical betadine was instilled on the conjunctiva. The patient  received a Sub-Tenon's block consisting of .75% Marcaine and 2% xylocaine through an inferonasal cutdown incision.  Standard 25 gauge instrumentation was used to create sclerotomies 3.5 mm posterior to the limbus. The infusion cannula was verified prior to turning on the infusion.  Upon visualization with the light pipe, the retinal detachment extended from 11 to 2 with the macula attached. There was a small retinal tear at 12:30. There was a demarcation line present from 12 to 2 o'clock with atrophic-appearing retina, raising suspicion for acute-on-chronic retinal detachment.      A core vitrectomy was performed.  A posterior vitreous detachment was already

## 2024-01-04 NOTE — ANESTHESIA PRE PROCEDURE
Department of Anesthesiology  Preprocedure Note       Name:  Nancy Omalley   Age:  83 y.o.  :  1940                                          MRN:  [id number]         Date:  2024      Surgeon: Surgeon(s):  Florian Joyner MD    Procedure: Procedure(s):  EYE VITRECTOMY 25ga LASER, SF6 GAS FLUID EXCHANGE    Medications prior to admission:   Prior to Admission medications    Medication Sig Start Date End Date Taking? Authorizing Provider   Cyanocobalamin 1000 MCG/ML KIT Inject 1,000 mcg into the muscle every 30 days 23  Yes Shelby Greco MD   doxycycline hyclate (VIBRA-TABS) 100 MG tablet  23   Shelby Greco MD   levothyroxine (SYNTHROID) 137 MCG tablet  10/18/23   Shelby Greco MD   metoprolol succinate (TOPROL XL) 25 MG extended release tablet Take 1 tablet by mouth in the morning and at bedtime 23   Chester Sharpe MD   losartan-hydroCHLOROthiazide (HYZAAR) 50-12.5 MG per tablet Take 0.5 tablets by mouth daily 23   Chester Sharpe MD   nitroGLYCERIN (NITROSTAT) 0.4 MG SL tablet Place 1 tablet under the tongue every 5 minutes as needed for Chest pain 10/17/23   Chester Sharpe MD   guaiFENesin (MUCINEX PO) Take by mouth    Shelby Greco MD   Dextromethorphan-guaiFENesin (ROBITUSSIN DM PO) Take by mouth    Shelby Greco MD   pitavastatin (LIVALO) 4 MG TABS tablet Take 1 tablet by mouth daily  Patient not taking: Reported on 2024   Chester Sharpe MD   empagliflozin (JARDIANCE) 10 MG tablet Take 1 tablet by mouth daily 23   Chester Sharpe MD   sotalol (BETAPACE) 120 MG tablet Take 1 tablet by mouth 2 times daily 23   Chester Sharpe MD   warfarin (COUMADIN) 5 MG tablet 1/2-1 tab po daily or as directed. 10/25/22   Chester Sharpe MD   ondansetron (ZOFRAN-ODT) 4 MG disintegrating tablet Take 1 tablet by mouth 3 times daily as needed 6/14/22   Provider, MD Shelby

## 2024-01-04 NOTE — DISCHARGE INSTRUCTIONS
POST OPERATIVE INSTRUCTIONS    BRING THIS PAPER AND THE EYE DROPS TO EVERY EYE APPOINTMENT AFTER SURGERY, READ THESE INSTRUCTIONS AND ASK ANY QUESTIONS AT YOUR APPOINTMENT     YOU SHOULD HAVE A FOLLOW UP APPOINTMENT SCHEDULED WITH YOUR SURGEON OR HIS COLLEAGUE. IF YOU ARE UNSURE OF THE TIME OR LOCATION OF YOUR APPOINTMENT, PLEASE CALL THE OFFICE.     OFFICE LOCATIONS AND PHONE NUMBERS CAN BE FOUND ON THE LAST PAGE OF THIS DOCUMENT OR ONLINE ([email or web address])    DAY OF SURGERY/OVERNIGHT AFTER YOUR SURGERY:     Please take it easy--no heavy lifting, bending at the waist or straining.   Do NOT remove the eye patch - we will remove it at your appointment the day after surgery. Bloody discharge on the patch is expected.   You will not need to use eye drops tonight.   Take over-the-counter pain medications (such as Tylenol, Advil, Ibuprofen) for pain if you need.   Please do not shower or get your patch wet!    POSITIONING: Face down x 24 hours. Sleep on RIGHT side.     If positioning face-down: 45 minutes of every hour face down, followed by a 15-minute break (eat, stretch, go to the restroom, etc)    DO NOT SLEEP ON YOUR BACK WITH YOUR FACE UP    NO AIRPLANE TRAVEL FOR 30 DAYS AND UNTIL CLEARED BY SURGEON    If you are positioning face down, do NOT sit in the front seat of the car as you will be too close to the air bag with your face down.    STARTING THE DAY AFTER YOUR SURGERY, FOLLOWING THE FIRST POST-OP APPOINTMENT:     Wear a hard eye shield during naps and at bedtime for 1 week (without gauze).    During the day, you do not need to cover the eye. You may wear the eye shield, glasses, or sunglasses if you want.   No rubbing the eye - dab gently around the eye with a tissue. Do not put pressure on the eyeball.   Do not drive until approved by your doctor and only do so for short distances on well-known roads.   DO NOT lift greater than 10 pounds or do any strenuous work or exercise for at least two

## 2024-01-04 NOTE — ANESTHESIA POSTPROCEDURE EVALUATION
Department of Anesthesiology  Postprocedure Note    Patient: Nancy Omalley  MRN: [id number]  YOB: 1940  Date of evaluation: 1/4/2024    Procedure Summary       Date: 01/04/24 Room / Location: [facility] OP OR 08 / [facility]    Anesthesia Start: 0953 Anesthesia Stop: 1107    Procedure: EYE VITRECTOMY 25ga LASER, SF6 GAS FLUID EXCHANGE (Left: Eye) Diagnosis:       Left retinal detachment      (Left retinal detachment [H33.22])    Surgeons: Florian Joyner MD Responsible Provider: Abe Villagomez MD    Anesthesia Type: TIVA ASA Status: 3            Anesthesia Type: TIVA    Kim Phase I: Kim Score: 8    Kim Phase II: Kim Score: 10    Anesthesia Post Evaluation    Patient location during evaluation: bedside  Patient participation: complete - patient participated  Level of consciousness: awake and alert  Airway patency: patent  Nausea & Vomiting: no vomiting  Cardiovascular status: hemodynamically stable  Respiratory status: acceptable  Hydration status: euvolemic  Pain management: adequate    No notable events documented.

## 2024-01-11 ENCOUNTER — ANTI-COAG VISIT (OUTPATIENT)
Age: 84
End: 2024-01-11
Payer: MEDICARE

## 2024-01-11 DIAGNOSIS — Z79.01 LONG TERM (CURRENT) USE OF ANTICOAGULANTS: Primary | ICD-10-CM

## 2024-01-11 DIAGNOSIS — I48.0 PAROXYSMAL ATRIAL FIBRILLATION (HCC): ICD-10-CM

## 2024-01-11 LAB
POC INR: 1.7
PROTHROMBIN TIME, POC: NORMAL

## 2024-01-11 PROCEDURE — 85610 PROTHROMBIN TIME: CPT | Performed by: INTERNAL MEDICINE

## 2024-01-11 NOTE — PROGRESS NOTES
Warfarin tablet strength and weekly dosing schedule confirmed today. One time dose of 5 mg tonight instead of 2.5 mg. Then continue current maintenance plan (see Anticoag Dosing Calendar). INR to be rechecked in two week(s).

## 2024-01-11 NOTE — PATIENT INSTRUCTIONS
Reminder: Please contact the Coumadin Clinic at [phone] when you have medication changes. Examples, new medications, antibiotics, discontinued medications, new supplements, missed doses of warfarin or if you took extra doses of warfarin.  This also includes OTC medications. Notifying us helps reduce the possibility of high and low INR's. In addition, if warfarin needs to be held for any procedures, please have surgeon or physician's office contact us before holding anticoagulant. Thanks, [facility] Cardiology Coumadin Clinic.

## 2024-01-25 ENCOUNTER — ANTI-COAG VISIT (OUTPATIENT)
Age: 84
End: 2024-01-25

## 2024-01-25 DIAGNOSIS — I48.0 PAROXYSMAL ATRIAL FIBRILLATION (HCC): ICD-10-CM

## 2024-01-25 DIAGNOSIS — Z79.01 LONG TERM (CURRENT) USE OF ANTICOAGULANTS: Primary | ICD-10-CM

## 2024-01-25 LAB
POC INR: 2.8
PROTHROMBIN TIME, POC: NORMAL

## 2024-01-25 NOTE — PATIENT INSTRUCTIONS
Reminder: Please contact the Coumadin Clinic at [phone] when you have medication changes. Examples, new medications, antibiotics, discontinued medications, new supplements, missed doses of warfarin or if you took extra doses of warfarin.  This also includes OTC medications. Notifying us helps reduce the possibility of high and low INR's. In addition, if warfarin needs to be held for any procedures, please have surgeon or physician's office contact us before holding anticoagulant. Thanks, [facility] Cardiology Coumadin Clinic.

## 2024-02-07 ENCOUNTER — ANTI-COAG VISIT (OUTPATIENT)
Age: 84
End: 2024-02-07

## 2024-02-07 DIAGNOSIS — I48.0 PAROXYSMAL ATRIAL FIBRILLATION (HCC): ICD-10-CM

## 2024-02-07 DIAGNOSIS — Z79.01 LONG TERM (CURRENT) USE OF ANTICOAGULANTS: Primary | ICD-10-CM

## 2024-02-07 LAB
POC INR: 2.1
PROTHROMBIN TIME, POC: NORMAL

## 2024-02-07 NOTE — PATIENT INSTRUCTIONS
Reminder: Please contact the Coumadin Clinic at [phone] when you have medication changes. Examples, new medications, antibiotics, discontinued medications, new supplements, missed doses of warfarin or if you took extra doses of warfarin.  This also includes OTC medications. Notifying us helps reduce the possibility of high and low INR's. In addition, if warfarin needs to be held for any procedures, please have surgeon or physician's office contact us before holding anticoagulant. Thanks, [facility] Cardiology Coumadin Clinic.

## 2024-03-04 ENCOUNTER — ANTI-COAG VISIT (OUTPATIENT)
Age: 84
End: 2024-03-04
Payer: MEDICARE

## 2024-03-04 DIAGNOSIS — Z79.01 LONG TERM (CURRENT) USE OF ANTICOAGULANTS: Primary | ICD-10-CM

## 2024-03-04 DIAGNOSIS — I48.0 PAROXYSMAL ATRIAL FIBRILLATION (HCC): ICD-10-CM

## 2024-03-04 LAB
POC INR: 3
PROTHROMBIN TIME, POC: NORMAL

## 2024-03-04 PROCEDURE — 93793 ANTICOAG MGMT PT WARFARIN: CPT | Performed by: INTERNAL MEDICINE

## 2024-03-04 PROCEDURE — 85610 PROTHROMBIN TIME: CPT | Performed by: INTERNAL MEDICINE

## 2024-03-04 RX ORDER — MONTELUKAST SODIUM 4 MG/1
1-2 TABLET, CHEWABLE ORAL DAILY
COMMUNITY
Start: 2024-02-01 | End: 2025-01-26

## 2024-03-04 NOTE — PATIENT INSTRUCTIONS
Reminder: Please contact the Coumadin Clinic at [phone] when you have medication changes. Examples, new medications, antibiotics, discontinued medications, new supplements, missed doses of warfarin or if you took extra doses of warfarin.  This also includes OTC medications. Notifying us helps reduce the possibility of high and low INR's. In addition, if warfarin needs to be held for any procedures, please have surgeon or physician's office contact us before holding anticoagulant. Thanks, [facility] Cardiology Coumadin Clinic.

## 2024-03-04 NOTE — PROGRESS NOTES
Continue current maintenance plan (see Anticoag Dosing Calendar). INR to be rechecked in two week(s) per patient's request as she has been on Colestid, wants to make sure her INR stays in range.. Warfarin tablet strength and weekly dosing schedule confirmed today.

## 2024-03-16 NOTE — PROGRESS NOTES
[facility] CARDIOLOGY  [address] [place] [address], [address]  [place] [address]  PHONE: [phone]        NAME:  Nancy Omalley  : 1940  MRN: [id number]     PCP:  Heron Liang MD      SUBJECTIVE:   Nancy Omalley is a 84 y.o. female seen for a follow up visit regarding the following:     Chief Complaint   Patient presents with    Atrial Fibrillation    Follow-up    Hypertension       HPI:    Doing well since last office visit without interval angina, syncope, CHF, or palpitations.  Burst of palpitations previously noted multiple office visits have now resolved after pacemaker setting changes and she is doing great, ECG today as noted below.    Nuclear stress test 10/9/2023:    Stress Test: A pharmacological stress test was performed using lexiscan.    ECG: Resting ECG demonstrates ventricular pacing.    Stress Function: Left ventricular function post-stress is normal. Post-stress ejection fraction is 62%.    Perfusion Comments: LV perfusion is normal.    Stress Combined Conclusion: The study is negative for myocardial ischemia. Findings suggest a low risk of cardiac events.    Holter monitor 2023:  Sinus rhythm with no atrial fibrillation or atrial flutter  PACs and PVCs with short burst of atrial tachycardia  Longer runs of either atrial tach or SVT, stay well-hydrated, continue meds and keep routine follow-up to discuss further options    Echocardiogram 2023:  Left Ventricle Mildly reduced left ventricular systolic function with a visually estimated EF of 45 - 50%. Left ventricle size is normal. Moderately increased wall thickness. Mild global hypokinesis present. Abnormal diastolic function.   Left Atrium Left atrium size is normal.   Right Ventricle Right ventricle size is normal. Lead present in the right ventricle. Normal systolic function.   Right Atrium Lead present in the right atrium. Right atrium size is normal.   Aortic Valve Trileaflet valve. Mild sclerosis of

## 2024-03-18 ENCOUNTER — ANTI-COAG VISIT (OUTPATIENT)
Age: 84
End: 2024-03-18
Payer: MEDICARE

## 2024-03-18 DIAGNOSIS — Z79.01 LONG TERM (CURRENT) USE OF ANTICOAGULANTS: Primary | ICD-10-CM

## 2024-03-18 DIAGNOSIS — I48.0 PAROXYSMAL ATRIAL FIBRILLATION (HCC): ICD-10-CM

## 2024-03-18 LAB
POC INR: 2.2
PROTHROMBIN TIME, POC: NORMAL

## 2024-03-18 PROCEDURE — 93793 ANTICOAG MGMT PT WARFARIN: CPT | Performed by: INTERNAL MEDICINE

## 2024-03-18 PROCEDURE — 85610 PROTHROMBIN TIME: CPT | Performed by: INTERNAL MEDICINE

## 2024-03-18 NOTE — PATIENT INSTRUCTIONS
Reminder: Please contact the [facility]madin Clinic at [phone] when you have medication changes. Examples, new medications, antibiotics, discontinued medications, new supplements, missed doses of warfarin or if you took extra doses of warfarin.  This also includes OTC medications. Notifying us helps reduce the possibility of high and low INR's. In addition, if warfarin needs to be held for any procedures, please have surgeon or physician's office contact us before holding anticoagulant. Thanks, [facility] Cardiology Coumadin Clinic.

## 2024-03-20 ENCOUNTER — OFFICE VISIT (OUTPATIENT)
Age: 84
End: 2024-03-20
Payer: MEDICARE

## 2024-03-20 VITALS
SYSTOLIC BLOOD PRESSURE: 120 MMHG | DIASTOLIC BLOOD PRESSURE: 70 MMHG | WEIGHT: 149 LBS | HEART RATE: 76 BPM | BODY MASS INDEX: 24.79 KG/M2

## 2024-03-20 DIAGNOSIS — Z95.0 PACEMAKER: ICD-10-CM

## 2024-03-20 DIAGNOSIS — I48.0 PAROXYSMAL ATRIAL FIBRILLATION (HCC): Primary | ICD-10-CM

## 2024-03-20 DIAGNOSIS — I10 PRIMARY HYPERTENSION: ICD-10-CM

## 2024-03-20 DIAGNOSIS — I47.19 ATRIAL TACHYCARDIA: ICD-10-CM

## 2024-03-20 DIAGNOSIS — Z79.01 LONG TERM (CURRENT) USE OF ANTICOAGULANTS: ICD-10-CM

## 2024-03-20 DIAGNOSIS — E78.5 DYSLIPIDEMIA: ICD-10-CM

## 2024-03-20 PROCEDURE — 1123F ACP DISCUSS/DSCN MKR DOCD: CPT | Performed by: INTERNAL MEDICINE

## 2024-03-20 PROCEDURE — 1036F TOBACCO NON-USER: CPT | Performed by: INTERNAL MEDICINE

## 2024-03-20 PROCEDURE — 1090F PRES/ABSN URINE INCON ASSESS: CPT | Performed by: INTERNAL MEDICINE

## 2024-03-20 PROCEDURE — G8420 CALC BMI NORM PARAMETERS: HCPCS | Performed by: INTERNAL MEDICINE

## 2024-03-20 PROCEDURE — G8427 DOCREV CUR MEDS BY ELIG CLIN: HCPCS | Performed by: INTERNAL MEDICINE

## 2024-03-20 PROCEDURE — G8484 FLU IMMUNIZE NO ADMIN: HCPCS | Performed by: INTERNAL MEDICINE

## 2024-03-20 PROCEDURE — 99214 OFFICE O/P EST MOD 30 MIN: CPT | Performed by: INTERNAL MEDICINE

## 2024-03-20 PROCEDURE — 93000 ELECTROCARDIOGRAM COMPLETE: CPT | Performed by: INTERNAL MEDICINE

## 2024-04-15 ENCOUNTER — ANTI-COAG VISIT (OUTPATIENT)
Age: 84
End: 2024-04-15
Payer: MEDICARE

## 2024-04-15 DIAGNOSIS — I48.0 PAROXYSMAL ATRIAL FIBRILLATION (HCC): ICD-10-CM

## 2024-04-15 DIAGNOSIS — Z79.01 LONG TERM (CURRENT) USE OF ANTICOAGULANTS: Primary | ICD-10-CM

## 2024-04-15 LAB
POC INR: 2.8
PROTHROMBIN TIME, POC: NORMAL

## 2024-04-15 PROCEDURE — 85610 PROTHROMBIN TIME: CPT | Performed by: INTERNAL MEDICINE

## 2024-05-10 NOTE — TELEPHONE ENCOUNTER
Requested Prescriptions     Pending Prescriptions Disp Refills    sotalol (BETAPACE) 120 MG tablet 180 tablet 3     Sig: Take 1 tablet by mouth 2 times daily     Verified rx. Last OV 03/20/24. Erx to pharm on file.

## 2024-05-10 NOTE — TELEPHONE ENCOUNTER
MEDICATION REFILL REQUEST      Name of Medication:  Sotalol  Dose:  120 mg  Frequency:  QD  Quantity:  180  Days' supply:  90 with 3 refills      Pharmacy Name/Location:  [phone]

## 2024-05-12 RX ORDER — SOTALOL HYDROCHLORIDE 120 MG/1
120 TABLET ORAL 2 TIMES DAILY
Qty: 180 TABLET | Refills: 3 | Status: SHIPPED | OUTPATIENT
Start: 2024-05-12

## 2024-05-13 ENCOUNTER — ANTI-COAG VISIT (OUTPATIENT)
Age: 84
End: 2024-05-13
Payer: MEDICARE

## 2024-05-13 DIAGNOSIS — Z79.01 LONG TERM (CURRENT) USE OF ANTICOAGULANTS: Primary | ICD-10-CM

## 2024-05-13 DIAGNOSIS — I48.0 PAROXYSMAL ATRIAL FIBRILLATION (HCC): ICD-10-CM

## 2024-05-13 LAB
POC INR: 3.5
PROTHROMBIN TIME, POC: NORMAL

## 2024-05-13 PROCEDURE — 93793 ANTICOAG MGMT PT WARFARIN: CPT | Performed by: INTERNAL MEDICINE

## 2024-05-13 PROCEDURE — 85610 PROTHROMBIN TIME: CPT | Performed by: INTERNAL MEDICINE

## 2024-05-21 ENCOUNTER — ANTI-COAG VISIT (OUTPATIENT)
Age: 84
End: 2024-05-21
Payer: MEDICARE

## 2024-05-21 DIAGNOSIS — Z79.01 LONG TERM (CURRENT) USE OF ANTICOAGULANTS: Primary | ICD-10-CM

## 2024-05-21 DIAGNOSIS — I48.0 PAROXYSMAL ATRIAL FIBRILLATION (HCC): ICD-10-CM

## 2024-05-21 LAB
POC INR: 2.7
PROTHROMBIN TIME, POC: NORMAL

## 2024-05-21 PROCEDURE — 93793 ANTICOAG MGMT PT WARFARIN: CPT | Performed by: INTERNAL MEDICINE

## 2024-05-21 PROCEDURE — 85610 PROTHROMBIN TIME: CPT | Performed by: INTERNAL MEDICINE

## 2024-06-10 ENCOUNTER — ANTI-COAG VISIT (OUTPATIENT)
Age: 84
End: 2024-06-10
Payer: MEDICARE

## 2024-06-10 DIAGNOSIS — Z79.01 LONG TERM (CURRENT) USE OF ANTICOAGULANTS: Primary | ICD-10-CM

## 2024-06-10 DIAGNOSIS — I48.0 PAROXYSMAL ATRIAL FIBRILLATION (HCC): ICD-10-CM

## 2024-06-10 LAB
POC INR: 3.4
PROTHROMBIN TIME, POC: ABNORMAL

## 2024-06-10 PROCEDURE — 93793 ANTICOAG MGMT PT WARFARIN: CPT | Performed by: INTERNAL MEDICINE

## 2024-06-10 PROCEDURE — 85610 PROTHROMBIN TIME: CPT | Performed by: INTERNAL MEDICINE

## 2024-06-10 NOTE — PROGRESS NOTES
Warfarin tablet strength and weekly dosing schedule confirmed today. Patient knows of no reason for subtherapeutic INR result. One time dose of \"0\" mg tonight instead of 2.5 mg. Then continue current maintenance plan (see Anticoag Dosing Calendar). INR to be rechecked in two week(s).

## 2024-06-10 NOTE — PATIENT INSTRUCTIONS
Reminder: Please contact the Coumadin Clinic at [phone] when you have medication changes. Examples, new medications, antibiotics, discontinued medications, new supplements, missed doses of warfarin or if you took extra doses of warfarin.  This also includes OTC medications. Notifying us helps reduce the possibility of high and low INR's. In addition, if warfarin needs to be held for any procedures, please have surgeon or physician's office contact us before holding anticoagulant. Thanks, [facility] Cardiology Coumadin Clinic.       Please go to the Emergency Department if you experience:  - Extreme shortness of breath or chest pain  - Red, warm, painful, swollen limb  - Numbness or tingling on one side of the body  - Slurred speech or major vision change  - Extreme headache

## 2024-06-24 ENCOUNTER — ANTI-COAG VISIT (OUTPATIENT)
Age: 84
End: 2024-06-24
Payer: MEDICARE

## 2024-06-24 DIAGNOSIS — I48.0 PAROXYSMAL ATRIAL FIBRILLATION (HCC): ICD-10-CM

## 2024-06-24 DIAGNOSIS — Z79.01 LONG TERM (CURRENT) USE OF ANTICOAGULANTS: Primary | ICD-10-CM

## 2024-06-24 LAB
POC INR: 2.4
PROTHROMBIN TIME, POC: NORMAL

## 2024-06-24 PROCEDURE — 85610 PROTHROMBIN TIME: CPT | Performed by: INTERNAL MEDICINE

## 2024-06-24 PROCEDURE — 93793 ANTICOAG MGMT PT WARFARIN: CPT | Performed by: INTERNAL MEDICINE

## 2024-06-24 NOTE — PATIENT INSTRUCTIONS
Reminder: Please contact the Coumadin Clinic at [phone] when you have medication changes. Examples, new medications, antibiotics, discontinued medications, new supplements, missed doses of warfarin or if you took extra doses of warfarin.  This also includes OTC medications. Notifying us helps reduce the possibility of high and low INR's. In addition, if warfarin needs to be held for any procedures, please have surgeon or physician's office contact us before holding anticoagulant. Thanks, [facility] Cardiology Coumadin Clinic.       Please go to the Emergency Department if you experience:  - Extreme shortness of breath or chest pain  - Red, warm, painful, swollen limb  - Numbness or tingling on one side of the body  - Slurred speech or major vision change  - Extreme headache

## 2024-07-22 ENCOUNTER — ANTI-COAG VISIT (OUTPATIENT)
Age: 84
End: 2024-07-22

## 2024-07-22 DIAGNOSIS — Z79.01 LONG TERM (CURRENT) USE OF ANTICOAGULANTS: Primary | ICD-10-CM

## 2024-07-22 DIAGNOSIS — I48.0 PAROXYSMAL ATRIAL FIBRILLATION (HCC): ICD-10-CM

## 2024-07-22 LAB
POC INR: 2.1
PROTHROMBIN TIME, POC: NORMAL

## 2024-07-22 NOTE — PATIENT INSTRUCTIONS
Reminder: Please contact the Coumadin Clinic at [phone] when you have medication changes. Examples, new medications, antibiotics, discontinued medications, new supplements, missed doses of warfarin or if you took extra doses of warfarin.  This also includes OTC medications. Notifying us helps reduce the possibility of high and low INR's. In addition, if warfarin needs to be held for any procedures, please have surgeon or physician's office contact us before holding anticoagulant. Thanks, [facility] Cardiology Coumadin Clinic.       Please go to the Emergency Department if you experience:  - Extreme shortness of breath or chest pain  - Red, warm, painful, swollen limb  - Numbness or tingling on one side of the body  - Slurred speech or major vision change  - Extreme headache

## 2024-08-02 NOTE — TELEPHONE ENCOUNTER
MEDICATION REFILL REQUEST      Name of Medication:  Jardiance  Dose:  10 mg  Frequency:  QD  Quantity:  90  Days' supply:  90 with 3 refills      Pharmacy Name/Location:  [phone]

## 2024-08-05 NOTE — TELEPHONE ENCOUNTER
Requested Prescriptions     Pending Prescriptions Disp Refills    empagliflozin (JARDIANCE) 10 MG tablet 90 tablet 3     Sig: Take 1 tablet by mouth daily       Verified rx. Last OV 03/20/24. Erx to pharm on file.

## 2024-08-19 ENCOUNTER — ANTI-COAG VISIT (OUTPATIENT)
Age: 84
End: 2024-08-19

## 2024-08-19 DIAGNOSIS — Z79.01 LONG TERM (CURRENT) USE OF ANTICOAGULANTS: Primary | ICD-10-CM

## 2024-08-19 DIAGNOSIS — I48.0 PAROXYSMAL ATRIAL FIBRILLATION (HCC): ICD-10-CM

## 2024-08-19 LAB
POC INR: 2.7
PROTHROMBIN TIME, POC: NORMAL

## 2024-08-19 RX ORDER — WARFARIN SODIUM 5 MG/1
TABLET ORAL
Qty: 90 TABLET | Refills: 3 | Status: SHIPPED | OUTPATIENT
Start: 2024-08-19

## 2024-08-19 NOTE — TELEPHONE ENCOUNTER
Ayanna Alicia is a 52 y.o.  female patient, who presents for a 6 minute walk test ordered by MD Carlton.  The diagnosis is Interstitial Lung Disease; Connective Tissue Disease; Oxygen Titration.  The patient's BMI is 37.1 kg/m2.  Predicted distance (lower limit of normal) is 331.68 meters.      Test Results:    The test was not completed.  The patient stopped 1 time for a total of 248 seconds.  The total time walked was 112 seconds.  During walking, the patient reported:  Dyspnea, Other (Comment) (right side pain).  The patient used supplemental oxygen and a wheelchair for assistance during testing.     03/16/2023---------Distance: 60.96 meters (200 feet)     Time O2 Sat % Supplemental Oxygen Heart Rate Blood Pressure Jessica Scale Comment   Pre-exercise  (Resting) 0 98 % 6 L/M 107 bpm 124/67 mmHg 4    During Exercise 1 min 80 % 6 L/M 137 bpm      During Exercise 2 min 68% 6 L/M 137 bpm 168/81 mmHg 7-8 Stopped due to dyspnea   During Exercise 3 min         During Exercise 4 min         During Exercise 5 min         During Exercise 6 min         Post-exercise  (Recovery)  97 % 6 L/M  119 bpm 134/75 mmHg       Recovery Time: 337 seconds    Performing nurse/tech: [employer] RRT      PREVIOUS STUDY on 10/10/2022 at [facility]:  213 meters (698 feet).      CLINICAL INTERPRETATION:  Six minute walk distance is 60.96 meters (200 feet) with very heavy dyspnea.  During exercise, there was significant desaturation while breathing supplemental oxygen.  Both blood pressure and heart rate increased significantly with walking.  Tachycardia was present prior to exercise.  The patient reported non-pulmonary symptoms during exercise.  Severe exercise impairment is likely due to desaturation, cardiovascular causes, and subjective symptoms.  The patient did not complete the study, walking 112 seconds of the 360 second test.  Since the previous study in October 2022, exercise capacity is significantly  Requested Prescriptions     Pending Prescriptions Disp Refills    warfarin (COUMADIN) 5 MG tablet 90 tablet 3     Si/2-1 tab po daily or as directed.     Pt was seen today for INR check. Pt need rx refilled.    worse.  Based upon age and body mass index, exercise capacity is less than predicted.

## 2024-08-19 NOTE — PATIENT INSTRUCTIONS
Reminder: Please contact the Coumadin Clinic at [phone] when you have medication changes. Examples, new medications, antibiotics, discontinued medications, new supplements, missed doses of warfarin or if you took extra doses of warfarin.  This also includes OTC medications. Notifying us helps reduce the possibility of high and low INR's. In addition, if warfarin needs to be held for any procedures, please have surgeon or physician's office contact us before holding anticoagulant. Thanks, [facility] Cardiology Coumadin Clinic.       Please go to the Emergency Department if you experience:  - Extreme shortness of breath or chest pain  - Red, warm, painful, swollen limb  - Numbness or tingling on one side of the body  - Slurred speech or major vision change  - Extreme headache

## 2024-09-24 ENCOUNTER — TELEPHONE (OUTPATIENT)
Age: 84
End: 2024-09-24

## 2024-09-25 ENCOUNTER — TELEPHONE (OUTPATIENT)
Age: 84
End: 2024-09-25

## 2024-09-30 ENCOUNTER — TELEPHONE (OUTPATIENT)
Age: 84
End: 2024-09-30

## 2024-09-30 NOTE — TELEPHONE ENCOUNTER
MEDICATION REFILL REQUEST      Name of Medication:  Ezetimibe  Dose:  10 mg  Frequency:  daily   Quantity:    Days' supply:        [facility]

## 2024-10-01 NOTE — TELEPHONE ENCOUNTER
Spoke to pt and informed her  writes this Rx and to contact them for a refill. Pt verbalized understanding.

## 2024-10-09 ASSESSMENT — ENCOUNTER SYMPTOMS
PHOTOPHOBIA: 0
ABDOMINAL DISTENTION: 0
DIARRHEA: 0
SHORTNESS OF BREATH: 0
SORE THROAT: 0
CONSTIPATION: 0
ABDOMINAL PAIN: 0

## 2024-10-09 NOTE — PROGRESS NOTES
[facility] CARDIOLOGY  [address] [place] [address], [address]  [place] [address]  PHONE: [phone]        NAME:  Nancy Omalley  : 1940  MRN: [id number]     PCP:  Heron Liang MD      SUBJECTIVE:   Nancy Omalley is a 84 y.o. female seen for a follow up visit regarding the following:     Chief Complaint   Patient presents with    Paroxysmal atrial fibrillation    Aortic valve insufficiency, etiology of cardiac valve disea       HPI:    Doing well since last office visit without interval angina, syncope, CHF, or palpitations.  Burst of palpitations previously noted multiple office visits have now resolved after pacemaker setting changes last  and and she is doing great, interrogation looks good today as noted below.    Nuclear stress test 10/9/2023:    Stress Test: A pharmacological stress test was performed using lexiscan.    ECG: Resting ECG demonstrates ventricular pacing.    Stress Function: Left ventricular function post-stress is normal. Post-stress ejection fraction is 62%.    Perfusion Comments: LV perfusion is normal.    Stress Combined Conclusion: The study is negative for myocardial ischemia. Findings suggest a low risk of cardiac events.    Holter monitor 2023:  Sinus rhythm with no atrial fibrillation or atrial flutter  PACs and PVCs with short burst of atrial tachycardia  Longer runs of either atrial tach or SVT, stay well-hydrated, continue meds and keep routine follow-up to discuss further options    Echocardiogram 2023:  Left Ventricle Mildly reduced left ventricular systolic function with a visually estimated EF of 45 - 50%. Left ventricle size is normal. Moderately increased wall thickness. Mild global hypokinesis present. Abnormal diastolic function.   Left Atrium Left atrium size is normal.   Right Ventricle Right ventricle size is normal. Lead present in the right ventricle. Normal systolic function.   Right Atrium Lead present in the right atrium.

## 2024-10-11 ENCOUNTER — NURSE ONLY (OUTPATIENT)
Age: 84
End: 2024-10-11

## 2024-10-11 ENCOUNTER — ANTI-COAG VISIT (OUTPATIENT)
Age: 84
End: 2024-10-11

## 2024-10-11 ENCOUNTER — OFFICE VISIT (OUTPATIENT)
Age: 84
End: 2024-10-11
Payer: MEDICARE

## 2024-10-11 VITALS
HEIGHT: 65 IN | HEART RATE: 75 BPM | WEIGHT: 144.6 LBS | BODY MASS INDEX: 24.09 KG/M2 | DIASTOLIC BLOOD PRESSURE: 76 MMHG | SYSTOLIC BLOOD PRESSURE: 130 MMHG

## 2024-10-11 DIAGNOSIS — Z95.0 PACEMAKER: Primary | ICD-10-CM

## 2024-10-11 DIAGNOSIS — Z79.01 LONG TERM (CURRENT) USE OF ANTICOAGULANTS: ICD-10-CM

## 2024-10-11 DIAGNOSIS — I10 PRIMARY HYPERTENSION: ICD-10-CM

## 2024-10-11 DIAGNOSIS — I48.0 PAROXYSMAL ATRIAL FIBRILLATION (HCC): ICD-10-CM

## 2024-10-11 DIAGNOSIS — E78.5 DYSLIPIDEMIA: ICD-10-CM

## 2024-10-11 DIAGNOSIS — Z79.01 LONG TERM (CURRENT) USE OF ANTICOAGULANTS: Primary | ICD-10-CM

## 2024-10-11 DIAGNOSIS — I25.10 ATHEROSCLEROSIS OF NATIVE CORONARY ARTERY OF NATIVE HEART WITHOUT ANGINA PECTORIS: ICD-10-CM

## 2024-10-11 DIAGNOSIS — I44.2 COMPLETE HEART BLOCK (HCC): ICD-10-CM

## 2024-10-11 DIAGNOSIS — I48.0 PAROXYSMAL ATRIAL FIBRILLATION (HCC): Primary | ICD-10-CM

## 2024-10-11 DIAGNOSIS — Z95.0 PACEMAKER: ICD-10-CM

## 2024-10-11 LAB
POC INR: 1.3
PROTHROMBIN TIME, POC: ABNORMAL

## 2024-10-11 PROCEDURE — G8484 FLU IMMUNIZE NO ADMIN: HCPCS | Performed by: INTERNAL MEDICINE

## 2024-10-11 PROCEDURE — G8427 DOCREV CUR MEDS BY ELIG CLIN: HCPCS | Performed by: INTERNAL MEDICINE

## 2024-10-11 PROCEDURE — 1036F TOBACCO NON-USER: CPT | Performed by: INTERNAL MEDICINE

## 2024-10-11 PROCEDURE — 99214 OFFICE O/P EST MOD 30 MIN: CPT | Performed by: INTERNAL MEDICINE

## 2024-10-11 PROCEDURE — 1123F ACP DISCUSS/DSCN MKR DOCD: CPT | Performed by: INTERNAL MEDICINE

## 2024-10-11 PROCEDURE — 1090F PRES/ABSN URINE INCON ASSESS: CPT | Performed by: INTERNAL MEDICINE

## 2024-10-11 PROCEDURE — G8420 CALC BMI NORM PARAMETERS: HCPCS | Performed by: INTERNAL MEDICINE

## 2024-10-11 ASSESSMENT — ENCOUNTER SYMPTOMS: COUGH: 0

## 2024-10-11 NOTE — PATIENT INSTRUCTIONS
Reminder: Please contact the Coumadin Clinic at [phone] when you have medication changes. Examples, new medications, antibiotics, discontinued medications, new supplements, missed doses of warfarin or if you took extra doses of warfarin.  This also includes OTC medications. Notifying us helps reduce the possibility of high and low INR's. In addition, if warfarin needs to be held for any procedures, please have surgeon or physician's office contact us before holding anticoagulant. Thanks, [facility] Cardiology Coumadin Clinic.       Please go to the Emergency Department if you experience:  - Extreme shortness of breath or chest pain  - Red, warm, painful, swollen limb  - Numbness or tingling on one side of the body  - Slurred speech or major vision change  - Extreme headache

## 2024-10-11 NOTE — PROGRESS NOTES
Warfarin tablet strength and weekly dosing schedule confirmed today.  Patient to continue current maintenance plan (see Anticoag Dosing Calendar). INR to be rechecked in one week(s).   
no

## 2024-10-18 ENCOUNTER — ANTI-COAG VISIT (OUTPATIENT)
Age: 84
End: 2024-10-18

## 2024-10-18 DIAGNOSIS — I48.0 PAROXYSMAL ATRIAL FIBRILLATION (HCC): ICD-10-CM

## 2024-10-18 DIAGNOSIS — Z79.01 LONG TERM (CURRENT) USE OF ANTICOAGULANTS: Primary | ICD-10-CM

## 2024-10-18 LAB
POC INR: 1.8
PROTHROMBIN TIME, POC: ABNORMAL

## 2024-10-18 NOTE — PATIENT INSTRUCTIONS
Reminder: Please contact the Coumadin Clinic at [phone] when you have medication changes. Examples, new medications, antibiotics, discontinued medications, new supplements, missed doses of warfarin or if you took extra doses of warfarin.  This also includes OTC medications. Notifying us helps reduce the possibility of high and low INR's. In addition, if warfarin needs to be held for any procedures, please have surgeon or physician's office contact us before holding anticoagulant. Thanks, [facility] Cardiology Coumadin Clinic.       Please go to the Emergency Department if you experience:  - Extreme shortness of breath or chest pain  - Red, warm, painful, swollen limb  - Numbness or tingling on one side of the body  - Slurred speech or major vision change  - Extreme headache

## 2024-10-18 NOTE — PROGRESS NOTES
Warfarin tablet strength and weekly dosing schedule confirmed today. Patient to continue current maintenance plan (see Anticoag Dosing Calendar). INR to be rechecked in two week(s).

## 2024-11-01 ENCOUNTER — ANTI-COAG VISIT (OUTPATIENT)
Age: 84
End: 2024-11-01

## 2024-11-01 DIAGNOSIS — I48.0 PAROXYSMAL ATRIAL FIBRILLATION (HCC): ICD-10-CM

## 2024-11-01 DIAGNOSIS — Z79.01 LONG TERM (CURRENT) USE OF ANTICOAGULANTS: Primary | ICD-10-CM

## 2024-11-01 LAB
POC INR: 1.7
PROTHROMBIN TIME, POC: ABNORMAL

## 2024-11-01 NOTE — PROGRESS NOTES
Warfarin tablet strength and weekly dosing schedule confirmed today. Patient knows of no reason for subtherapeutic INR result.  Friday's increased to 5 mg, recheck INR in two weeks.

## 2024-11-01 NOTE — PATIENT INSTRUCTIONS
Reminder: Please contact the Coumadin Clinic at [phone] when you have medication changes. Examples, new medications, antibiotics, discontinued medications, new supplements, missed doses of warfarin or if you took extra doses of warfarin.  This also includes OTC medications. Notifying us helps reduce the possibility of high and low INR's. In addition, if warfarin needs to be held for any procedures, please have surgeon or physician's office contact us before holding anticoagulant. Thanks, [facility] Cardiology Coumadin Clinic.       Please go to the Emergency Department if you experience:  - Extreme shortness of breath or chest pain  - Red, warm, painful, swollen limb  - Numbness or tingling on one side of the body  - Slurred speech or major vision change  - Extreme headache

## 2024-11-18 ENCOUNTER — ANTI-COAG VISIT (OUTPATIENT)
Age: 84
End: 2024-11-18
Payer: MEDICARE

## 2024-11-18 DIAGNOSIS — I48.0 PAROXYSMAL ATRIAL FIBRILLATION (HCC): ICD-10-CM

## 2024-11-18 DIAGNOSIS — Z79.01 LONG TERM (CURRENT) USE OF ANTICOAGULANTS: Primary | ICD-10-CM

## 2024-11-18 LAB
POC INR: 3.6
PROTHROMBIN TIME, POC: NORMAL

## 2024-11-18 PROCEDURE — 85610 PROTHROMBIN TIME: CPT | Performed by: INTERNAL MEDICINE

## 2024-11-22 RX ORDER — METOPROLOL SUCCINATE 25 MG/1
25 TABLET, EXTENDED RELEASE ORAL 2 TIMES DAILY
Qty: 180 TABLET | Refills: 3 | Status: SHIPPED | OUTPATIENT
Start: 2024-11-22

## 2024-11-22 NOTE — TELEPHONE ENCOUNTER
MEDICATION REFILL REQUEST      Name of Medication:  Metoprolol   Dose:  25 mg  Frequency:  daily   Quantity:    Days' supply:        Pharmacy Name/Location:  [facility]

## 2024-12-02 ENCOUNTER — ANTI-COAG VISIT (OUTPATIENT)
Age: 84
End: 2024-12-02
Payer: MEDICARE

## 2024-12-02 DIAGNOSIS — Z79.01 LONG TERM (CURRENT) USE OF ANTICOAGULANTS: Primary | ICD-10-CM

## 2024-12-02 DIAGNOSIS — I48.0 PAROXYSMAL ATRIAL FIBRILLATION (HCC): ICD-10-CM

## 2024-12-02 LAB
POC INR: 3.4
PROTHROMBIN TIME, POC: ABNORMAL

## 2024-12-02 PROCEDURE — 93793 ANTICOAG MGMT PT WARFARIN: CPT | Performed by: INTERNAL MEDICINE

## 2024-12-02 PROCEDURE — 85610 PROTHROMBIN TIME: CPT | Performed by: INTERNAL MEDICINE

## 2024-12-02 NOTE — PROGRESS NOTES
Warfarin tablet strength and weekly dosing schedule confirmed today. Weekly dose was decreased at last INR check, patient continued to take 5 mg on Fridays' instead of 2.5 mg. Patient to start correct maintenance plan, 2.5 mg everyday (see Anticoag Dosing Calendar). INR to be rechecked in two week(s).

## 2024-12-02 NOTE — PATIENT INSTRUCTIONS
Reminder: Please contact the Coumadin Clinic at [phone] when you have medication changes. Examples, new medications, antibiotics, discontinued medications, new supplements, missed doses of warfarin or if you took extra doses of warfarin.  This also includes OTC medications. Notifying us helps reduce the possibility of high and low INR's. In addition, if warfarin needs to be held for any procedures, please have surgeon or physician's office contact us before holding anticoagulant. Thanks, [facility] Cardiology Coumadin Clinic.       Please go to the Emergency Department if you experience:  - Extreme shortness of breath or chest pain  - Red, warm, painful, swollen limb  - Numbness or tingling on one side of the body  - Slurred speech or major vision change  - Extreme headache

## 2024-12-16 ENCOUNTER — ANTI-COAG VISIT (OUTPATIENT)
Age: 84
End: 2024-12-16
Payer: MEDICARE

## 2024-12-16 DIAGNOSIS — I48.0 PAROXYSMAL ATRIAL FIBRILLATION (HCC): ICD-10-CM

## 2024-12-16 DIAGNOSIS — Z79.01 LONG TERM (CURRENT) USE OF ANTICOAGULANTS: Primary | ICD-10-CM

## 2024-12-16 LAB
POC INR: 4.1
PROTHROMBIN TIME, POC: NORMAL

## 2024-12-16 PROCEDURE — 85610 PROTHROMBIN TIME: CPT | Performed by: INTERNAL MEDICINE

## 2024-12-16 PROCEDURE — 93793 ANTICOAG MGMT PT WARFARIN: CPT | Performed by: INTERNAL MEDICINE

## 2024-12-16 NOTE — PATIENT INSTRUCTIONS
Reminder: Please contact the Coumadin Clinic at [phone] when you have medication changes. Examples, new medications, antibiotics, discontinued medications, new supplements, missed doses of warfarin or if you took extra doses of warfarin.  This also includes OTC medications. Notifying us helps reduce the possibility of high and low INR's. In addition, if warfarin needs to be held for any procedures, please have surgeon or physician's office contact us before holding anticoagulant. Thanks, [facility] Cardiology Coumadin Clinic.       Please go to the Emergency Department if you experience:  - Extreme shortness of breath or chest pain  - Red, warm, painful, swollen limb  - Numbness or tingling on one side of the body  - Slurred speech or major vision change  - Extreme headache

## 2024-12-16 NOTE — PROGRESS NOTES
One time dose of 0 mg tonight instead of 2.5 mg. Then continue current maintenance plan (see Anticoag Dosing Calendar). INR to be rechecked on 12/20/24.

## 2024-12-20 ENCOUNTER — ANTI-COAG VISIT (OUTPATIENT)
Age: 84
End: 2024-12-20

## 2024-12-20 DIAGNOSIS — Z79.01 LONG TERM (CURRENT) USE OF ANTICOAGULANTS: Primary | ICD-10-CM

## 2024-12-20 DIAGNOSIS — I48.0 PAROXYSMAL ATRIAL FIBRILLATION (HCC): ICD-10-CM

## 2024-12-20 LAB
POC INR: 3.3
PROTHROMBIN TIME, POC: NORMAL

## 2024-12-20 RX ORDER — WARFARIN SODIUM 2.5 MG/1
2.5 TABLET ORAL DAILY
Qty: 90 TABLET | Refills: 3 | Status: SHIPPED | OUTPATIENT
Start: 2024-12-20

## 2025-01-03 ENCOUNTER — ANTI-COAG VISIT (OUTPATIENT)
Age: 85
End: 2025-01-03

## 2025-01-03 DIAGNOSIS — I48.0 PAROXYSMAL ATRIAL FIBRILLATION (HCC): ICD-10-CM

## 2025-01-03 DIAGNOSIS — Z79.01 LONG TERM (CURRENT) USE OF ANTICOAGULANTS: Primary | ICD-10-CM

## 2025-01-03 LAB
POC INR: 2
PROTHROMBIN TIME, POC: NORMAL

## 2025-01-06 RX ORDER — PITAVASTATIN CALCIUM 4.18 MG/1
4 TABLET, FILM COATED ORAL DAILY
Qty: 90 TABLET | Refills: 3 | Status: SHIPPED | OUTPATIENT
Start: 2025-01-06

## 2025-01-06 NOTE — TELEPHONE ENCOUNTER
Patient called stating she has the following request :      Cholesterol medication  Would for Dr Sharpe to start writing pitavastatin (Livalo) going forward  [facility]  /  [phone]        Please call and advise if there are any questions.

## 2025-01-06 NOTE — TELEPHONE ENCOUNTER
Requested Prescriptions     Pending Prescriptions Disp Refills    pitavastatin (LIVALO) 4 MG TABS tablet 90 tablet 3     Sig: Take 1 tablet by mouth daily     Verified rx. Last OV 10/11/24. Erx to pharm on file.    Last Lipid Panel 09/23/24

## 2025-01-17 ENCOUNTER — ANTI-COAG VISIT (OUTPATIENT)
Age: 85
End: 2025-01-17

## 2025-01-17 DIAGNOSIS — Z79.01 LONG TERM (CURRENT) USE OF ANTICOAGULANTS: Primary | ICD-10-CM

## 2025-01-17 DIAGNOSIS — I48.0 PAROXYSMAL ATRIAL FIBRILLATION (HCC): ICD-10-CM

## 2025-01-17 LAB
POC INR: 1.8
PROTHROMBIN TIME, POC: NORMAL

## 2025-01-31 ENCOUNTER — ANTI-COAG VISIT (OUTPATIENT)
Age: 85
End: 2025-01-31
Payer: MEDICARE

## 2025-01-31 DIAGNOSIS — I48.0 PAROXYSMAL ATRIAL FIBRILLATION (HCC): ICD-10-CM

## 2025-01-31 DIAGNOSIS — Z79.01 LONG TERM (CURRENT) USE OF ANTICOAGULANTS: Primary | ICD-10-CM

## 2025-01-31 LAB
POC INR: 1.7
PROTHROMBIN TIME, POC: ABNORMAL

## 2025-01-31 PROCEDURE — 85610 PROTHROMBIN TIME: CPT | Performed by: INTERNAL MEDICINE

## 2025-01-31 PROCEDURE — 93793 ANTICOAG MGMT PT WARFARIN: CPT | Performed by: INTERNAL MEDICINE

## 2025-01-31 NOTE — PATIENT INSTRUCTIONS
Reminder: Please contact the Coumadin Clinic at [phone] when you have medication changes. Examples, new medications, antibiotics, discontinued medications, new supplements, missed doses of warfarin or if you took extra doses of warfarin.  This also includes OTC medications. Notifying us helps reduce the possibility of high and low INR's. In addition, if warfarin needs to be held for any procedures, please have surgeon or physician's office contact us before holding anticoagulant. Thanks, [facility] Cardiology Coumadin Clinic.       Please go to the Emergency Department if you experience:  - Extreme shortness of breath or chest pain  - Red, warm, painful, swollen limb  - Numbness or tingling on one side of the body  - Slurred speech or major vision change  - Extreme headache     
2 weeks for all PT goals below

## 2025-02-07 ENCOUNTER — APPOINTMENT (OUTPATIENT)
Dept: CT IMAGING | Age: 85
DRG: 481 | End: 2025-02-07
Payer: MEDICARE

## 2025-02-07 ENCOUNTER — APPOINTMENT (OUTPATIENT)
Dept: GENERAL RADIOLOGY | Age: 85
DRG: 481 | End: 2025-02-07
Payer: MEDICARE

## 2025-02-07 ENCOUNTER — HOSPITAL ENCOUNTER (INPATIENT)
Age: 85
LOS: 2 days | Discharge: NURSING, REHAB OR LONG-TERM CARE | DRG: 481 | End: 2025-02-10
Attending: EMERGENCY MEDICINE | Admitting: HOSPITALIST
Payer: MEDICARE

## 2025-02-07 DIAGNOSIS — S09.90XA CLOSED HEAD INJURY, INITIAL ENCOUNTER: ICD-10-CM

## 2025-02-07 DIAGNOSIS — S00.03XA HEMATOMA OF SCALP, INITIAL ENCOUNTER: ICD-10-CM

## 2025-02-07 DIAGNOSIS — S72.001A CLOSED FRACTURE OF RIGHT HIP, INITIAL ENCOUNTER (HCC): Primary | ICD-10-CM

## 2025-02-07 LAB
ALBUMIN SERPL-MCNC: 3.5 G/DL (ref 3.2–4.6)
ALBUMIN/GLOB SERPL: 1.1 (ref 1–1.9)
ALP SERPL-CCNC: 65 U/L (ref 35–104)
ALT SERPL-CCNC: 27 U/L (ref 8–45)
ANION GAP SERPL CALC-SCNC: 12 MMOL/L (ref 7–16)
AST SERPL-CCNC: 34 U/L (ref 15–37)
BASOPHILS # BLD: 0.06 K/UL (ref 0–0.2)
BASOPHILS NFR BLD: 0.7 % (ref 0–2)
BILIRUB SERPL-MCNC: 0.6 MG/DL (ref 0–1.2)
BUN SERPL-MCNC: 23 MG/DL (ref 8–23)
CALCIUM SERPL-MCNC: 10 MG/DL (ref 8.8–10.2)
CHLORIDE SERPL-SCNC: 104 MMOL/L (ref 98–107)
CO2 SERPL-SCNC: 24 MMOL/L (ref 20–29)
CREAT SERPL-MCNC: 1 MG/DL (ref 0.6–1.1)
DIFFERENTIAL METHOD BLD: ABNORMAL
EOSINOPHIL # BLD: 0.09 K/UL (ref 0–0.8)
EOSINOPHIL NFR BLD: 1.1 % (ref 0.5–7.8)
ERYTHROCYTE [DISTWIDTH] IN BLOOD BY AUTOMATED COUNT: 16 % (ref 11.9–14.6)
GLOBULIN SER CALC-MCNC: 3.1 G/DL (ref 2.3–3.5)
GLUCOSE SERPL-MCNC: 121 MG/DL (ref 70–99)
HCT VFR BLD AUTO: 42.4 % (ref 35.8–46.3)
HGB BLD-MCNC: 14.2 G/DL (ref 11.7–15.4)
IMM GRANULOCYTES # BLD AUTO: 0.13 K/UL (ref 0–0.5)
IMM GRANULOCYTES NFR BLD AUTO: 1.5 % (ref 0–5)
INR PPP: 1.6
LYMPHOCYTES # BLD: 2.09 K/UL (ref 0.5–4.6)
LYMPHOCYTES NFR BLD: 24.9 % (ref 13–44)
MCH RBC QN AUTO: 28.5 PG (ref 26.1–32.9)
MCHC RBC AUTO-ENTMCNC: 33.5 G/DL (ref 31.4–35)
MCV RBC AUTO: 85 FL (ref 82–102)
MONOCYTES # BLD: 0.83 K/UL (ref 0.1–1.3)
MONOCYTES NFR BLD: 9.9 % (ref 4–12)
NEUTS SEG # BLD: 5.2 K/UL (ref 1.7–8.2)
NEUTS SEG NFR BLD: 61.9 % (ref 43–78)
NRBC # BLD: 0 K/UL (ref 0–0.2)
PLATELET # BLD AUTO: 184 K/UL (ref 150–450)
PMV BLD AUTO: 9.3 FL (ref 9.4–12.3)
POTASSIUM SERPL-SCNC: 4.1 MMOL/L (ref 3.5–5.1)
PROT SERPL-MCNC: 6.6 G/DL (ref 6.3–8.2)
PROTHROMBIN TIME: 19 SEC (ref 11.3–14.9)
RBC # BLD AUTO: 4.99 M/UL (ref 4.05–5.2)
SODIUM SERPL-SCNC: 140 MMOL/L (ref 136–145)
WBC # BLD AUTO: 8.4 K/UL (ref 4.3–11.1)

## 2025-02-07 PROCEDURE — 73552 X-RAY EXAM OF FEMUR 2/>: CPT

## 2025-02-07 PROCEDURE — 72125 CT NECK SPINE W/O DYE: CPT

## 2025-02-07 PROCEDURE — 85610 PROTHROMBIN TIME: CPT

## 2025-02-07 PROCEDURE — 93005 ELECTROCARDIOGRAM TRACING: CPT | Performed by: EMERGENCY MEDICINE

## 2025-02-07 PROCEDURE — 85025 COMPLETE CBC W/AUTO DIFF WBC: CPT

## 2025-02-07 PROCEDURE — 6360000002 HC RX W HCPCS: Performed by: EMERGENCY MEDICINE

## 2025-02-07 PROCEDURE — 73502 X-RAY EXAM HIP UNI 2-3 VIEWS: CPT

## 2025-02-07 PROCEDURE — 96374 THER/PROPH/DIAG INJ IV PUSH: CPT

## 2025-02-07 PROCEDURE — 96376 TX/PRO/DX INJ SAME DRUG ADON: CPT

## 2025-02-07 PROCEDURE — 80053 COMPREHEN METABOLIC PANEL: CPT

## 2025-02-07 PROCEDURE — 70450 CT HEAD/BRAIN W/O DYE: CPT

## 2025-02-07 PROCEDURE — 99285 EMERGENCY DEPT VISIT HI MDM: CPT

## 2025-02-07 PROCEDURE — 71045 X-RAY EXAM CHEST 1 VIEW: CPT

## 2025-02-07 RX ADMIN — FENTANYL CITRATE 50 MCG: 50 INJECTION, SOLUTION INTRAMUSCULAR; INTRAVENOUS at 23:59

## 2025-02-07 RX ADMIN — FENTANYL CITRATE 25 MCG: 50 INJECTION, SOLUTION INTRAMUSCULAR; INTRAVENOUS at 22:44

## 2025-02-07 ASSESSMENT — PAIN DESCRIPTION - ORIENTATION
ORIENTATION: RIGHT

## 2025-02-07 ASSESSMENT — PAIN DESCRIPTION - DESCRIPTORS
DESCRIPTORS: ACHING

## 2025-02-07 ASSESSMENT — PAIN SCALES - GENERAL
PAINLEVEL_OUTOF10: 8
PAINLEVEL_OUTOF10: 9
PAINLEVEL_OUTOF10: 9

## 2025-02-07 ASSESSMENT — PAIN DESCRIPTION - LOCATION
LOCATION: HIP;HEAD
LOCATION: HEAD;HIP
LOCATION: HEAD;HIP

## 2025-02-07 ASSESSMENT — PAIN DESCRIPTION - PAIN TYPE: TYPE: ACUTE PAIN

## 2025-02-07 ASSESSMENT — PAIN - FUNCTIONAL ASSESSMENT: PAIN_FUNCTIONAL_ASSESSMENT: 0-10

## 2025-02-08 ENCOUNTER — APPOINTMENT (OUTPATIENT)
Dept: GENERAL RADIOLOGY | Age: 85
DRG: 481 | End: 2025-02-08
Payer: MEDICARE

## 2025-02-08 ENCOUNTER — ANESTHESIA (OUTPATIENT)
Dept: SURGERY | Age: 85
End: 2025-02-08
Payer: MEDICARE

## 2025-02-08 ENCOUNTER — ANESTHESIA EVENT (OUTPATIENT)
Dept: SURGERY | Age: 85
End: 2025-02-08
Payer: MEDICARE

## 2025-02-08 PROBLEM — S72.001A CLOSED FRACTURE OF RIGHT HIP (HCC): Status: ACTIVE | Noted: 2025-02-08

## 2025-02-08 PROBLEM — I50.32 CHRONIC DIASTOLIC (CONGESTIVE) HEART FAILURE (HCC): Status: ACTIVE | Noted: 2025-02-08

## 2025-02-08 PROBLEM — S72.141G CLOSED 2-PART INTERTROCHANTERIC FRACTURE OF PROXIMAL END OF FEMUR WITH DELAYED HEALING, RIGHT: Status: ACTIVE | Noted: 2025-02-08

## 2025-02-08 LAB
EKG ATRIAL RATE: 0 BPM
EKG DIAGNOSIS: NORMAL
EKG P AXIS: 0 DEGREES
EKG P-R INTERVAL: 254 MS
EKG Q-T INTERVAL: 452 MS
EKG QRS DURATION: 141 MS
EKG QTC CALCULATION (BAZETT): 488 MS
EKG R AXIS: -48 DEGREES
EKG T AXIS: 96 DEGREES
EKG VENTRICULAR RATE: 70 BPM
INR PPP: 1.5
PROTHROMBIN TIME: 18.3 SEC (ref 11.3–14.9)

## 2025-02-08 PROCEDURE — 6360000002 HC RX W HCPCS: Performed by: ANESTHESIOLOGY

## 2025-02-08 PROCEDURE — 6370000000 HC RX 637 (ALT 250 FOR IP): Performed by: HOSPITALIST

## 2025-02-08 PROCEDURE — 2720000010 HC SURG SUPPLY STERILE: Performed by: ORTHOPAEDIC SURGERY

## 2025-02-08 PROCEDURE — 0QS604Z REPOSITION RIGHT UPPER FEMUR WITH INTERNAL FIXATION DEVICE, OPEN APPROACH: ICD-10-PCS | Performed by: ORTHOPAEDIC SURGERY

## 2025-02-08 PROCEDURE — 3700000000 HC ANESTHESIA ATTENDED CARE: Performed by: ORTHOPAEDIC SURGERY

## 2025-02-08 PROCEDURE — 85610 PROTHROMBIN TIME: CPT

## 2025-02-08 PROCEDURE — 6370000000 HC RX 637 (ALT 250 FOR IP): Performed by: INTERNAL MEDICINE

## 2025-02-08 PROCEDURE — 3600000005 HC SURGERY LEVEL 5 BASE: Performed by: ORTHOPAEDIC SURGERY

## 2025-02-08 PROCEDURE — 6360000002 HC RX W HCPCS: Performed by: ORTHOPAEDIC SURGERY

## 2025-02-08 PROCEDURE — 7100000001 HC PACU RECOVERY - ADDTL 15 MIN: Performed by: ORTHOPAEDIC SURGERY

## 2025-02-08 PROCEDURE — 2709999900 HC NON-CHARGEABLE SUPPLY: Performed by: ORTHOPAEDIC SURGERY

## 2025-02-08 PROCEDURE — 3600000015 HC SURGERY LEVEL 5 ADDTL 15MIN: Performed by: ORTHOPAEDIC SURGERY

## 2025-02-08 PROCEDURE — 36415 COLL VENOUS BLD VENIPUNCTURE: CPT

## 2025-02-08 PROCEDURE — 6370000000 HC RX 637 (ALT 250 FOR IP): Performed by: PHYSICIAN ASSISTANT

## 2025-02-08 PROCEDURE — 6360000002 HC RX W HCPCS

## 2025-02-08 PROCEDURE — C1769 GUIDE WIRE: HCPCS | Performed by: ORTHOPAEDIC SURGERY

## 2025-02-08 PROCEDURE — 3700000001 HC ADD 15 MINUTES (ANESTHESIA): Performed by: ORTHOPAEDIC SURGERY

## 2025-02-08 PROCEDURE — 93010 ELECTROCARDIOGRAM REPORT: CPT | Performed by: INTERNAL MEDICINE

## 2025-02-08 PROCEDURE — C1713 ANCHOR/SCREW BN/BN,TIS/BN: HCPCS | Performed by: ORTHOPAEDIC SURGERY

## 2025-02-08 PROCEDURE — 73502 X-RAY EXAM HIP UNI 2-3 VIEWS: CPT

## 2025-02-08 PROCEDURE — L1830 KO IMMOB CANVAS LONG PRE OTS: HCPCS | Performed by: ORTHOPAEDIC SURGERY

## 2025-02-08 PROCEDURE — 6370000000 HC RX 637 (ALT 250 FOR IP): Performed by: NURSE PRACTITIONER

## 2025-02-08 PROCEDURE — 1100000000 HC RM PRIVATE

## 2025-02-08 PROCEDURE — 2580000003 HC RX 258

## 2025-02-08 PROCEDURE — 99223 1ST HOSP IP/OBS HIGH 75: CPT | Performed by: INTERNAL MEDICINE

## 2025-02-08 PROCEDURE — 6360000002 HC RX W HCPCS: Performed by: HOSPITALIST

## 2025-02-08 PROCEDURE — 6370000000 HC RX 637 (ALT 250 FOR IP): Performed by: ANESTHESIOLOGY

## 2025-02-08 PROCEDURE — 2500000003 HC RX 250 WO HCPCS: Performed by: ORTHOPAEDIC SURGERY

## 2025-02-08 PROCEDURE — 6370000000 HC RX 637 (ALT 250 FOR IP): Performed by: ORTHOPAEDIC SURGERY

## 2025-02-08 PROCEDURE — 2500000003 HC RX 250 WO HCPCS

## 2025-02-08 PROCEDURE — 7100000000 HC PACU RECOVERY - FIRST 15 MIN: Performed by: ORTHOPAEDIC SURGERY

## 2025-02-08 PROCEDURE — 2500000003 HC RX 250 WO HCPCS: Performed by: HOSPITALIST

## 2025-02-08 PROCEDURE — 2580000003 HC RX 258: Performed by: ORTHOPAEDIC SURGERY

## 2025-02-08 DEVICE — SCREW BONE L36MM DIA5MM ST LCK W/ T25 STARDRV: Type: IMPLANTABLE DEVICE | Site: HIP | Status: FUNCTIONAL

## 2025-02-08 DEVICE — BOLT BONE L90MM DIA10MM GLD TI ALLOY FOR FEM NK SYS: Type: IMPLANTABLE DEVICE | Site: HIP | Status: FUNCTIONAL

## 2025-02-08 DEVICE — SCREW BONE L90MM DIA6.4MM BLU TI ALLOY ANTIROTATION T25: Type: IMPLANTABLE DEVICE | Site: HIP | Status: FUNCTIONAL

## 2025-02-08 DEVICE — PLATE BONE 1 H TI ALLOY FOR FEM NK SYS: Type: IMPLANTABLE DEVICE | Site: HIP | Status: FUNCTIONAL

## 2025-02-08 RX ORDER — SODIUM CHLORIDE 0.9 % (FLUSH) 0.9 %
5-40 SYRINGE (ML) INJECTION EVERY 12 HOURS SCHEDULED
Status: DISCONTINUED | OUTPATIENT
Start: 2025-02-08 | End: 2025-02-10 | Stop reason: HOSPADM

## 2025-02-08 RX ORDER — DEXAMETHASONE SODIUM PHOSPHATE 4 MG/ML
INJECTION, SOLUTION INTRA-ARTICULAR; INTRALESIONAL; INTRAMUSCULAR; INTRAVENOUS; SOFT TISSUE
Status: DISCONTINUED | OUTPATIENT
Start: 2025-02-08 | End: 2025-02-08 | Stop reason: SDUPTHER

## 2025-02-08 RX ORDER — SODIUM CHLORIDE 9 MG/ML
INJECTION, SOLUTION INTRAVENOUS PRN
Status: DISCONTINUED | OUTPATIENT
Start: 2025-02-08 | End: 2025-02-10 | Stop reason: HOSPADM

## 2025-02-08 RX ORDER — ONDANSETRON 4 MG/1
4 TABLET, ORALLY DISINTEGRATING ORAL EVERY 8 HOURS PRN
Status: DISCONTINUED | OUTPATIENT
Start: 2025-02-08 | End: 2025-02-08 | Stop reason: SDUPTHER

## 2025-02-08 RX ORDER — HYDRALAZINE HYDROCHLORIDE 20 MG/ML
10 INJECTION INTRAMUSCULAR; INTRAVENOUS EVERY 4 HOURS PRN
Status: DISCONTINUED | OUTPATIENT
Start: 2025-02-08 | End: 2025-02-10 | Stop reason: HOSPADM

## 2025-02-08 RX ORDER — ONDANSETRON 2 MG/ML
INJECTION INTRAMUSCULAR; INTRAVENOUS
Status: DISCONTINUED | OUTPATIENT
Start: 2025-02-08 | End: 2025-02-08 | Stop reason: SDUPTHER

## 2025-02-08 RX ORDER — CEFAZOLIN SODIUM 1 G/3ML
INJECTION, POWDER, FOR SOLUTION INTRAMUSCULAR; INTRAVENOUS
Status: DISCONTINUED | OUTPATIENT
Start: 2025-02-08 | End: 2025-02-08 | Stop reason: SDUPTHER

## 2025-02-08 RX ORDER — SODIUM CHLORIDE 0.9 % (FLUSH) 0.9 %
5-40 SYRINGE (ML) INJECTION PRN
Status: DISCONTINUED | OUTPATIENT
Start: 2025-02-08 | End: 2025-02-10 | Stop reason: HOSPADM

## 2025-02-08 RX ORDER — TRAZODONE HYDROCHLORIDE 50 MG/1
50 TABLET, FILM COATED ORAL NIGHTLY PRN
Status: DISCONTINUED | OUTPATIENT
Start: 2025-02-08 | End: 2025-02-10 | Stop reason: HOSPADM

## 2025-02-08 RX ORDER — EPHEDRINE SULFATE 5 MG/ML
INJECTION INTRAVENOUS
Status: DISCONTINUED | OUTPATIENT
Start: 2025-02-08 | End: 2025-02-08 | Stop reason: SDUPTHER

## 2025-02-08 RX ORDER — LABETALOL HYDROCHLORIDE 5 MG/ML
10 INJECTION, SOLUTION INTRAVENOUS EVERY 6 HOURS PRN
Status: DISCONTINUED | OUTPATIENT
Start: 2025-02-08 | End: 2025-02-08 | Stop reason: HOSPADM

## 2025-02-08 RX ORDER — POTASSIUM CHLORIDE 1500 MG/1
40 TABLET, EXTENDED RELEASE ORAL PRN
Status: DISCONTINUED | OUTPATIENT
Start: 2025-02-08 | End: 2025-02-10 | Stop reason: HOSPADM

## 2025-02-08 RX ORDER — TRAZODONE HYDROCHLORIDE 50 MG/1
50 TABLET, FILM COATED ORAL NIGHTLY PRN
Status: DISCONTINUED | OUTPATIENT
Start: 2025-02-09 | End: 2025-02-08

## 2025-02-08 RX ORDER — LIDOCAINE HYDROCHLORIDE 20 MG/ML
INJECTION, SOLUTION EPIDURAL; INFILTRATION; INTRACAUDAL; PERINEURAL
Status: DISCONTINUED | OUTPATIENT
Start: 2025-02-08 | End: 2025-02-08 | Stop reason: SDUPTHER

## 2025-02-08 RX ORDER — HYDROMORPHONE HYDROCHLORIDE 1 MG/ML
0.5 INJECTION, SOLUTION INTRAMUSCULAR; INTRAVENOUS; SUBCUTANEOUS
Status: DISCONTINUED | OUTPATIENT
Start: 2025-02-08 | End: 2025-02-08 | Stop reason: SDUPTHER

## 2025-02-08 RX ORDER — ACETAMINOPHEN 500 MG
1000 TABLET ORAL ONCE
Status: COMPLETED | OUTPATIENT
Start: 2025-02-08 | End: 2025-02-08

## 2025-02-08 RX ORDER — POLYETHYLENE GLYCOL 3350 17 G/17G
17 POWDER, FOR SOLUTION ORAL DAILY PRN
Status: DISCONTINUED | OUTPATIENT
Start: 2025-02-08 | End: 2025-02-10 | Stop reason: HOSPADM

## 2025-02-08 RX ORDER — PROPOFOL 10 MG/ML
INJECTION, EMULSION INTRAVENOUS
Status: DISCONTINUED | OUTPATIENT
Start: 2025-02-08 | End: 2025-02-08 | Stop reason: SDUPTHER

## 2025-02-08 RX ORDER — WARFARIN SODIUM 5 MG/1
5 TABLET ORAL
Status: DISCONTINUED | OUTPATIENT
Start: 2025-02-14 | End: 2025-02-10 | Stop reason: HOSPADM

## 2025-02-08 RX ORDER — SODIUM CHLORIDE, SODIUM LACTATE, POTASSIUM CHLORIDE, CALCIUM CHLORIDE 600; 310; 30; 20 MG/100ML; MG/100ML; MG/100ML; MG/100ML
INJECTION, SOLUTION INTRAVENOUS
Status: DISCONTINUED | OUTPATIENT
Start: 2025-02-08 | End: 2025-02-08 | Stop reason: SDUPTHER

## 2025-02-08 RX ORDER — KETOROLAC TROMETHAMINE 30 MG/ML
15 INJECTION, SOLUTION INTRAMUSCULAR; INTRAVENOUS ONCE
Status: COMPLETED | OUTPATIENT
Start: 2025-02-08 | End: 2025-02-08

## 2025-02-08 RX ORDER — ONDANSETRON 4 MG/1
4 TABLET, ORALLY DISINTEGRATING ORAL EVERY 8 HOURS PRN
Status: DISCONTINUED | OUTPATIENT
Start: 2025-02-08 | End: 2025-02-10 | Stop reason: HOSPADM

## 2025-02-08 RX ORDER — LOSARTAN POTASSIUM 50 MG/1
50 TABLET ORAL DAILY
Status: DISCONTINUED | OUTPATIENT
Start: 2025-02-09 | End: 2025-02-10 | Stop reason: HOSPADM

## 2025-02-08 RX ORDER — METOPROLOL SUCCINATE 25 MG/1
25 TABLET, EXTENDED RELEASE ORAL DAILY
Status: DISCONTINUED | OUTPATIENT
Start: 2025-02-08 | End: 2025-02-10 | Stop reason: HOSPADM

## 2025-02-08 RX ORDER — WARFARIN SODIUM 5 MG/1
5 TABLET ORAL DAILY
Status: DISCONTINUED | OUTPATIENT
Start: 2025-02-08 | End: 2025-02-08

## 2025-02-08 RX ORDER — ONDANSETRON 2 MG/ML
4 INJECTION INTRAMUSCULAR; INTRAVENOUS EVERY 6 HOURS PRN
Status: DISCONTINUED | OUTPATIENT
Start: 2025-02-08 | End: 2025-02-10 | Stop reason: HOSPADM

## 2025-02-08 RX ORDER — ONDANSETRON 2 MG/ML
4 INJECTION INTRAMUSCULAR; INTRAVENOUS EVERY 6 HOURS PRN
Status: DISCONTINUED | OUTPATIENT
Start: 2025-02-08 | End: 2025-02-08 | Stop reason: SDUPTHER

## 2025-02-08 RX ORDER — WARFARIN SODIUM 5 MG/1
2.5 TABLET ORAL
Status: DISCONTINUED | OUTPATIENT
Start: 2025-02-11 | End: 2025-02-10 | Stop reason: HOSPADM

## 2025-02-08 RX ORDER — ACETAMINOPHEN 325 MG/1
650 TABLET ORAL EVERY 6 HOURS PRN
Status: DISCONTINUED | OUTPATIENT
Start: 2025-02-08 | End: 2025-02-10 | Stop reason: HOSPADM

## 2025-02-08 RX ORDER — SOTALOL HYDROCHLORIDE 80 MG/1
120 TABLET ORAL EVERY 12 HOURS
Status: DISCONTINUED | OUTPATIENT
Start: 2025-02-08 | End: 2025-02-10 | Stop reason: HOSPADM

## 2025-02-08 RX ORDER — WARFARIN SODIUM 5 MG/1
5 TABLET ORAL DAILY
Status: DISCONTINUED | OUTPATIENT
Start: 2025-02-08 | End: 2025-02-10 | Stop reason: HOSPADM

## 2025-02-08 RX ORDER — MORPHINE SULFATE 4 MG/ML
4 INJECTION, SOLUTION INTRAMUSCULAR; INTRAVENOUS
Status: DISCONTINUED | OUTPATIENT
Start: 2025-02-08 | End: 2025-02-10 | Stop reason: HOSPADM

## 2025-02-08 RX ORDER — OXYCODONE HYDROCHLORIDE 5 MG/1
5 TABLET ORAL EVERY 4 HOURS PRN
Status: DISCONTINUED | OUTPATIENT
Start: 2025-02-08 | End: 2025-02-10 | Stop reason: HOSPADM

## 2025-02-08 RX ORDER — POTASSIUM CHLORIDE 7.45 MG/ML
10 INJECTION INTRAVENOUS PRN
Status: DISCONTINUED | OUTPATIENT
Start: 2025-02-08 | End: 2025-02-10 | Stop reason: HOSPADM

## 2025-02-08 RX ORDER — OXYCODONE HYDROCHLORIDE 5 MG/1
5 TABLET ORAL
Status: DISCONTINUED | OUTPATIENT
Start: 2025-02-08 | End: 2025-02-08

## 2025-02-08 RX ORDER — PHENYLEPHRINE HYDROCHLORIDE 10 MG/ML
INJECTION INTRAVENOUS
Status: DISCONTINUED | OUTPATIENT
Start: 2025-02-08 | End: 2025-02-08 | Stop reason: SDUPTHER

## 2025-02-08 RX ORDER — ACETAMINOPHEN 650 MG/1
650 SUPPOSITORY RECTAL EVERY 6 HOURS PRN
Status: DISCONTINUED | OUTPATIENT
Start: 2025-02-08 | End: 2025-02-10 | Stop reason: HOSPADM

## 2025-02-08 RX ORDER — MAGNESIUM SULFATE IN WATER 40 MG/ML
2000 INJECTION, SOLUTION INTRAVENOUS PRN
Status: DISCONTINUED | OUTPATIENT
Start: 2025-02-08 | End: 2025-02-10 | Stop reason: HOSPADM

## 2025-02-08 RX ADMIN — WATER 2000 MG: 1 INJECTION INTRAMUSCULAR; INTRAVENOUS; SUBCUTANEOUS at 19:59

## 2025-02-08 RX ADMIN — OXYCODONE 5 MG: 5 TABLET ORAL at 01:40

## 2025-02-08 RX ADMIN — WARFARIN SODIUM 5 MG: 5 TABLET ORAL at 18:04

## 2025-02-08 RX ADMIN — CEFAZOLIN SODIUM 2 G: 1 INJECTION, POWDER, FOR SOLUTION INTRAMUSCULAR; INTRAVENOUS at 10:55

## 2025-02-08 RX ADMIN — TRAZODONE HYDROCHLORIDE 50 MG: 50 TABLET ORAL at 23:45

## 2025-02-08 RX ADMIN — EPHEDRINE SULFATE 10 MG: 5 INJECTION INTRAVENOUS at 11:05

## 2025-02-08 RX ADMIN — OXYCODONE HYDROCHLORIDE 5 MG: 5 TABLET ORAL at 18:04

## 2025-02-08 RX ADMIN — SODIUM CHLORIDE, PRESERVATIVE FREE 10 ML: 5 INJECTION INTRAVENOUS at 20:02

## 2025-02-08 RX ADMIN — HYDROMORPHONE HYDROCHLORIDE 0.5 MG: 1 INJECTION, SOLUTION INTRAMUSCULAR; INTRAVENOUS; SUBCUTANEOUS at 02:39

## 2025-02-08 RX ADMIN — PHENYLEPHRINE HYDROCHLORIDE 200 MCG: 10 INJECTION INTRAVENOUS at 10:51

## 2025-02-08 RX ADMIN — EPHEDRINE SULFATE 10 MG: 5 INJECTION INTRAVENOUS at 11:23

## 2025-02-08 RX ADMIN — PROPOFOL 100 MCG/KG/MIN: 10 INJECTION, EMULSION INTRAVENOUS at 10:53

## 2025-02-08 RX ADMIN — PHENYLEPHRINE HYDROCHLORIDE 200 MCG: 10 INJECTION INTRAVENOUS at 11:00

## 2025-02-08 RX ADMIN — ONDANSETRON 4 MG: 2 INJECTION, SOLUTION INTRAMUSCULAR; INTRAVENOUS at 11:14

## 2025-02-08 RX ADMIN — LIDOCAINE HYDROCHLORIDE 100 MG: 20 INJECTION, SOLUTION EPIDURAL; INFILTRATION; INTRACAUDAL; PERINEURAL at 10:51

## 2025-02-08 RX ADMIN — PROPOFOL 150 MG: 10 INJECTION, EMULSION INTRAVENOUS at 10:51

## 2025-02-08 RX ADMIN — SODIUM CHLORIDE, PRESERVATIVE FREE 10 ML: 5 INJECTION INTRAVENOUS at 20:01

## 2025-02-08 RX ADMIN — FENTANYL CITRATE 50 MCG: 0.05 INJECTION, SOLUTION INTRAMUSCULAR; INTRAVENOUS at 10:21

## 2025-02-08 RX ADMIN — SOTALOL HYDROCHLORIDE 120 MG: 80 TABLET ORAL at 14:14

## 2025-02-08 RX ADMIN — PHENYLEPHRINE HYDROCHLORIDE 200 MCG: 10 INJECTION INTRAVENOUS at 11:07

## 2025-02-08 RX ADMIN — PHENYLEPHRINE HYDROCHLORIDE 200 MCG: 10 INJECTION INTRAVENOUS at 11:05

## 2025-02-08 RX ADMIN — KETOROLAC TROMETHAMINE 15 MG: 30 INJECTION, SOLUTION INTRAMUSCULAR at 10:14

## 2025-02-08 RX ADMIN — PHYTONADIONE 10 MG: 10 INJECTION, EMULSION INTRAMUSCULAR; INTRAVENOUS; SUBCUTANEOUS at 01:03

## 2025-02-08 RX ADMIN — ACETAMINOPHEN 1000 MG: 500 TABLET, FILM COATED ORAL at 10:13

## 2025-02-08 RX ADMIN — MORPHINE SULFATE 4 MG: 4 INJECTION INTRAVENOUS at 20:00

## 2025-02-08 RX ADMIN — METOPROLOL SUCCINATE 25 MG: 25 TABLET, EXTENDED RELEASE ORAL at 14:15

## 2025-02-08 RX ADMIN — ONDANSETRON 4 MG: 2 INJECTION, SOLUTION INTRAMUSCULAR; INTRAVENOUS at 20:00

## 2025-02-08 RX ADMIN — ONDANSETRON 4 MG: 2 INJECTION, SOLUTION INTRAMUSCULAR; INTRAVENOUS at 02:40

## 2025-02-08 RX ADMIN — PHENYLEPHRINE HYDROCHLORIDE 200 MCG: 10 INJECTION INTRAVENOUS at 10:56

## 2025-02-08 RX ADMIN — SODIUM CHLORIDE, SODIUM LACTATE, POTASSIUM CHLORIDE, AND CALCIUM CHLORIDE: 600; 310; 30; 20 INJECTION, SOLUTION INTRAVENOUS at 10:44

## 2025-02-08 RX ADMIN — DEXAMETHASONE SODIUM PHOSPHATE 4 MG: 4 INJECTION INTRA-ARTICULAR; INTRALESIONAL; INTRAMUSCULAR; INTRAVENOUS; SOFT TISSUE at 11:14

## 2025-02-08 ASSESSMENT — PAIN DESCRIPTION - ORIENTATION
ORIENTATION: RIGHT

## 2025-02-08 ASSESSMENT — PAIN DESCRIPTION - ONSET
ONSET: GRADUAL
ONSET: ON-GOING
ONSET: ON-GOING

## 2025-02-08 ASSESSMENT — PAIN DESCRIPTION - FREQUENCY
FREQUENCY: CONTINUOUS
FREQUENCY: CONTINUOUS
FREQUENCY: INTERMITTENT

## 2025-02-08 ASSESSMENT — PAIN SCALES - GENERAL
PAINLEVEL_OUTOF10: 8
PAINLEVEL_OUTOF10: 4
PAINLEVEL_OUTOF10: 7
PAINLEVEL_OUTOF10: 4
PAINLEVEL_OUTOF10: 0
PAINLEVEL_OUTOF10: 4
PAINLEVEL_OUTOF10: 8
PAINLEVEL_OUTOF10: 8

## 2025-02-08 ASSESSMENT — PAIN - FUNCTIONAL ASSESSMENT
PAIN_FUNCTIONAL_ASSESSMENT: 0-10
PAIN_FUNCTIONAL_ASSESSMENT: ACTIVITIES ARE NOT PREVENTED

## 2025-02-08 ASSESSMENT — PAIN DESCRIPTION - PAIN TYPE
TYPE: ACUTE PAIN

## 2025-02-08 ASSESSMENT — PAIN DESCRIPTION - DESCRIPTORS
DESCRIPTORS: ACHING;BURNING;DISCOMFORT
DESCRIPTORS: ACHING;BURNING;DISCOMFORT;SORE

## 2025-02-08 ASSESSMENT — PAIN DESCRIPTION - LOCATION
LOCATION: HIP
LOCATION: HEAD;HIP
LOCATION: INCISION
LOCATION: HIP
LOCATION: HIP;HEAD

## 2025-02-08 NOTE — OP NOTE
Operative Report    Patient: Nancy Omalley MRN: [id number]  SSN: [id number]    YOB: 1940  Age: 84 y.o.  Sex: female       Date of Surgery: February 8, 2025     History:  Nancy Omalley is a 84 y.o. female who fell and injured her right hip.  She was seen in the emergency room and she was given the diagnosis of intertrochanteric fracture and her original x-rays were read out as negative or sort of equivocal.  She did not have an intertrochanteric fracture and her x-rays were pretty obviously and classic for a right femoral neck fracture.  It was reasonably well aligned up so I thought he could be treated with operative intervention with plate and screw fixation.  I did try to explain to the patient's family as well as the patient herself sort of the pluses minuses of either proceeding with some sort of arthroplasty solution or fixation.  They seem to feel comfortable consenting to the operative fixation.      I talked to the patient and/or their representative and explained the exact nature the procedure.  I also went through a detailed list of the material risks associated with  the procedure which included risk of bleeding, infection, injury to nearby structures, worsening the situation, as well as the risks associate with anesthesia and finally death.  Also talked with him regarding the benefits and alternatives to the procedure.    Preoperative Diagnosis: Right closed displaced femoral neck fracture    Postoperative Diagnosis:   Right closed displaced femoral neck fracture      Surgeons and Role:     * Henrique Byers MD - Primary    Anesthesia: General     Procedure: Open treatment of right femoral neck fracture with plate and screw fixation    Procedure in Detail: After the successful induction of general anesthesia the right lower extremity was placed in boot traction on a fracture table.  At that point we made sure we could visualize the hip on both the AP and lateral projection

## 2025-02-08 NOTE — PERIOP NOTE
TRANSFER - OUT REPORT:    Verbal report given to Prisca MOREL on Nancy Omalley  being transferred to [facility] for routine post-op       Report consisted of patient’s Situation, Background, Assessment and   Recommendations(SBAR).     Information from the following report(s) Nurse Handoff Report, Surgery Report, Cardiac Rhythm V paced, and Neuro Assessment was reviewed with the receiving nurse.    Lines:   Peripheral IV 02/07/25 Left;Proximal Antecubital (Active)   Site Assessment Clean, dry & intact 02/08/25 1129   Line Status Infusing 02/08/25 1129   Line Care Connections checked and tightened 02/08/25 1129   Phlebitis Assessment No symptoms 02/08/25 1129   Infiltration Assessment 0 02/08/25 1129   Alcohol Cap Used No 02/08/25 0911   Dressing Status Clean, dry & intact 02/08/25 1129   Dressing Type Transparent 02/08/25 1129        Opportunity for questions and clarification was provided.      Patient transported with:   O2 @ 2 liters  Tech    VTE prophylaxis orders have been written for Nancy Omalley.

## 2025-02-08 NOTE — ED NOTES
TRANSFER - OUT REPORT:    Verbal report given to Loli MOREL on Nancy Omalley  being transferred to  for routine progression of patient care       Report consisted of patient's Situation, Background, Assessment and   Recommendations(SBAR).     Information from the following report(s) Nurse Handoff Report was reviewed with the receiving nurse.    [place] Fall Assessment:                           Lines:   Peripheral IV 02/07/25 Left;Proximal Antecubital (Active)        Opportunity for questions and clarification was provided.      Patient transported with:  Registered Nurse           [place], Kiesha, RN  02/08/25 0131

## 2025-02-08 NOTE — ED PROVIDER NOTES
a dedicated plain film of the right hip has been    performed.         FINDINGS:   Nondisplaced hairline fracture seen through the neck of the right hip not   excluded. Bone mineralization decreased. Soft tissues are edematous. Joint   spaces are maintained.       IMPRESSION:   Nondisplaced hairline fracture through the right hip not excluded. CT scan   recommended for further evaluation.      Electronically signed by Radha Mix      Final   Unremarkable for acute fracture.         Electronically signed by Radha Mix      CT HEAD WO CONTRAST   Final Result   1.  No CT evidence of acute intracranial abnormality.   2.  No evidence of cervical spine fracture            Electronically signed by Mohan Esposito      CT CERVICAL SPINE WO CONTRAST   Final Result   1.  No CT evidence of acute intracranial abnormality.   2.  No evidence of cervical spine fracture            Electronically signed by Mohan Esposito                   No results for input(s): \"COVID19\" in the last 72 hours.     Voice dictation software was used during the making of this note.  This software is not perfect and grammatical and other typographical errors may be present.  This note has not been completely proofread for errors.     Dimitrios Turner Jr., MD  02/08/25 0206       Dimitrios Turner Jr., MD  02/08/25 0217

## 2025-02-08 NOTE — INTERVAL H&P NOTE
Update History & Physical    The Patient's History and Physical of 2/7/2025 was reviewed with the patient and I examined the patient.  There was no change.  The surgical site was confirmed by the patient and me.    Plan:  The risk, benefits, expected outcome, and alternative to the recommended procedure have been discussed with the patient.  Patient understands and wants to proceed with open treatment of right femoral neck fracture with plate and screw fixation.    Electronically signed by Henrique Byers MD on 2/8/2025 at 7:08 AM

## 2025-02-08 NOTE — H&P
to be started status post surgical intervention  Code status: Full Code        Non-peripheral Lines and Tubes (if present):             Hospital Problems:  Principal Problem:    Closed 2-part intertrochanteric fracture of proximal end of femur with delayed healing, right  Active Problems:    Long term (current) use of anticoagulants    Hypertension    Congestive heart failure (CHF) (HCC)    Coronary atherosclerosis    Dyslipidemia    Paroxysmal atrial fibrillation (HCC)  Resolved Problems:    * No resolved hospital problems. *        Objective:   Patient Vitals for the past 24 hrs:   Pulse Resp BP SpO2   02/07/25 2142 -- -- (!) 173/86 95 %   02/07/25 2132 69 20 (!) 186/89 96 %       Oxygen Therapy  SpO2: 95 %  Pulse via Oximetry: 70 beats per minute  O2 Device: None (Room air)    Estimated body mass index is 22.3 kg/m² as calculated from the following:    Height as of this encounter: 1.651 m (5' 5\").    Weight as of this encounter: 60.8 kg (134 lb).  No intake or output data in the 24 hours ending 02/08/25 0023      Physical Exam:    General:    Looks anxious  Head:  Normocephalic, atraumatic  Eyes:  Sclerae appear normal.  Pupils equally round.  ENT:  Nares appear normal.  Moist oral mucosa  Neck:  No restricted ROM.  Trachea midline   CV:   RRR.  No m/r/g.  No jugular venous distension.  Lungs:   CTAB.  No wheezing, rhonchi, or rales.  Symmetric expansion.  Abdomen:   Soft, nontender, nondistended.  Extremities: No cyanosis or clubbing.  No edema  Skin:     No rashes.  Normal coloration.   Warm and dry.    Neuro:  Alert oriented x 3  Orders Placed This Encounter   Medications    fentaNYL (SUBLIMAZE) injection 25 mcg    phytonadione (ADULT) (VITAMIN K) 10 mg in sodium chloride 0.9 % 100 mL IVPB    fentaNYL (SUBLIMAZE) injection 50 mcg    labetalol (NORMODYNE;TRANDATE) injection 10 mg    hydrALAZINE (APRESOLINE) injection 10 mg    sodium chloride flush 0.9 % injection 5-40 mL    sodium chloride flush 0.9 % injection

## 2025-02-08 NOTE — ED TRIAGE NOTES
Patient arrives via EMS from a [employer], tripped on carpet, fell on R side, hit head, denies LOC. Takes warfarin. Outward rotation on R leg. No meds given.

## 2025-02-08 NOTE — ANESTHESIA PRE PROCEDURE
Department of Anesthesiology  Preprocedure Note       Name:  Nancy Omalley   Age:  84 y.o.  :  1940                                          MRN:  [id number]         Date:  2025      Surgeon: Surgeon(s):  Henrique Byers MD    Procedure: Procedure(s):  FEMUR OPEN REDUCTION INTERNAL FIXATION, Right FNS    Medications prior to admission:   Prior to Admission medications    Medication Sig Start Date End Date Taking? Authorizing Provider   warfarin (COUMADIN) 2.5 MG tablet Take 1 tablet by mouth daily 24  Yes Chester Sharpe MD   metoprolol succinate (TOPROL XL) 25 MG extended release tablet Take 1 tablet by mouth in the morning and at bedtime 24  Yes Chester Sharpe MD   empagliflozin (JARDIANCE) 10 MG tablet Take 1 tablet by mouth daily 24  Yes Chester Sharpe MD   sotalol (BETAPACE) 120 MG tablet Take 1 tablet by mouth 2 times daily 24  Yes Chester Sharep MD   colestipol (COLESTID) 1 g tablet Take 1-2 tablets by mouth daily 24 Yes Shelby Greco MD   Cyanocobalamin 1000 MCG/ML KIT Inject 1,000 mcg into the muscle every 30 days 23  Yes Shelby Greco MD   levothyroxine (SYNTHROID) 137 MCG tablet  10/18/23  Yes Shelby Greco MD   losartan-hydroCHLOROthiazide (HYZAAR) 50-12.5 MG per tablet Take 0.5 tablets by mouth daily  Patient taking differently: Take 1 tablet by mouth daily 23  Yes Chester Sharpe MD   nitroGLYCERIN (NITROSTAT) 0.4 MG SL tablet Place 1 tablet under the tongue every 5 minutes as needed for Chest pain 10/17/23  Yes Chester Sharpe MD   ondansetron (ZOFRAN-ODT) 4 MG disintegrating tablet Take 1 tablet by mouth 3 times daily as needed 22  Yes Shelby Greco MD   acetaminophen (TYLENOL) 500 MG tablet Take by mouth every 6 hours as needed   Yes Automatic Reconciliation, Ar   Cholecalciferol 50 MCG (2000 UT) CAPS Take 2,000 Int'l Units by mouth daily 7/20/10  Yes

## 2025-02-08 NOTE — ANESTHESIA POSTPROCEDURE EVALUATION
Department of Anesthesiology  Postprocedure Note    Patient: Nancy Omalley  MRN: [id number]  YOB: 1940  Date of evaluation: 2/8/2025    Procedure Summary       Date: 02/08/25 Room / Location: [facility] MAIN OR  / [facility] MAIN OR    Anesthesia Start: 1043 Anesthesia Stop: 1130    Procedure: FEMUR OPEN REDUCTION INTERNAL FIXATION, Right FNS (Right: Hip) Diagnosis:       Closed fracture of right hip requiring operative repair, initial encounter ([facility])      (Closed fracture of right hip requiring operative repair, initial encounter ([facility]) [S72.001A])    Providers: Henrique Byers MD Responsible Provider:     Anesthesia Type: general ASA Status: 3            Anesthesia Type: No value filed.    Kim Phase I: Kim Score: 9    Kim Phase II:      Anesthesia Post Evaluation    Patient location during evaluation: PACU  Patient participation: complete - patient participated  Level of consciousness: awake and alert  Airway patency: patent  Nausea & Vomiting: no nausea and no vomiting  Cardiovascular status: hemodynamically stable  Respiratory status: acceptable, nonlabored ventilation and spontaneous ventilation  Hydration status: euvolemic  Comments: /75   Pulse 75   Temp 97.5 °F (36.4 °C) (Axillary)   Resp 16   Ht 1.651 m (5' 5\")   Wt 60.8 kg (134 lb)   SpO2 99%   BMI 22.30 kg/m²     Multimodal analgesia pain management approach  Pain management: adequate and satisfactory to patient    No notable events documented.

## 2025-02-08 NOTE — PLAN OF CARE
Problem: Chronic Conditions and Co-morbidities  Goal: Patient's chronic conditions and co-morbidity symptoms are monitored and maintained or improved  Outcome: Progressing  Flowsheets (Taken 2/8/2025 0135)  Care Plan - Patient's Chronic Conditions and Co-Morbidity Symptoms are Monitored and Maintained or Improved:   Monitor and assess patient's chronic conditions and comorbid symptoms for stability, deterioration, or improvement   Collaborate with multidisciplinary team to address chronic and comorbid conditions and prevent exacerbation or deterioration     Problem: Discharge Planning  Goal: Discharge to home or other facility with appropriate resources  Outcome: Progressing  Flowsheets (Taken 2/8/2025 0135)  Discharge to home or other facility with appropriate resources:   Identify barriers to discharge with patient and caregiver   Arrange for needed discharge resources and transportation as appropriate     Problem: Pain  Goal: Verbalizes/displays adequate comfort level or baseline comfort level  Outcome: Progressing     Problem: Skin/Tissue Integrity  Goal: Skin integrity remains intact  Description: 1.  Monitor for areas of redness and/or skin breakdown  2.  Assess vascular access sites hourly  3.  Every 4-6 hours minimum:  Change oxygen saturation probe site  4.  Every 4-6 hours:  If on nasal continuous positive airway pressure, respiratory therapy assess nares and determine need for appliance change or resting period  Outcome: Progressing

## 2025-02-08 NOTE — PERIOP NOTE
TRANSFER - IN REPORT:    Verbal report received from MARIA TERESA Cope on Nanyc Bales Styles being received from [facility] for ordered procedure      Report consisted of patient’s Situation, Background, Assessment and Recommendations(SBAR).     Information from the following report(s) SBAR, Kardex, ED Summary, Intake/Output, MAR, Recent Results, Med Rec Status, Procedure Verification, and Quality Measures was reviewed with the receiving nurse.    Opportunity for questions and clarification was provided.      Assessment completed upon patient’s arrival to unit and care assumed.

## 2025-02-08 NOTE — ANESTHESIA PROCEDURE NOTES
Airway  Date/Time: 2/8/2025 10:52 AM  Urgency: elective    Airway not difficult    General Information and Staff    Patient location during procedure: OR  Performed: resident/CRNA   Performed by: Heron Billy Jr., APRN - CRNA  Authorized by: Heron Billy Jr., APRN - CRNA      Indications and Patient Condition  Indications for airway management: anesthesia  Spontaneous ventilation: present  Sedation level: deep  Preoxygenated: yes  Patient position: sniffing  MILS not maintained throughout  Mask difficulty assessment: not attempted    Final Airway Details  Final airway type: supraglottic airway      Successful airway: oropharyngeal  Size 4     Number of attempts at approach: 1  Ventilation between attempts: supraglottic airway  Number of other approaches attempted: 0    Additional Comments  Lips and dentition the same  no

## 2025-02-09 LAB
ALBUMIN SERPL-MCNC: 2.8 G/DL (ref 3.2–4.6)
ALBUMIN/GLOB SERPL: 0.9 (ref 1–1.9)
ALP SERPL-CCNC: 53 U/L (ref 35–104)
ALT SERPL-CCNC: 14 U/L (ref 8–45)
ANION GAP SERPL CALC-SCNC: 12 MMOL/L (ref 7–16)
AST SERPL-CCNC: 26 U/L (ref 15–37)
BASOPHILS # BLD: 0.02 K/UL (ref 0–0.2)
BASOPHILS NFR BLD: 0.2 % (ref 0–2)
BILIRUB SERPL-MCNC: 0.4 MG/DL (ref 0–1.2)
BUN SERPL-MCNC: 31 MG/DL (ref 8–23)
CALCIUM SERPL-MCNC: 9.3 MG/DL (ref 8.8–10.2)
CHLORIDE SERPL-SCNC: 100 MMOL/L (ref 98–107)
CO2 SERPL-SCNC: 25 MMOL/L (ref 20–29)
CREAT SERPL-MCNC: 1.01 MG/DL (ref 0.6–1.1)
DIFFERENTIAL METHOD BLD: ABNORMAL
EOSINOPHIL # BLD: 0.01 K/UL (ref 0–0.8)
EOSINOPHIL NFR BLD: 0.1 % (ref 0.5–7.8)
ERYTHROCYTE [DISTWIDTH] IN BLOOD BY AUTOMATED COUNT: 16.1 % (ref 11.9–14.6)
GLOBULIN SER CALC-MCNC: 3.1 G/DL (ref 2.3–3.5)
GLUCOSE BLD STRIP.AUTO-MCNC: 160 MG/DL (ref 65–100)
GLUCOSE SERPL-MCNC: 129 MG/DL (ref 70–99)
HCT VFR BLD AUTO: 39.8 % (ref 35.8–46.3)
HGB BLD-MCNC: 12.7 G/DL (ref 11.7–15.4)
IMM GRANULOCYTES # BLD AUTO: 0.05 K/UL (ref 0–0.5)
IMM GRANULOCYTES NFR BLD AUTO: 0.5 % (ref 0–5)
INR PPP: 1.2
LYMPHOCYTES # BLD: 0.94 K/UL (ref 0.5–4.6)
LYMPHOCYTES NFR BLD: 9.6 % (ref 13–44)
MCH RBC QN AUTO: 28.5 PG (ref 26.1–32.9)
MCHC RBC AUTO-ENTMCNC: 31.9 G/DL (ref 31.4–35)
MCV RBC AUTO: 89.4 FL (ref 82–102)
MONOCYTES # BLD: 0.79 K/UL (ref 0.1–1.3)
MONOCYTES NFR BLD: 8.1 % (ref 4–12)
NEUTS SEG # BLD: 7.96 K/UL (ref 1.7–8.2)
NEUTS SEG NFR BLD: 81.5 % (ref 43–78)
NRBC # BLD: 0 K/UL (ref 0–0.2)
PLATELET # BLD AUTO: 165 K/UL (ref 150–450)
PMV BLD AUTO: 10.3 FL (ref 9.4–12.3)
POTASSIUM SERPL-SCNC: 4.1 MMOL/L (ref 3.5–5.1)
PROT SERPL-MCNC: 5.8 G/DL (ref 6.3–8.2)
PROTHROMBIN TIME: 15.1 SEC (ref 11.3–14.9)
RBC # BLD AUTO: 4.45 M/UL (ref 4.05–5.2)
SERVICE CMNT-IMP: ABNORMAL
SODIUM SERPL-SCNC: 137 MMOL/L (ref 136–145)
WBC # BLD AUTO: 9.8 K/UL (ref 4.3–11.1)

## 2025-02-09 PROCEDURE — 99232 SBSQ HOSP IP/OBS MODERATE 35: CPT | Performed by: INTERNAL MEDICINE

## 2025-02-09 PROCEDURE — 1100000003 HC PRIVATE W/ TELEMETRY

## 2025-02-09 PROCEDURE — 6370000000 HC RX 637 (ALT 250 FOR IP): Performed by: PHYSICIAN ASSISTANT

## 2025-02-09 PROCEDURE — 85025 COMPLETE CBC W/AUTO DIFF WBC: CPT

## 2025-02-09 PROCEDURE — 6370000000 HC RX 637 (ALT 250 FOR IP): Performed by: NURSE PRACTITIONER

## 2025-02-09 PROCEDURE — 2500000003 HC RX 250 WO HCPCS: Performed by: HOSPITALIST

## 2025-02-09 PROCEDURE — 6360000002 HC RX W HCPCS: Performed by: ORTHOPAEDIC SURGERY

## 2025-02-09 PROCEDURE — 6370000000 HC RX 637 (ALT 250 FOR IP): Performed by: INTERNAL MEDICINE

## 2025-02-09 PROCEDURE — 6370000000 HC RX 637 (ALT 250 FOR IP): Performed by: HOSPITALIST

## 2025-02-09 PROCEDURE — 97535 SELF CARE MNGMENT TRAINING: CPT

## 2025-02-09 PROCEDURE — 2500000003 HC RX 250 WO HCPCS: Performed by: ORTHOPAEDIC SURGERY

## 2025-02-09 PROCEDURE — 85610 PROTHROMBIN TIME: CPT

## 2025-02-09 PROCEDURE — 36415 COLL VENOUS BLD VENIPUNCTURE: CPT

## 2025-02-09 PROCEDURE — 80053 COMPREHEN METABOLIC PANEL: CPT

## 2025-02-09 PROCEDURE — 97530 THERAPEUTIC ACTIVITIES: CPT

## 2025-02-09 PROCEDURE — 97161 PT EVAL LOW COMPLEX 20 MIN: CPT

## 2025-02-09 PROCEDURE — 97165 OT EVAL LOW COMPLEX 30 MIN: CPT

## 2025-02-09 PROCEDURE — 82962 GLUCOSE BLOOD TEST: CPT

## 2025-02-09 PROCEDURE — 6370000000 HC RX 637 (ALT 250 FOR IP): Performed by: ORTHOPAEDIC SURGERY

## 2025-02-09 RX ADMIN — SOTALOL HYDROCHLORIDE 120 MG: 80 TABLET ORAL at 08:01

## 2025-02-09 RX ADMIN — TRAZODONE HYDROCHLORIDE 50 MG: 50 TABLET ORAL at 20:46

## 2025-02-09 RX ADMIN — ACETAMINOPHEN 650 MG: 325 TABLET ORAL at 06:26

## 2025-02-09 RX ADMIN — SODIUM CHLORIDE, PRESERVATIVE FREE 10 ML: 5 INJECTION INTRAVENOUS at 20:53

## 2025-02-09 RX ADMIN — WARFARIN SODIUM 5 MG: 5 TABLET ORAL at 17:13

## 2025-02-09 RX ADMIN — OXYCODONE HYDROCHLORIDE 5 MG: 5 TABLET ORAL at 17:20

## 2025-02-09 RX ADMIN — SODIUM CHLORIDE, PRESERVATIVE FREE 10 ML: 5 INJECTION INTRAVENOUS at 07:57

## 2025-02-09 RX ADMIN — WATER 2000 MG: 1 INJECTION INTRAMUSCULAR; INTRAVENOUS; SUBCUTANEOUS at 06:25

## 2025-02-09 RX ADMIN — SODIUM CHLORIDE, PRESERVATIVE FREE 10 ML: 5 INJECTION INTRAVENOUS at 08:01

## 2025-02-09 ASSESSMENT — PAIN DESCRIPTION - DESCRIPTORS
DESCRIPTORS: SORE
DESCRIPTORS: ACHING
DESCRIPTORS: ACHING

## 2025-02-09 ASSESSMENT — PAIN DESCRIPTION - ORIENTATION
ORIENTATION: RIGHT

## 2025-02-09 ASSESSMENT — PAIN DESCRIPTION - ONSET: ONSET: ON-GOING

## 2025-02-09 ASSESSMENT — PAIN - FUNCTIONAL ASSESSMENT
PAIN_FUNCTIONAL_ASSESSMENT: ACTIVITIES ARE NOT PREVENTED
PAIN_FUNCTIONAL_ASSESSMENT: ACTIVITIES ARE NOT PREVENTED

## 2025-02-09 ASSESSMENT — PAIN DESCRIPTION - LOCATION
LOCATION: HIP;LEG
LOCATION: HIP
LOCATION: HIP

## 2025-02-09 ASSESSMENT — PAIN DESCRIPTION - FREQUENCY: FREQUENCY: INTERMITTENT

## 2025-02-09 ASSESSMENT — PAIN SCALES - GENERAL
PAINLEVEL_OUTOF10: 5
PAINLEVEL_OUTOF10: 4
PAINLEVEL_OUTOF10: 0
PAINLEVEL_OUTOF10: 5
PAINLEVEL_OUTOF10: 0

## 2025-02-09 ASSESSMENT — PAIN DESCRIPTION - PAIN TYPE: TYPE: SURGICAL PAIN

## 2025-02-09 NOTE — PLAN OF CARE
Problem: Chronic Conditions and Co-morbidities  Goal: Patient's chronic conditions and co-morbidity symptoms are monitored and maintained or improved  2/9/2025 1449 by Lisa Tang RN  Outcome: Progressing  Flowsheets (Taken 2/9/2025 0745)  Care Plan - Patient's Chronic Conditions and Co-Morbidity Symptoms are Monitored and Maintained or Improved: Monitor and assess patient's chronic conditions and comorbid symptoms for stability, deterioration, or improvement  2/9/2025 0451 by Danica Ashley, RN  Outcome: Progressing  Flowsheets (Taken 2/8/2025 2000)  Care Plan - Patient's Chronic Conditions and Co-Morbidity Symptoms are Monitored and Maintained or Improved:   Monitor and assess patient's chronic conditions and comorbid symptoms for stability, deterioration, or improvement   Collaborate with multidisciplinary team to address chronic and comorbid conditions and prevent exacerbation or deterioration     Problem: Discharge Planning  Goal: Discharge to home or other facility with appropriate resources  2/9/2025 1449 by Lisa Tang, RN  Outcome: Progressing  Flowsheets (Taken 2/9/2025 0745)  Discharge to home or other facility with appropriate resources: Identify barriers to discharge with patient and caregiver  2/9/2025 0451 by Danica Ashley, RN  Outcome: Progressing  Flowsheets (Taken 2/8/2025 2000)  Discharge to home or other facility with appropriate resources:   Identify barriers to discharge with patient and caregiver   Arrange for needed discharge resources and transportation as appropriate     Problem: Pain  Goal: Verbalizes/displays adequate comfort level or baseline comfort level  2/9/2025 1449 by Lisa Tang, RN  Outcome: Progressing  Flowsheets (Taken 2/9/2025 0745)  Verbalizes/displays adequate comfort level or baseline comfort level: Encourage patient to monitor pain and request assistance  2/9/2025 0451 by Danica Ashley, RN  Outcome: Progressing     Problem: Skin/Tissue

## 2025-02-09 NOTE — CARE COORDINATION
CM met with patient / spouse (Don) at bedside and discussed discharge plan and needs.  Patient alert/orient x4.  Patient verified demographic, no changes needed.  Patient states she had a fall.  States she has been independent with her ADL's and iADL's and do has DME (cane / standard Walker) available in the home if needed.  States she was an active  with driving herself to appointment / family and community events.  CM discussed with patient therapy recommendation.  Patient was agreeable to a referral being made to [facility].  Referral completed for [facility].  CM will continue to follow and remain available for any needs that may arise.         02/09/25 1217   Service Assessment   Patient Orientation Alert and Oriented;Person;Place;Situation;Self   Cognition Alert   History Provided By Patient;Medical Record   Primary Caregiver Self   Support Systems Spouse/Significant Other   Patient's Healthcare Decision Maker is: Legal Next of Kin   PCP Verified by CM Yes   Last Visit to PCP Within last 3 months   Prior Functional Level Independent in ADLs/IADLs   Current Functional Level Assistance with the following:   Can patient return to prior living arrangement Yes   Ability to make needs known: Good   Family able to assist with home care needs: Yes   Would you like for me to discuss the discharge plan with any other family members/significant others, and if so, who? Yes   Financial Resources Medicare   Community Resources None   Social/Functional History   Lives With Spouse   Type of Home House   Home Layout One level   Home Access Stairs to enter without rails   Entrance Stairs - Number of Steps 1   Bathroom Shower/Tub Walk-in shower;Tub/Shower unit;Curtain;Shower chair with back   Bathroom Toilet Standard   Bathroom Equipment Hand-held shower;Commode;3-in-1 Commode   Bathroom Accessibility Accessible   Home Equipment Cane;Walker - Standard   Receives Help From Family   Prior Level of Assist for ADLs Independent   Prior Level

## 2025-02-09 NOTE — PLAN OF CARE
Problem: Chronic Conditions and Co-morbidities  Goal: Patient's chronic conditions and co-morbidity symptoms are monitored and maintained or improved  Outcome: Progressing  Flowsheets (Taken 2/8/2025 2000)  Care Plan - Patient's Chronic Conditions and Co-Morbidity Symptoms are Monitored and Maintained or Improved:   Monitor and assess patient's chronic conditions and comorbid symptoms for stability, deterioration, or improvement   Collaborate with multidisciplinary team to address chronic and comorbid conditions and prevent exacerbation or deterioration     Problem: Discharge Planning  Goal: Discharge to home or other facility with appropriate resources  Outcome: Progressing  Flowsheets (Taken 2/8/2025 2000)  Discharge to home or other facility with appropriate resources:   Identify barriers to discharge with patient and caregiver   Arrange for needed discharge resources and transportation as appropriate     Problem: Pain  Goal: Verbalizes/displays adequate comfort level or baseline comfort level  Outcome: Progressing     Problem: Skin/Tissue Integrity  Goal: Skin integrity remains intact  Description: 1.  Monitor for areas of redness and/or skin breakdown  2.  Assess vascular access sites hourly  3.  Every 4-6 hours minimum:  Change oxygen saturation probe site  4.  Every 4-6 hours:  If on nasal continuous positive airway pressure, respiratory therapy assess nares and determine need for appliance change or resting period  Outcome: Progressing  Flowsheets (Taken 2/8/2025 2000)  Skin Integrity Remains Intact: Monitor for areas of redness and/or skin breakdown     Problem: Safety - Adult  Goal: Free from fall injury  Outcome: Progressing     Problem: ABCDS Injury Assessment  Goal: Absence of physical injury  Outcome: Progressing

## 2025-02-09 NOTE — THERAPY EVALUATION
ACUTE PHYSICAL THERAPY GOALS:   (Developed with and agreed upon by patient and/or caregiver.)    (1.) Nancy Omalley  will move from supine to sit and sit to supine  and scoot up and down with MODIFIED INDEPENDENCE within 7 treatment day(s).    (2.) Nancy Omalley will transfer from bed to chair and chair to bed with SUPERVISION using the least restrictive device within 7 treatment day(s).    (3.) Nancy Omalley will perform therapeutic exercises x 15 min for HEP with INDEPENDENCE to improve strength, endurance, and functional mobility within 7 treatment day(s).   (4.) Nancy Omalley will self propel in WC x 150 ft with SPV      PHYSICAL THERAPY Initial Assessment, Daily Note, and PM  (Link to Caseload Tracking: PT Visit Days : 1  Acknowledge Orders  Time In/Out  PT Charge Capture  Rehab Caseload Tracker    WBAT R LE for transfers only    Nancy Omalley is a 84 y.o. female   PRIMARY DIAGNOSIS: Closed 2-part intertrochanteric fracture of proximal end of femur with delayed healing, right  Closed 2-part intertrochanteric fracture of proximal end of femur with delayed healing, right [S72.141G]  Procedure(s) (LRB):  FEMUR OPEN REDUCTION INTERNAL FIXATION, Right FNS (Right)  1 Day Post-Op  Reason for Referral: Generalized Muscle Weakness (M62.81)  Other abnormalities of gait and mobility (R26.89)  Inpatient: Payor: MEDICARE / Plan: MEDICARE PART A AND B / Product Type: *No Product type* /     ASSESSMENT:     REHAB RECOMMENDATIONS:   Recommendation to date pending progress:  Setting:  Inpatient Rehab Facility    Equipment:     WC     ASSESSMENT:  Ms. Omalley is an 84 year old female who presents to [facility] after suffering fall resulting in R femur IT fracture. She is s/p ORIF R hip. She is WBAT for transfers only. At baseline, pt is independent with functional mobility and ADLs. This date pt performs mobility including sit <> stand transfers with RW and mod A. She took 3-4 steps each trial with RW and mod

## 2025-02-10 ENCOUNTER — HOSPITAL ENCOUNTER (INPATIENT)
Age: 85
LOS: 11 days | Discharge: NURSING, REHAB OR LONG-TERM CARE | DRG: 949 | End: 2025-02-21
Attending: STUDENT IN AN ORGANIZED HEALTH CARE EDUCATION/TRAINING PROGRAM | Admitting: STUDENT IN AN ORGANIZED HEALTH CARE EDUCATION/TRAINING PROGRAM
Payer: MEDICARE

## 2025-02-10 ENCOUNTER — APPOINTMENT (OUTPATIENT)
Dept: CT IMAGING | Age: 85
DRG: 481 | End: 2025-02-10
Payer: MEDICARE

## 2025-02-10 ENCOUNTER — ANTI-COAG VISIT (OUTPATIENT)
Age: 85
End: 2025-02-10

## 2025-02-10 VITALS
OXYGEN SATURATION: 95 % | RESPIRATION RATE: 16 BRPM | HEART RATE: 75 BPM | BODY MASS INDEX: 22.33 KG/M2 | DIASTOLIC BLOOD PRESSURE: 55 MMHG | TEMPERATURE: 98.2 F | WEIGHT: 134 LBS | HEIGHT: 65 IN | SYSTOLIC BLOOD PRESSURE: 118 MMHG

## 2025-02-10 DIAGNOSIS — I48.0 PAROXYSMAL ATRIAL FIBRILLATION (HCC): ICD-10-CM

## 2025-02-10 DIAGNOSIS — S72.001A CLOSED FRACTURE OF RIGHT HIP, INITIAL ENCOUNTER (HCC): ICD-10-CM

## 2025-02-10 DIAGNOSIS — Z79.01 LONG TERM (CURRENT) USE OF ANTICOAGULANTS: Primary | ICD-10-CM

## 2025-02-10 PROBLEM — S06.9XAA MILD TRAUMATIC BRAIN INJURY: Status: ACTIVE | Noted: 2025-02-10

## 2025-02-10 PROBLEM — S06.9X0S: Status: ACTIVE | Noted: 2025-02-10

## 2025-02-10 PROBLEM — Z51.89 ENCOUNTER FOR OTHER SPECIFIED AFTERCARE: Status: ACTIVE | Noted: 2025-02-10

## 2025-02-10 PROBLEM — R26.9 GAIT ABNORMALITY: Status: ACTIVE | Noted: 2025-02-10

## 2025-02-10 PROBLEM — Z78.9 DECREASED ACTIVITIES OF DAILY LIVING (ADL): Status: ACTIVE | Noted: 2025-02-10

## 2025-02-10 PROBLEM — Z51.89 ENCOUNTER FOR OTHER SPECIFIED AFTERCARE: Status: RESOLVED | Noted: 2025-02-10 | Resolved: 2025-02-10

## 2025-02-10 LAB
ANION GAP SERPL CALC-SCNC: 10 MMOL/L (ref 7–16)
BASOPHILS # BLD: 0.05 K/UL (ref 0–0.2)
BASOPHILS NFR BLD: 0.6 % (ref 0–2)
BUN SERPL-MCNC: 28 MG/DL (ref 8–23)
CALCIUM SERPL-MCNC: 9.2 MG/DL (ref 8.8–10.2)
CHLORIDE SERPL-SCNC: 103 MMOL/L (ref 98–107)
CO2 SERPL-SCNC: 25 MMOL/L (ref 20–29)
CREAT SERPL-MCNC: 0.73 MG/DL (ref 0.6–1.1)
DIFFERENTIAL METHOD BLD: ABNORMAL
EOSINOPHIL # BLD: 0.18 K/UL (ref 0–0.8)
EOSINOPHIL NFR BLD: 2.2 % (ref 0.5–7.8)
ERYTHROCYTE [DISTWIDTH] IN BLOOD BY AUTOMATED COUNT: 16.2 % (ref 11.9–14.6)
GLUCOSE BLD STRIP.AUTO-MCNC: 118 MG/DL (ref 65–100)
GLUCOSE SERPL-MCNC: 139 MG/DL (ref 70–99)
HCT VFR BLD AUTO: 40.3 % (ref 35.8–46.3)
HGB BLD-MCNC: 12.7 G/DL (ref 11.7–15.4)
IMM GRANULOCYTES # BLD AUTO: 0.08 K/UL (ref 0–0.5)
IMM GRANULOCYTES NFR BLD AUTO: 1 % (ref 0–5)
INR PPP: 1.2
LYMPHOCYTES # BLD: 1.39 K/UL (ref 0.5–4.6)
LYMPHOCYTES NFR BLD: 17.2 % (ref 13–44)
MAGNESIUM SERPL-MCNC: 1.8 MG/DL (ref 1.8–2.4)
MCH RBC QN AUTO: 28.7 PG (ref 26.1–32.9)
MCHC RBC AUTO-ENTMCNC: 31.5 G/DL (ref 31.4–35)
MCV RBC AUTO: 91.2 FL (ref 82–102)
MONOCYTES # BLD: 0.92 K/UL (ref 0.1–1.3)
MONOCYTES NFR BLD: 11.4 % (ref 4–12)
NEUTS SEG # BLD: 5.48 K/UL (ref 1.7–8.2)
NEUTS SEG NFR BLD: 67.6 % (ref 43–78)
NRBC # BLD: 0 K/UL (ref 0–0.2)
PLATELET # BLD AUTO: 138 K/UL (ref 150–450)
PMV BLD AUTO: 9.8 FL (ref 9.4–12.3)
POTASSIUM SERPL-SCNC: 3.5 MMOL/L (ref 3.5–5.1)
PROTHROMBIN TIME: 15.1 SEC (ref 11.3–14.9)
RBC # BLD AUTO: 4.42 M/UL (ref 4.05–5.2)
SERVICE CMNT-IMP: ABNORMAL
SODIUM SERPL-SCNC: 138 MMOL/L (ref 136–145)
WBC # BLD AUTO: 8.1 K/UL (ref 4.3–11.1)

## 2025-02-10 PROCEDURE — 70450 CT HEAD/BRAIN W/O DYE: CPT

## 2025-02-10 PROCEDURE — 36415 COLL VENOUS BLD VENIPUNCTURE: CPT

## 2025-02-10 PROCEDURE — 97530 THERAPEUTIC ACTIVITIES: CPT

## 2025-02-10 PROCEDURE — 83735 ASSAY OF MAGNESIUM: CPT

## 2025-02-10 PROCEDURE — 85025 COMPLETE CBC W/AUTO DIFF WBC: CPT

## 2025-02-10 PROCEDURE — 80048 BASIC METABOLIC PNL TOTAL CA: CPT

## 2025-02-10 PROCEDURE — 6370000000 HC RX 637 (ALT 250 FOR IP): Performed by: STUDENT IN AN ORGANIZED HEALTH CARE EDUCATION/TRAINING PROGRAM

## 2025-02-10 PROCEDURE — 82962 GLUCOSE BLOOD TEST: CPT

## 2025-02-10 PROCEDURE — 97535 SELF CARE MNGMENT TRAINING: CPT

## 2025-02-10 PROCEDURE — 1180000000 HC REHAB R&B

## 2025-02-10 PROCEDURE — 2500000003 HC RX 250 WO HCPCS: Performed by: STUDENT IN AN ORGANIZED HEALTH CARE EDUCATION/TRAINING PROGRAM

## 2025-02-10 PROCEDURE — 2500000003 HC RX 250 WO HCPCS: Performed by: ORTHOPAEDIC SURGERY

## 2025-02-10 PROCEDURE — 6370000000 HC RX 637 (ALT 250 FOR IP): Performed by: PHYSICIAN ASSISTANT

## 2025-02-10 PROCEDURE — 6370000000 HC RX 637 (ALT 250 FOR IP): Performed by: NURSE PRACTITIONER

## 2025-02-10 PROCEDURE — 85610 PROTHROMBIN TIME: CPT

## 2025-02-10 PROCEDURE — 99221 1ST HOSP IP/OBS SF/LOW 40: CPT | Performed by: STUDENT IN AN ORGANIZED HEALTH CARE EDUCATION/TRAINING PROGRAM

## 2025-02-10 PROCEDURE — 99231 SBSQ HOSP IP/OBS SF/LOW 25: CPT | Performed by: INTERNAL MEDICINE

## 2025-02-10 PROCEDURE — 97112 NEUROMUSCULAR REEDUCATION: CPT

## 2025-02-10 RX ORDER — WARFARIN SODIUM 5 MG/1
5 TABLET ORAL DAILY
Status: COMPLETED | OUTPATIENT
Start: 2025-02-10 | End: 2025-02-10

## 2025-02-10 RX ORDER — ACETAMINOPHEN 325 MG/1
650 TABLET ORAL EVERY 4 HOURS PRN
Status: DISCONTINUED | OUTPATIENT
Start: 2025-02-10 | End: 2025-02-11

## 2025-02-10 RX ORDER — WARFARIN SODIUM 5 MG/1
TABLET ORAL
Qty: 30 TABLET | Refills: 0 | Status: ON HOLD | OUTPATIENT
Start: 2025-02-11

## 2025-02-10 RX ORDER — TRAZODONE HYDROCHLORIDE 50 MG/1
50 TABLET, FILM COATED ORAL NIGHTLY PRN
Qty: 30 TABLET | Refills: 0 | Status: ON HOLD
Start: 2025-02-10

## 2025-02-10 RX ORDER — OXYCODONE HYDROCHLORIDE 5 MG/1
5 TABLET ORAL EVERY 4 HOURS PRN
Qty: 9 TABLET | Refills: 0 | Status: ON HOLD
Start: 2025-02-10 | End: 2025-02-13

## 2025-02-10 RX ORDER — SENNA AND DOCUSATE SODIUM 50; 8.6 MG/1; MG/1
1 TABLET, FILM COATED ORAL
Status: DISCONTINUED | OUTPATIENT
Start: 2025-02-10 | End: 2025-02-12

## 2025-02-10 RX ORDER — POLYETHYLENE GLYCOL 3350 17 G/17G
17 POWDER, FOR SOLUTION ORAL DAILY PRN
Status: CANCELLED | OUTPATIENT
Start: 2025-02-10

## 2025-02-10 RX ORDER — METOPROLOL SUCCINATE 25 MG/1
25 TABLET, EXTENDED RELEASE ORAL DAILY
Status: CANCELLED | OUTPATIENT
Start: 2025-02-11

## 2025-02-10 RX ORDER — BISACODYL 10 MG
10 SUPPOSITORY, RECTAL RECTAL DAILY PRN
Status: CANCELLED | OUTPATIENT
Start: 2025-02-10

## 2025-02-10 RX ORDER — CARBOXYMETHYLCELLULOSE SODIUM 10 MG/ML
1 GEL OPHTHALMIC 3 TIMES DAILY PRN
Status: CANCELLED | OUTPATIENT
Start: 2025-02-10

## 2025-02-10 RX ORDER — WARFARIN SODIUM 5 MG/1
2.5 TABLET ORAL
Status: CANCELLED | OUTPATIENT
Start: 2025-02-11

## 2025-02-10 RX ORDER — SOTALOL HYDROCHLORIDE 80 MG/1
120 TABLET ORAL EVERY 12 HOURS
Status: CANCELLED | OUTPATIENT
Start: 2025-02-10

## 2025-02-10 RX ORDER — BISACODYL 10 MG
10 SUPPOSITORY, RECTAL RECTAL DAILY PRN
Status: DISCONTINUED | OUTPATIENT
Start: 2025-02-10 | End: 2025-02-21 | Stop reason: HOSPADM

## 2025-02-10 RX ORDER — SENNA AND DOCUSATE SODIUM 50; 8.6 MG/1; MG/1
1 TABLET, FILM COATED ORAL
Status: CANCELLED | OUTPATIENT
Start: 2025-02-10

## 2025-02-10 RX ORDER — LOSARTAN POTASSIUM 50 MG/1
50 TABLET ORAL DAILY
Status: CANCELLED | OUTPATIENT
Start: 2025-02-11

## 2025-02-10 RX ORDER — WARFARIN SODIUM 5 MG/1
2.5 TABLET ORAL
Status: DISCONTINUED | OUTPATIENT
Start: 2025-02-11 | End: 2025-02-12

## 2025-02-10 RX ORDER — SOTALOL HYDROCHLORIDE 80 MG/1
120 TABLET ORAL EVERY 12 HOURS
Status: DISCONTINUED | OUTPATIENT
Start: 2025-02-10 | End: 2025-02-21 | Stop reason: HOSPADM

## 2025-02-10 RX ORDER — POLYETHYLENE GLYCOL 3350 17 G/17G
17 POWDER, FOR SOLUTION ORAL DAILY PRN
Status: DISCONTINUED | OUTPATIENT
Start: 2025-02-10 | End: 2025-02-13

## 2025-02-10 RX ORDER — SODIUM PHOSPHATE, DIBASIC AND SODIUM PHOSPHATE, MONOBASIC 7; 19 G/230ML; G/230ML
1 ENEMA RECTAL DAILY PRN
Status: CANCELLED | OUTPATIENT
Start: 2025-02-10

## 2025-02-10 RX ORDER — SODIUM PHOSPHATE, DIBASIC AND SODIUM PHOSPHATE, MONOBASIC 7; 19 G/230ML; G/230ML
1 ENEMA RECTAL DAILY PRN
Status: DISCONTINUED | OUTPATIENT
Start: 2025-02-10 | End: 2025-02-21 | Stop reason: HOSPADM

## 2025-02-10 RX ORDER — WARFARIN SODIUM 5 MG/1
5 TABLET ORAL
Status: DISCONTINUED | OUTPATIENT
Start: 2025-02-14 | End: 2025-02-12

## 2025-02-10 RX ORDER — TRAZODONE HYDROCHLORIDE 50 MG/1
50 TABLET, FILM COATED ORAL NIGHTLY PRN
Status: CANCELLED | OUTPATIENT
Start: 2025-02-10

## 2025-02-10 RX ORDER — LOSARTAN POTASSIUM 50 MG/1
50 TABLET ORAL DAILY
Status: DISCONTINUED | OUTPATIENT
Start: 2025-02-11 | End: 2025-02-17

## 2025-02-10 RX ORDER — CALCIUM CARBONATE 500 MG/1
500 TABLET, CHEWABLE ORAL 3 TIMES DAILY PRN
Status: DISCONTINUED | OUTPATIENT
Start: 2025-02-10 | End: 2025-02-21 | Stop reason: HOSPADM

## 2025-02-10 RX ORDER — CALCIUM CARBONATE 500 MG/1
500 TABLET, CHEWABLE ORAL 3 TIMES DAILY PRN
Status: CANCELLED | OUTPATIENT
Start: 2025-02-10

## 2025-02-10 RX ORDER — ONDANSETRON 4 MG/1
4 TABLET, ORALLY DISINTEGRATING ORAL EVERY 8 HOURS PRN
Status: DISCONTINUED | OUTPATIENT
Start: 2025-02-10 | End: 2025-02-21 | Stop reason: HOSPADM

## 2025-02-10 RX ORDER — METOPROLOL SUCCINATE 25 MG/1
25 TABLET, EXTENDED RELEASE ORAL DAILY
Status: DISCONTINUED | OUTPATIENT
Start: 2025-02-11 | End: 2025-02-19

## 2025-02-10 RX ORDER — WARFARIN SODIUM 5 MG/1
TABLET ORAL
Qty: 30 TABLET | Refills: 0 | Status: ON HOLD | OUTPATIENT
Start: 2025-02-10

## 2025-02-10 RX ORDER — WARFARIN SODIUM 5 MG/1
5 TABLET ORAL
Status: CANCELLED | OUTPATIENT
Start: 2025-02-14

## 2025-02-10 RX ORDER — WARFARIN SODIUM 5 MG/1
TABLET ORAL
Qty: 30 TABLET | Refills: 0 | Status: ON HOLD | OUTPATIENT
Start: 2025-02-14

## 2025-02-10 RX ORDER — OXYCODONE HYDROCHLORIDE 5 MG/1
5 TABLET ORAL EVERY 4 HOURS PRN
Status: CANCELLED | OUTPATIENT
Start: 2025-02-10

## 2025-02-10 RX ORDER — SODIUM CHLORIDE 0.9 % (FLUSH) 0.9 %
5-40 SYRINGE (ML) INJECTION PRN
Status: CANCELLED | OUTPATIENT
Start: 2025-02-10

## 2025-02-10 RX ORDER — SODIUM CHLORIDE 0.9 % (FLUSH) 0.9 %
5-40 SYRINGE (ML) INJECTION PRN
Status: DISCONTINUED | OUTPATIENT
Start: 2025-02-10 | End: 2025-02-21 | Stop reason: HOSPADM

## 2025-02-10 RX ORDER — SODIUM CHLORIDE 0.9 % (FLUSH) 0.9 %
5-40 SYRINGE (ML) INJECTION EVERY 12 HOURS SCHEDULED
Status: DISCONTINUED | OUTPATIENT
Start: 2025-02-10 | End: 2025-02-15

## 2025-02-10 RX ORDER — CARBOXYMETHYLCELLULOSE SODIUM 10 MG/ML
1 GEL OPHTHALMIC 3 TIMES DAILY PRN
Status: DISCONTINUED | OUTPATIENT
Start: 2025-02-10 | End: 2025-02-21 | Stop reason: HOSPADM

## 2025-02-10 RX ORDER — SODIUM CHLORIDE 0.9 % (FLUSH) 0.9 %
5-40 SYRINGE (ML) INJECTION EVERY 12 HOURS SCHEDULED
Status: CANCELLED | OUTPATIENT
Start: 2025-02-10

## 2025-02-10 RX ORDER — HYDRALAZINE HYDROCHLORIDE 10 MG/1
10 TABLET, FILM COATED ORAL 3 TIMES DAILY PRN
Status: DISCONTINUED | OUTPATIENT
Start: 2025-02-10 | End: 2025-02-21 | Stop reason: HOSPADM

## 2025-02-10 RX ORDER — BUTALBITAL, ACETAMINOPHEN AND CAFFEINE 50; 325; 40 MG/1; MG/1; MG/1
1 TABLET ORAL ONCE
Status: COMPLETED | OUTPATIENT
Start: 2025-02-10 | End: 2025-02-10

## 2025-02-10 RX ORDER — WARFARIN SODIUM 5 MG/1
5 TABLET ORAL DAILY
Status: CANCELLED | OUTPATIENT
Start: 2025-02-10 | End: 2025-02-11

## 2025-02-10 RX ORDER — ONDANSETRON 4 MG/1
4 TABLET, ORALLY DISINTEGRATING ORAL EVERY 8 HOURS PRN
Status: CANCELLED | OUTPATIENT
Start: 2025-02-10

## 2025-02-10 RX ORDER — HYDRALAZINE HYDROCHLORIDE 10 MG/1
10 TABLET, FILM COATED ORAL 3 TIMES DAILY PRN
Status: CANCELLED | OUTPATIENT
Start: 2025-02-10

## 2025-02-10 RX ORDER — ACETAMINOPHEN 325 MG/1
650 TABLET ORAL EVERY 4 HOURS PRN
Status: CANCELLED | OUTPATIENT
Start: 2025-02-10

## 2025-02-10 RX ORDER — MENTHOL/CAMPHOR/ALLANTOIN/PHE 0.6-0.5-1%
OINTMENT(EA) TOPICAL PRN
Status: DISCONTINUED | OUTPATIENT
Start: 2025-02-10 | End: 2025-02-21 | Stop reason: HOSPADM

## 2025-02-10 RX ORDER — OXYCODONE HYDROCHLORIDE 5 MG/1
5 TABLET ORAL EVERY 4 HOURS PRN
Status: DISCONTINUED | OUTPATIENT
Start: 2025-02-10 | End: 2025-02-21 | Stop reason: HOSPADM

## 2025-02-10 RX ORDER — TRAZODONE HYDROCHLORIDE 50 MG/1
50 TABLET ORAL NIGHTLY PRN
Status: DISCONTINUED | OUTPATIENT
Start: 2025-02-10 | End: 2025-02-21 | Stop reason: HOSPADM

## 2025-02-10 RX ADMIN — SODIUM CHLORIDE, PRESERVATIVE FREE 10 ML: 5 INJECTION INTRAVENOUS at 09:17

## 2025-02-10 RX ADMIN — DOCUSATE SODIUM 50 MG AND SENNOSIDES 8.6 MG 1 TABLET: 8.6; 5 TABLET, FILM COATED ORAL at 20:55

## 2025-02-10 RX ADMIN — BUTALBITAL, ACETAMINOPHEN, AND CAFFEINE 1 TABLET: 50; 325; 40 TABLET ORAL at 01:08

## 2025-02-10 RX ADMIN — TRAZODONE HYDROCHLORIDE 50 MG: 50 TABLET ORAL at 20:55

## 2025-02-10 RX ADMIN — OXYCODONE 5 MG: 5 TABLET ORAL at 20:55

## 2025-02-10 RX ADMIN — SOTALOL HYDROCHLORIDE 120 MG: 80 TABLET ORAL at 20:53

## 2025-02-10 RX ADMIN — METOPROLOL SUCCINATE 25 MG: 25 TABLET, EXTENDED RELEASE ORAL at 09:14

## 2025-02-10 RX ADMIN — SODIUM CHLORIDE, PRESERVATIVE FREE 10 ML: 5 INJECTION INTRAVENOUS at 21:00

## 2025-02-10 RX ADMIN — LOSARTAN POTASSIUM 50 MG: 50 TABLET, FILM COATED ORAL at 09:14

## 2025-02-10 RX ADMIN — SOTALOL HYDROCHLORIDE 120 MG: 80 TABLET ORAL at 09:14

## 2025-02-10 RX ADMIN — WARFARIN SODIUM 5 MG: 5 TABLET ORAL at 19:25

## 2025-02-10 ASSESSMENT — PAIN DESCRIPTION - LOCATION
LOCATION: HEAD
LOCATION: HIP
LOCATION: HIP

## 2025-02-10 ASSESSMENT — PAIN DESCRIPTION - DESCRIPTORS
DESCRIPTORS: ACHING;DISCOMFORT;SORE
DESCRIPTORS: SORE
DESCRIPTORS: ACHING

## 2025-02-10 ASSESSMENT — PAIN - FUNCTIONAL ASSESSMENT: PAIN_FUNCTIONAL_ASSESSMENT: ACTIVITIES ARE NOT PREVENTED

## 2025-02-10 ASSESSMENT — PAIN DESCRIPTION - ORIENTATION
ORIENTATION: RIGHT
ORIENTATION: RIGHT
ORIENTATION: RIGHT;PROXIMAL

## 2025-02-10 ASSESSMENT — PAIN SCALES - WONG BAKER: WONGBAKER_NUMERICALRESPONSE: NO HURT

## 2025-02-10 ASSESSMENT — PAIN SCALES - GENERAL
PAINLEVEL_OUTOF10: 7
PAINLEVEL_OUTOF10: 0
PAINLEVEL_OUTOF10: 4
PAINLEVEL_OUTOF10: 5
PAINLEVEL_OUTOF10: 2

## 2025-02-10 ASSESSMENT — PAIN DESCRIPTION - ONSET: ONSET: SUDDEN

## 2025-02-10 ASSESSMENT — PAIN DESCRIPTION - FREQUENCY: FREQUENCY: INTERMITTENT

## 2025-02-10 ASSESSMENT — PAIN DESCRIPTION - PAIN TYPE: TYPE: ACUTE PAIN

## 2025-02-10 NOTE — PRE-CERTIFICATION NOTE
Responsibilities: Yes  Prior Level of Assist for Transfers: Needs assistance  Active : Yes  Mode of Transportation: Car  Occupation: Retired   Has lack of transportation kept you from medical appointments, meetings, work, or from getting things needed for daily living? (Check all that apply.)  No    REHABILITATION DIAGNOSIS/PMH:  Primary Diagnosis: Closed 2-part intertrochanteric fracture of proximal end of femur with delayed healing, right [S72.141G]   Did patient have surgery? Yes: 2/08/25  ORIF R HIP  Risk for Clinical Complications/Co-Morbidities:   Past Medical History:   Diagnosis Date    Accelerated hypertension 8/20/2014    Aortic valve insufficiency 8/20/2014    Arrhythmia     at fib    Arthritis     Atrial fibrillation (HCC) 8/20/2014    CAD (coronary artery disease)     Cancer ([facility])     basal    Congestive heart failure (CHF) ([facility]) 10/29/2015    Coronary atherosclerosis 10/29/2015    GERD (gastroesophageal reflux disease)     Hypertension     Hypokalemia 8/20/2014    Nausea & vomiting     Pacemaker 8/20/2014    Thyroid disease     Unstable angina ([facility]) 8/20/2014       Has the patient had two or more falls in the past year or any fall with injury in the past year?   Yes  Did the patient have major surgery during the 100 days prior to admission?  Yes  Reviewed Lab and Diagnostic Reports From Current Admission: Yes  Current Rehab Issues: ADL dysfunction, bladder management, bowel management, carry over of therapy techniques, discharge planning, disease and co-morbidity management, gait/mobility dysfunction, medication management, nutrition and hydration management, ongoing assessment of safety, pain management, patient and family education, prevention of secondary complications, skin integrity management, precautions training as applicable, cognitive and communication management as applicable.   Physiatrist: Dr. Gonzalez    PRECAUTIONS:   Restrictions/Precautions: Fall Risk, Weight Bearing

## 2025-02-10 NOTE — PLAN OF CARE
Problem: Chronic Conditions and Co-morbidities  Goal: Patient's chronic conditions and co-morbidity symptoms are monitored and maintained or improved  Recent Flowsheet Documentation  Taken 2/10/2025 1630 by Bryn Naranjo RN  Care Plan - Patient's Chronic Conditions and Co-Morbidity Symptoms are Monitored and Maintained or Improved: Monitor and assess patient's chronic conditions and comorbid symptoms for stability, deterioration, or improvement     Problem: Chronic Conditions and Co-morbidities  Goal: Patient's chronic conditions and co-morbidity symptoms are monitored and maintained or improved  Recent Flowsheet Documentation  Taken 2/10/2025 1630 by Bryn Naranjo RN  Care Plan - Patient's Chronic Conditions and Co-Morbidity Symptoms are Monitored and Maintained or Improved: Monitor and assess patient's chronic conditions and comorbid symptoms for stability, deterioration, or improvement     Problem: Chronic Conditions and Co-morbidities  Goal: Patient's chronic conditions and co-morbidity symptoms are monitored and maintained or improved  Recent Flowsheet Documentation  Taken 2/10/2025 1630 by Bryn Naranjo RN  Care Plan - Patient's Chronic Conditions and Co-Morbidity Symptoms are Monitored and Maintained or Improved: Monitor and assess patient's chronic conditions and comorbid symptoms for stability, deterioration, or improvement

## 2025-02-10 NOTE — PROGRESS NOTES
[facility] CARDIOLOGY PROGRESS NOTE           2/9/2025 9:07 AM    Admit Date: 2/7/2025         Subjective: Heart rate and blood pressure well-controlled.  She had successful hip surgery yesterday without incident.  Warfarin has been restarted.    ROS:  Cardiovascular:  As noted above    Objective:      Vitals:    02/09/25 0426 02/09/25 0615 02/09/25 0745 02/09/25 0801   BP: (!) 95/55 102/67 (!) 92/52 (!) 92/52   Pulse: 70  75    Resp: 18  16    Temp: 97.7 °F (36.5 °C)  97.9 °F (36.6 °C)    TempSrc: Oral  Oral    SpO2: 100%  100%    Weight:       Height:             Physical Exam:  General: Well Developed, Well Nourished, No Acute Distress, Alert & Oriented x 3, Appropriate mood  Neck: supple, no JVD  Heart: S1S2 with RRR without murmurs or gallops  Lungs: Clear throughout auscultation bilaterally without adventitious sounds  Abd: soft, nontender, nondistended, with good bowel sounds  Ext: no edema bilaterally  Skin: warm and dry      Data Review:   Recent Labs     02/07/25  2157 02/08/25  0604 02/09/25  0703     --  137   K 4.1  --  4.1   BUN 23  --  31*   WBC 8.4  --  9.8   HGB 14.2  --  12.7   HCT 42.4  --  39.8     --  165   INR 1.6 1.5 1.2       No results for input(s): \"TNIPOC\" in the last 72 hours.    Invalid input(s): \"TROIQ\"        Assessment/Plan:     Principal Problem:    Closed 2-part intertrochanteric fracture of proximal end of femur with delayed healing, right  Active Problems:    Chronic diastolic (congestive) heart failure (HCC)    Closed fracture of right hip (HCC)    Long term (current) use of anticoagulants    Hypertension    Coronary atherosclerosis    Dyslipidemia    Paroxysmal atrial fibrillation (HCC)  Resolved Problems:    * No resolved hospital problems. *    A/P  1) Afib -continue sotalol 120 mg twice daily and beta-blocker.  Warfarin has been resumed goal INR 2-3.  2) systolic heart failure -continue long-acting beta-blocker metoprolol XL 25 mg daily 
                        [facility] CARDIOLOGY PROGRESS NOTE           2/10/2025 9:40 AM    Admit Date: 2/7/2025         Subjective: Continues to do well.  Blood pressure and heart rate are well-controlled.  Back on Coumadin pharmacy dosing.  INR remains low.    ROS:  Cardiovascular:  As noted above    Objective:      Vitals:    02/09/25 1759 02/09/25 1951 02/10/25 0436 02/10/25 0739   BP: 136/66 103/82 (!) 145/64 (!) 140/70   Pulse: 75 73 70 75   Resp:  18 18 17   Temp:  97.9 °F (36.6 °C) 97.9 °F (36.6 °C) 97.3 °F (36.3 °C)   TempSrc:  Oral Oral Oral   SpO2: 98% 100% 100% 100%   Weight:       Height:             Physical Exam:  General: Well Developed, Well Nourished, No Acute Distress, Alert & Oriented x 3, Appropriate mood  Neck: supple, no JVD  Heart: S1S2 with RRR without murmurs or gallops  Lungs: Clear throughout auscultation bilaterally without adventitious sounds  Abd: soft, nontender, nondistended, with good bowel sounds  Ext: no edema bilaterally  Skin: warm and dry      Data Review:   Recent Labs     02/09/25  0703 02/10/25  0506    138   K 4.1 3.5   MG  --  1.8   BUN 31* 28*   WBC 9.8 8.1   HGB 12.7 12.7   HCT 39.8 40.3    138*   INR 1.2 1.2       No results for input(s): \"TNIPOC\" in the last 72 hours.    Invalid input(s): \"TROIQ\"      Assessment/Plan:     Principal Problem:    Closed 2-part intertrochanteric fracture of proximal end of femur with delayed healing, right  Active Problems:    Chronic diastolic (congestive) heart failure (HCC)    Closed fracture of right hip (HCC)    Long term (current) use of anticoagulants    Hypertension    Coronary atherosclerosis    Dyslipidemia    Paroxysmal atrial fibrillation (HCC)  Resolved Problems:    * No resolved hospital problems. *    A/P  1) Afib -continue sotalol 120 mg twice daily and beta-blocker.  Warfarin has been resumed goal INR 2-3.  2) systolic heart failure -continue long-acting beta-blocker metoprolol XL 25 mg daily continue losartan 50 
       Hospitalist Progress Note   Admit Date:  2025  9:30 PM   Name:  Nancy Omalley   Age:  84 y.o.  Sex:  female  :  1940   MRN:  [id number]   Room:  [place] [address]    Presenting/Chief Complaint: Fall     Reason(s) for Admission: Closed 2-part intertrochanteric fracture of proximal end of femur with delayed healing, right [S72.141G]     Hospital Course:   Nancy Omalley is a 84 y.o. female with medical history of hypertension, paroxysmal atrial fibrillation on anticoagulation with Coumadin, coronary disease presented to emergency room after mechanical fall that occurred prior to arrival. No loss of consciousness. She was at a local high school performance and was living auditorium when she tripped and fell hitting her head side of a metal door, landed on right hip. Patient developed deformity of right lower extremity and not able to weight-bear. Patient was brought to emergency room, x-ray right hip shows evidence of intertrochanteric fracture, orthopedics was contacted who recommended hospitalist admission. Patient denies any chest pain, shortness of breath. Patient history of coronary disease and follows with our cardiology team. CT head did not show any intracranial hemorrhage but shows evidence of scalp hematoma.       Subjective & 24hr Events:   Mrs. Omalley is seen today with family in the room.  She reports that she is in much better pain control after the surgery.  She reports it has done well.  She is laying flat right now and resting.  She did ask if she could eat and I did let her know that a diet has been ordered and she certainly can eat.  The family also asked if she could sit up straight and I stated that this could happen in a bit but would like her to rest since she just had surgery.  She reports understanding.      Assessment & Plan:   Right hip fracture:   --Orthopedic consultation and wait for surgery at this morning.  -Cardiology consult consult for cardiology clearance, did see 
  Physician Progress Note      PATIENT:               MARC FIGUEROA  CSN #:                  [id number]  :                       1940  ADMIT DATE:       2025 9:30 PM  DISCH DATE:  RESPONDING  PROVIDER #:        Valentina East DO          QUERY TEXT:    Patient admitted with fall and right hip fracture, noted to have Paroxysmal   atrial fibrillation and is maintained on coumadin. If possible, please   document in progress notes and discharge summary if you are evaluating and/or   treating any of the following:?  ?  The medical record reflects the following:  Risk Factors: age 84, CAD, HTN, HFpEF,  right hip fracture  Clinical Indicators: Paroxysmal Afib  Treatment: Coumadin  Options provided:  -- Secondary hypercoagulable state in a patient with atrial fibrillation  -- Other - I will add my own diagnosis  -- Disagree - Not applicable / Not valid  -- Disagree - Clinically unable to determine / Unknown  -- Refer to Clinical Documentation Reviewer    PROVIDER RESPONSE TEXT:    This patient has secondary hypercoagulable state in a patient with atrial   fibrillation.    Query created by: Elsy Reynoso on 2/10/2025 1:41 PM      Electronically signed by:  Valentina East DO 2/10/2025 1:48 PM          
  TRANSFER - IN REPORT:    Verbal report received from MARIA TERESA Pop on Nancy Omalley  being received from ED for routine progression of patient care      Report consisted of patient’s Situation, Background, Assessment and   Recommendations(SBAR).     Information from the following report(s) Nurse Handoff Report was reviewed with the receiving nurse.    Opportunity for questions and clarification was provided.      Assessment completed upon patient’s arrival to unit and care assume        
4 Eyes Skin Assessment     NAME:  Nancy Omalley  YOB: 1940  MEDICAL RECORD NUMBER:  [id number]    The patient is being assessed for  Admission    I agree that at least one RN has performed a thorough Head to Toe Skin Assessment on the patient. ALL assessment sites listed below have been assessed.      Areas assessed by both nurses:    Head, Face, Ears, Shoulders, Back, Chest, Arms, Elbows, Hands, Sacrum. Buttock, Coccyx, Ischium, Legs. Feet and Heels, and Under Medical Devices         Does the Patient have a Wound? No noted wound(s)       Gui Prevention initiated by RN: Yes  Wound Care Orders initiated by RN: No    Pressure Injury (Stage 3,4, Unstageable, DTI, NWPT, and Complex wounds) if present, place Wound referral order by RN under : No    New Ostomies, if present place, Ostomy referral order under : No     Nurse 1 eSignature: Electronically signed by Danica Ashley RN on 2/8/25 at 5:20 AM EST    **SHARE this note so that the co-signing nurse can place an eSignature**    Nurse 2 eSignature: {Esignature:[id number]}   
Progress Note    Patient: Nancy Omalley MRN: [id number]  SSN: [id number]    YOB: 1940  Age: 85 y.o.  Sex: female      Admit Date: 2/7/2025    LOS: 2 days     Subjective:     Patient doing okay today. Would like to be moved upstairs.     Objective:     Vitals:    02/09/25 1951 02/10/25 0436 02/10/25 0739 02/10/25 1142   BP: 103/82 (!) 145/64 (!) 140/70 (!) 118/55   Pulse: 73 70 75 75   Resp: 18 18 17 16   Temp: 97.9 °F (36.6 °C) 97.9 °F (36.6 °C) 97.3 °F (36.3 °C) 98.2 °F (36.8 °C)   TempSrc: Oral Oral Oral Oral   SpO2: 100% 100% 100% 95%   Weight:       Height:            Intake and Output:  Current Shift: No intake/output data recorded.  Last three shifts: 02/08 1901 - 02/10 0700  In: 10 [I.V.:10]  Out: 850 [Urine:850]    alert and oriented to person place time and situation  Skin - dressing clean dry and intact. Moderate bruising.   Motor and sensory function intact in RIGHT LOWER extremity  Pulses palpable in RIGHT LOWER extremity       Lab/Data Review:  Recent Labs     02/10/25  0506   HGB 12.7   HCT 40.3          Assessment:   , POD #2 from right femoral neck system fixation for right femoral neck fracture which was an osteoporotic fracture and a bone that would have not fractured otherwise    Plan:     Patient may be weightbearing as tolerated for transfers only on the operative lower extremity.  They can be full active and passive range of motion of the  hip.  They can have dry dressing change starting on postoperative day 2 and then after that daily and as needed.  They can have their Coumadin restarted as DVT prophylaxis.  They will need to have orthopedic follow-up approximately 2 weeks after discharge.    Signed By: CAIO Villegas     February 10, 2025         
Progress Note    Patient: Nancy Omalley MRN: [id number]  SSN: [id number]    YOB: 1940  Age: 84 y.o.  Sex: female      Admit Date: 2/7/2025    LOS: 1 day     Subjective:     Patient reports that her pain is much better in her right hip.  She says she just really uncomfortable with her lower back to sort of sitting in 1 place    Objective:     Vitals:    02/08/25 1929 02/08/25 2000 02/09/25 0426 02/09/25 0615   BP: (!) 102/56  (!) 95/55 102/67   Pulse: 75  70    Resp: 22 18 18    Temp: 97.5 °F (36.4 °C)  97.7 °F (36.5 °C)    TempSrc: Oral  Oral    SpO2: 97%  100%    Weight:       Height:            Intake and Output:  Current Shift: No intake/output data recorded.  Last three shifts: 02/07 1901 - 02/09 0700  In: 510 [I.V.:510]  Out: 410 [Urine:400]    alert and oriented to person place time and situation  Skin - dressing clean dry and intact  Motor and sensory function intact in RIGHT LOWER extremity  Pulses palpable in RIGHT LOWER extremity       Lab/Data Review:  Recent Labs     02/07/25  2157   HGB 14.2   HCT 42.4          Assessment:   , POD #1 from right femoral neck system fixation for right femoral neck fracture which was an osteoporotic fracture and a bone that would have not fractured otherwise    Plan:     Patient may be weightbearing as tolerated for transfers only on the operative lower extremity.  They can be full active and passive range of motion of the  hip.  They can have dry dressing change starting on postoperative day 2 and then after that daily and as needed.  They can have their Coumadin restarted as DVT prophylaxis.  They will need to have orthopedic follow-up approximately 2 weeks after discharge.    Signed By: Henrique Byers MD     February 9, 2025         
TRANSFER - OUT REPORT:    Verbal report given to Bryn on Nancy Omalley  being transferred to 0 for routine progression of patient care       Report consisted of patient's Situation, Background, Assessment and   Recommendations(SBAR).     Information from the following report(s) Nurse Handoff Report was reviewed with the receiving nurse.           Lines:   Peripheral IV 02/07/25 Left;Proximal Antecubital (Active)   Site Assessment Clean, dry & intact 02/10/25 0914   Line Status Capped;Flushed 02/10/25 0914   Line Care Connections checked and tightened 02/10/25 0914   Phlebitis Assessment No symptoms 02/10/25 0914   Infiltration Assessment 0 02/10/25 0914   Alcohol Cap Used No 02/10/25 0914   Dressing Status Clean, dry & intact 02/10/25 0914   Dressing Type Transparent 02/10/25 0914        Opportunity for questions and clarification was provided.      Patient transported with:  Tech        
TRANSFER - OUT REPORT:    Verbal report given to MARIA TERESA Arias on Nancy Omalley  being transferred to Pre-op for routine progression of patient care       Report consisted of patient's Situation, Background, Assessment and   Recommendations(SBAR).     Information from the following report(s) Nurse Handoff Report was reviewed with the receiving nurse.           Lines:   Peripheral IV 02/07/25 Left;Proximal Antecubital (Active)   Site Assessment Clean, dry & intact 02/08/25 0135   Line Status Flushed;Capped 02/08/25 0135   Line Care Connections checked and tightened;Cap changed 02/08/25 0135   Phlebitis Assessment No symptoms 02/08/25 0135   Infiltration Assessment 0 02/08/25 0135   Alcohol Cap Used Yes 02/08/25 0135   Dressing Status Clean, dry & intact 02/08/25 0135   Dressing Type Transparent 02/08/25 0135        Opportunity for questions and clarification was provided.      Patient transported with:  Monitor       
Warfarin dosing per pharmacist    Nancy Omalley is a 84 y.o. female.    Height: 165.1 cm (5' 5\")  Weight - Scale: 60.8 kg (134 lb)    Indication:  Afib    Goal INR:  2-3    Home dose:  5 mg on Fridays and 2.5 mg all remaining days    Risk factors or significant drug interactions:  post-op    Other anticoagulants:  none    Lab Results   Component Value Date/Time    HGB 12.7 02/09/2025 07:03 AM    HGB 14.2 02/07/2025 09:57 PM    HGB 10.6 07/04/2021 04:02 AM     Lab Results   Component Value Date/Time    INR 1.2 02/09/2025 07:03 AM    INR 2.6 04/25/2022 11:35 AM        Daily Monitoring  Date  INR     Warfarin dose Notes  2/7  1.6  Held Vit K IV 10 mg  2/8  1.5  5 mg   2/9  1.2  5 mg       Pharmacy was consulted to assist with warfarin dosing while inpatient. The patients home regimen, indication, and INR goal were confirmed via reviewing an outpatient anticoagulation note. Of note the patients INR was subtherapeutic at their last appointment on 1.7 and again on admission at 1.6. Vitamin K 10 mg was administered overnight on 2/7 pre-operatively. Warfarin was resumed by ortho surgery on 2/8 POD0.    INR today is subtherapeutic at 1.2. Will give 5 mg of warfarin this evening. Given the recent administration of vitamin K I would expect some warfarin resistance over the next few days. Will plan to give x 3 boost doses of 5 mg followed by the resumption of the patients usual home regimen. Plan may be adjusted based on INR trend.    Daily INR, pharmacy will continue to follow.    Thank you,  Juve Hardin [facility]   
Warfarin dosing per pharmacist    Nancy Omalley is a 84 y.o. female.    Height: 165.1 cm (5' 5\")  Weight - Scale: 60.8 kg (134 lb)    Indication:  Afib    Goal INR:  2-3    Home dose:  5 mg on Fridays and 2.5 mg all remaining days    Risk factors or significant drug interactions:  post-op    Other anticoagulants:  none    Lab Results   Component Value Date/Time    HGB 14.2 02/07/2025 09:57 PM    HGB 10.6 07/04/2021 04:02 AM    HGB 10.9 07/03/2021 06:03 AM     Lab Results   Component Value Date/Time    INR 1.5 02/08/2025 06:04 AM    INR 2.6 04/25/2022 11:35 AM        Daily Monitoring  Date  INR     Warfarin dose Notes  2/7  1.6  Held Vit K IV 10 mg  2/8  1.5  5 mg        Pharmacy was consulted to assist with warfarin dosing while inpatient. The patients home regimen, indication, and INR goal were confirmed via reviewing an outpatient anticoagulation note. Of note the patients INR was subtherapeutic at their last appointment on 1.7 and again on admission at 1.6. Vitamin K 10 mg was administered overnight on 2/7 pre-operatively. Warfarin was resumed by ortho surgery on 2/8 POD0.    INR today is subtherapeutic at 1.5. Will give 5 mg of warfarin this evening. Given the recent administration of vitamin K I would expect some warfarin resistance over the next few days. Will plan to give x 3 boost doses of 5 mg followed by the resumption of the patients usual home regimen. Plan may be adjusted based on INR trend.    Daily INR, pharmacy will continue to follow.    Thank you,  Juve Hardin formerly [facility]   
Warfarin dosing per pharmacist    Nancy Omalley is a 85 y.o. female.    Height: 165.1 cm (5' 5\")  Weight - Scale: 60.8 kg (134 lb)    Indication:  Afib    Goal INR:  2-3    Home dose:  5 mg on Fridays and 2.5 mg all remaining days    Risk factors or significant drug interactions:  post-op hip fracture due to fall    Other anticoagulants:  none    Lab Results   Component Value Date/Time    HGB 12.7 02/10/2025 05:06 AM    HGB 12.7 02/09/2025 07:03 AM    HGB 14.2 02/07/2025 09:57 PM     Lab Results   Component Value Date/Time    INR 1.2 02/10/2025 05:06 AM    INR 2.6 04/25/2022 11:35 AM        Daily Monitoring  Date  INR     Warfarin dose Notes  2/7  1.6  Held Vit K IV 10 mg  2/8  1.5  5 mg   2/9  1.2  5 mg   2/10  1.2  5 mg      Pharmacy was consulted to assist with warfarin dosing while inpatient. The patients home regimen, indication, and INR goal were confirmed via reviewing an outpatient anticoagulation note. Of note the patients INR was subtherapeutic at their last appointment on 1.7 and again on admission at 1.6. Vitamin K 10 mg was administered overnight on 2/7 pre-operatively. Warfarin was resumed by ortho surgery on 2/8 POD0.    INR today is subtherapeutic at 1.2. Will give 5 mg of warfarin this evening.     Given the recent administration of vitamin K,  would expect some warfarin resistance over the next few days. Will consider total of 3 boost doses of 5 mg followed by the resumption of the patients usual home regimen. Plan may be adjusted based on INR trend.    Daily INR, pharmacy will continue to follow.    Thank you,  Kendell Heaton, [facility]   
House  Home Layout: One level  Home Access: Stairs to enter without rails  Entrance Stairs - Number of Steps: 1  Bathroom Shower/Tub: Walk-in shower, Tub/Shower unit, Curtain, Shower chair with back  Bathroom Toilet: Standard  Bathroom Equipment: Hand-held shower, Commode, 3-in-1 Commode  Bathroom Accessibility: Accessible  Home Equipment: Cane, Walker - Standard  Receives Help From: Family  Prior Level of Assist for ADLs: Independent  Prior Level of Assist for Homemaking: Independent  Homemaking Responsibilities: Yes  Prior Level of Assist for Transfers: Needs assistance  Active : Yes  Mode of Transportation: Car  Occupation: Retired  OBJECTIVE:     PAIN: VITALS / O2: PRECAUTION / LINES / DRAINS:   Pre Treatment:   5/10      Post Treatment: same  Vitals        Oxygen  RA External Catheter and IV    RESTRICTIONS/PRECAUTIONS:  Restrictions/Precautions  Restrictions/Precautions: Fall Risk, Weight Bearing  Lower Extremity Weight Bearing Restrictions  Right Lower Extremity Weight Bearing: Weight Bearing As Tolerated (for transfers only)  Restrictions/Precautions: Fall Risk, Weight Bearing     MOBILITY: I Mod I S SBA CGA Min Mod Max Total  NT x2 Comments:   Bed Mobility    Rolling [] [] [] [] [] [] [] [] [] [x] []    Supine to Sit [] [] [] [] [] [] [] [] [] [x] []    Scooting [] [] [] [x] [] [] [] [] [] [] []    Sit to Supine [] [] [] [] [] [] [] [x] [] [] []    Transfers    Sit to Stand [] [] [] [] [] [x] [] [] [] [] [] W/ RW   Bed to Chair [] [] [] [] [] [] [] [] [] [x] []    Stand to Sit [] [] [] [] [] [x] [] [] [] [] [] cues    [] [] [] [] [] [] [] [] [] [] []    I=Independent, Mod I=Modified Independent, S=Supervision, SBA=Standby Assistance, CGA=Contact Guard Assistance,   Min=Minimal Assistance, Mod=Moderate Assistance, Max=Maximal Assistance, Total=Total Assistance, NT=Not Tested    BALANCE: Good Fair+ Fair Fair- Poor NT Comments   Sitting Static [x] [] [] [] [] []    Sitting Dynamic [] [x] [] [] [] []      
Spouse  Type of Home: House  Home Layout: One level  Home Access: Stairs to enter without rails  Entrance Stairs - Number of Steps: 1  Bathroom Shower/Tub: Walk-in shower, Tub/Shower unit, Curtain, Shower chair with back  Bathroom Toilet: Standard  Bathroom Equipment: Hand-held shower, Commode, 3-in-1 Commode  Bathroom Accessibility: Accessible  Home Equipment: Cane, Walker - Standard  Receives Help From: Family  Prior Level of Assist for ADLs: Independent  Prior Level of Assist for Homemaking: Independent  Homemaking Responsibilities: Yes  Prior Level of Assist for Transfers: Needs assistance  Active : Yes  Mode of Transportation: Car  Occupation: Retired  OBJECTIVE:     PAIN: VITALS / O2: PRECAUTION / LINES / DRAINS:   Pre Treatment:   0/10 at rest      Post Treatment: 5/10 Vitals        Oxygen  RA External Catheter and IV    RESTRICTIONS/PRECAUTIONS:  Restrictions/Precautions  Restrictions/Precautions: Fall Risk, Weight Bearing  Lower Extremity Weight Bearing Restrictions  Right Lower Extremity Weight Bearing: Weight Bearing As Tolerated (for transfers only)  Restrictions/Precautions: Fall Risk, Weight Bearing     MOBILITY: I Mod I S SBA CGA Min Mod Max Total  NT x2 Comments:   Bed Mobility    Rolling [] [] [] [] [] [] [] [] [] [x] []    Supine to Sit [] [] [] [] [] [] [] [] [] [x] []    Scooting [] [] [] [x] [] [] [] [] [] [] []    Sit to Supine [] [] [] [] [] [] [] [x] [] [] []    Transfers    Sit to Stand [] [] [] [] [] [] [x] [] [] [] [] W/ RW   Bed to Chair [] [] [] [] [] [] [x] [] [] [] []    Stand to Sit [] [] [] [] [] [x] [] [] [] [] []     [] [] [] [] [] [] [] [] [] [] []    I=Independent, Mod I=Modified Independent, S=Supervision, SBA=Standby Assistance, CGA=Contact Guard Assistance,   Min=Minimal Assistance, Mod=Moderate Assistance, Max=Maximal Assistance, Total=Total Assistance, NT=Not Tested    BALANCE: Good Fair+ Fair Fair- Poor NT Comments   Sitting Static [x] [] [] [] [] []    Sitting 
Fair+ Fair Fair- Poor NT Comments   Sitting Static [x] [] [] [] [] []    Sitting Dynamic [] [x] [] [] [] []              Standing Static [] [] [x] [] [] []    Standing Dynamic [] [] [x] [] [] []      GAIT: I Mod I S SBA CGA Min Mod Max Total  NT x2 Comments:   Level of Assistance [] [] [] [] [] [] [x] [] [] [] []    Distance 3-4 steps    DME Rolling Walker    Gait Quality Antalgic, Decreased anitha , Decreased step clearance, Decreased step length, Decreased stance, and Trunk flexion    Weightbearing Status Right Lower Extremity Weight Bearing: Weight Bearing As Tolerated (for transfers only)    Stairs NT     I=Independent, Mod I=Modified Independent, S=Supervision, SBA=Standby Assistance, CGA=Contact Guard Assistance,   Min=Minimal Assistance, Mod=Moderate Assistance, Max=Maximal Assistance, Total=Total Assistance, NT=Not Tested    PLAN:   FREQUENCY AND DURATION: BID for duration of hospital stay or until stated goals are met, whichever comes first.    TREATMENT:   TREATMENT:   Therapeutic Activity (28 Minutes): Therapeutic activity included Rolling, Supine to Sit, Scooting, Transfer Training, Ambulation on level ground, Sitting balance , and Standing balance to improve functional Activity tolerance, Balance, Mobility, and Strength.    TREATMENT GRID:  N/A    AFTER TREATMENT PRECAUTIONS: Bed/Chair Locked, Call light within reach, Chair, Needs within reach, RN notified, and Visitors at bedside    INTERDISCIPLINARY COLLABORATION:  RN/ PCT, PT/ PTA, and OT/ ARAUZ    EDUCATION:      TIME IN/OUT:  Time In: 0945  Time Out: 1013  Minutes: 28    COLTON BLACKMAN, PT    
functional transfer, and assistive device in unsupported sitting and standing with minimal verbal and tactile cueing to increase independence, decrease assistance required, increase activity tolerance, and increase safety awareness. The patient was educated on role of occupational therapy, proper use of assistive device, recommended equipment, and transfer training and safety and patient and family verbalized understanding.     TREATMENT GRID:  N/A    AFTER TREATMENT PRECAUTIONS: Call light within reach, Chair, Needs within reach, RN notified, and Visitors at bedside    INTERDISCIPLINARY COLLABORATION:  RN/ PCT and PT/ PTA    EDUCATION:       TOTAL TREATMENT DURATION AND TIME:  Time In: 0945  Time Out: 1012  Minutes: 27    CONNOR Pugh, OT              
bedside    INTERDISCIPLINARY COLLABORATION:  RN/ PCT    EDUCATION:  Education Given To: Patient;Family  Education Provided: Role of Therapy;Plan of Care    TOTAL TREATMENT DURATION AND TIME:  Time In: 1103  Time Out: 1130  Minutes: 27    Anette Olivo, OT               
Monday Tuesday Wednesday Thursday Saturday    [START ON 2/14/2025] warfarin (COUMADIN) tablet 5 mg  5 mg Oral Every Friday    traZODone (DESYREL) tablet 50 mg  50 mg Oral Nightly PRN       Signed:  Valentina East DO    Part of this note may have been written by using a voice dictation software.  The note has been proof read but may still contain some grammatical/other typographical errors.

## 2025-02-10 NOTE — PROGRESS NOTES
4 Eyes Skin Assessment     NAME:  Nancy Omalley  YOB: 1940  MEDICAL RECORD NUMBER:  [id number]    The patient is being assessed for  Admission    I agree that at least one RN has performed a thorough Head to Toe Skin Assessment on the patient. ALL assessment sites listed below have been assessed.      Areas assessed by both nurses:    Head, Face, Ears, Shoulders, Back, Chest, Arms, Elbows, Hands, Sacrum. Buttock, Coccyx, Ischium, Legs. Feet and Heels, and Under Medical Devices         Does the Patient have a Wound? Yes wound(s) were present on assessment. LDA wound assessment was Initiated and completed by RN       Gui Prevention initiated by RN: Yes  Wound Care Orders initiated by RN: Yes    Pressure Injury (Stage 3,4, Unstageable, DTI, NWPT, and Complex wounds) if present, place Wound referral order by RN under : Yes    New Ostomies, if present place, Ostomy referral order under : No     Nurse 1 eSignature: Electronically signed by Bryn Naranjo RN on 2/10/25 at 6:40 PM EST    **SHARE this note so that the co-signing nurse can place an eSignature**    Nurse 2 eSignature: Electronically signed by Eva Pratt RN on 2/10/25 at 6:47 PM EST

## 2025-02-10 NOTE — PLAN OF CARE
Problem: Chronic Conditions and Co-morbidities  Goal: Patient's chronic conditions and co-morbidity symptoms are monitored and maintained or improved  2/10/2025 0346 by Danica Ashley RN  Outcome: Progressing  Flowsheets (Taken 2/9/2025 2100)  Care Plan - Patient's Chronic Conditions and Co-Morbidity Symptoms are Monitored and Maintained or Improved: Monitor and assess patient's chronic conditions and comorbid symptoms for stability, deterioration, or improvement  2/9/2025 1449 by Lisa Tang RN  Outcome: Progressing  Flowsheets (Taken 2/9/2025 0745)  Care Plan - Patient's Chronic Conditions and Co-Morbidity Symptoms are Monitored and Maintained or Improved: Monitor and assess patient's chronic conditions and comorbid symptoms for stability, deterioration, or improvement     Problem: Discharge Planning  Goal: Discharge to home or other facility with appropriate resources  2/10/2025 0346 by Danica Ashley RN  Outcome: Progressing  Flowsheets (Taken 2/9/2025 2100)  Discharge to home or other facility with appropriate resources: Identify barriers to discharge with patient and caregiver  2/9/2025 1449 by Lisa Tang RN  Outcome: Progressing  Flowsheets (Taken 2/9/2025 0745)  Discharge to home or other facility with appropriate resources: Identify barriers to discharge with patient and caregiver     Problem: Pain  Goal: Verbalizes/displays adequate comfort level or baseline comfort level  2/10/2025 0346 by Danica Ashley RN  Outcome: Progressing  2/9/2025 1449 by Lisa Tang RN  Outcome: Progressing  Flowsheets (Taken 2/9/2025 0745)  Verbalizes/displays adequate comfort level or baseline comfort level: Encourage patient to monitor pain and request assistance     Problem: Skin/Tissue Integrity  Goal: Skin integrity remains intact  Description: 1.  Monitor for areas of redness and/or skin breakdown  2.  Assess vascular access sites hourly  3.  Every 4-6 hours minimum:  Change oxygen

## 2025-02-10 NOTE — CARE COORDINATION
CM reviewed clinical notes. S/P (R) femoral ORIF.  Therapy recommending [facility]. Cardiology consulted and following.   Discharge order placed today to admit to [facility].  No additional CM needs identified.

## 2025-02-10 NOTE — PROGRESS NOTES
Patient fell and had surgery this past weekend. Patient is still in the hospital and they restarted the Coumadin yesterday, 02/09/2025 on 5mg daily. Patient will call when she is discharged from the hospital

## 2025-02-10 NOTE — DISCHARGE SUMMARY
Hospitalist Discharge Summary   Admit Date:  2025  9:30 PM   DC Note date: 2/10/2025  Name:  Nancy Omalley   Age:  85 y.o.  Sex:  female  :  1940   MRN:  [id number]   Room:  [place] [address]  PCP:  Heron Liang MD    Presenting Complaint: Fall     Initial Admission Diagnosis: Closed 2-part intertrochanteric fracture of proximal end of femur with delayed healing, right [S72.141G]     Problem List for this Hospitalization (present on admission):    Principal Problem:    Closed 2-part intertrochanteric fracture of proximal end of femur with delayed healing, right  Active Problems:    Chronic diastolic (congestive) heart failure (HCC)    Closed fracture of right hip (HCC)    Long term (current) use of anticoagulants    Hypertension    Coronary atherosclerosis    Dyslipidemia    Paroxysmal atrial fibrillation (HCC)    Mild traumatic brain injury    Gait abnormality    Decreased activities of daily living (ADL)  Resolved Problems:    Encounter for other specified aftercare      Hospital Course:  Mrs. Nancy Omalley is a 84 y.o. female with medical history of hypertension, paroxysmal atrial fibrillation on anticoagulation with Coumadin, and coronary disease who presented to emergency room after mechanical fall that occurred prior to arrival. She had no loss of consciousness. She was at a local high school performance and was leaving the auditorium when she tripped and fell, hitting her head on the side of a metal door and landed on right hip. She developed a deformity of right lower extremity and was not able to weight-bear. She was brought to emergency room and x-ray of the right hip showed evidence of intertrochanteric fracture. Orthopedics was contacted who recommended Hospitalist admission. She does have a history of coronary disease and PAF and follows with our cardiology team. They were consulted for surgical clearance. CT head was also obtained and did not show any intracranial hemorrhage but

## 2025-02-10 NOTE — CONSULTS
See note from earlier today.  CAIO Gibson   
SURGICAL HISTORY      nephrectomy  3rd kidney    PACEMAKER      [place] UNLISTED PROCEDURE ABDOMEN PERITONEUM & OMENTUM      gallbladder    [place] UNLISTED PROCEDURE CARDIAC SURGERY      card cath stents,at fib ablation    VITRECTOMY Left 2024    EYE VITRECTOMY 25ga LASER, SF6 GAS FLUID EXCHANGE performed by Florian Joyner MD at [facility]       Allergies   Allergen Reactions    Sulfa Antibiotics Swelling     Other reaction(s): Hives/Swelling-Allergy  Eye swelling    Atorvastatin Other (See Comments)     Other reaction(s): Myalgia-Intolerance    Codeine Nausea Only     Other reaction(s): Nausea and/or vomiting-Intolerance    Hydromorphone Other (See Comments)     Other reaction(s): Nausea and/or vomiting-Intolerance    Morphine Other (See Comments)     Other reaction(s): Nausea and/or vomiting-Intolerance    Simvastatin Other (See Comments)     Other reaction(s): Myalgia-Intolerance      Social History     Socioeconomic History    Marital status:      Spouse name: Not on file    Number of children: Not on file    Years of education: Not on file    Highest education level: Not on file   Occupational History    Not on file   Tobacco Use    Smoking status: Former     Current packs/day: 0.00     Types: Cigarettes     Quit date: 10/29/1985     Years since quittin.3     Passive exposure: Past    Smokeless tobacco: Never   Substance and Sexual Activity    Alcohol use: No    Drug use: Not on file    Sexual activity: Not on file   Other Topics Concern    Not on file   Social History Narrative    Not on file     Social Determinants of Health     Financial Resource Strain: Low Risk  (2023)    [facility]    Financial Resource Strain     Difficulty Paying Living Expenses: Not hard at all     Difficulty Paying Medical Expenses: No   Food Insecurity: No Food Insecurity (2023)    Received from [facility]    Food Insecurity     Worried about Running Out of Food in 
   Arthritis     Atrial fibrillation (HCC) 2014    CAD (coronary artery disease)     Cancer (HCC)     basal    Congestive heart failure (CHF) (HCC) 10/29/2015    Coronary atherosclerosis 10/29/2015    GERD (gastroesophageal reflux disease)     Hypertension     Hypokalemia 2014    Nausea & vomiting     Pacemaker 2014    Thyroid disease     Unstable angina (HCC) 2014      Past Surgical History:   Procedure Laterality Date    BREAST BIOPSY Left     Lt breast approx. 25 yrs ago.    FEMUR SURGERY Right 2025    FEMUR OPEN REDUCTION INTERNAL FIXATION, Right FNS performed by Henrique Byers MD at [facility] MA[facility] OR    GYN      total hysterectomy    OTHER SURGICAL HISTORY      nephrectomy  3rd kidney    PACEMAKER      [place] UNLISTED PROCEDURE ABDOMEN PERITONEUM & OMENTUM      gallbladder    [place] UNLISTED PROCEDURE CARDIAC SURGERY      card cath stents,at fib ablation    VITRECTOMY Left 2024    EYE VITRECTOMY 25ga LASER, [facility] GAS FLUID EXCHANGE performed by Florian Joyner MD at [facility]     Allergies   Allergen Reactions    Sulfa Antibiotics Swelling     Other reaction(s): Hives/Swelling-Allergy  Eye swelling    Atorvastatin Other (See Comments)     Other reaction(s): Myalgia-Intolerance    Codeine Nausea Only     Other reaction(s): Nausea and/or vomiting-Intolerance    Hydromorphone Other (See Comments)     Other reaction(s): Nausea and/or vomiting-Intolerance    Morphine Other (See Comments)     Other reaction(s): Nausea and/or vomiting-Intolerance    Simvastatin Other (See Comments)     Other reaction(s): Myalgia-Intolerance      Family History   Problem Relation Age of Onset    Breast Cancer Neg Hx     Heart Disease Mother     Heart Disease Brother     Heart Disease Brother       Social History     Tobacco Use    Smoking status: Former     Current packs/day: 0.00     Types: Cigarettes     Quit date: 10/29/1985     Years since quittin.3     Passive exposure: Past    Smokeless tobacco: Never 
right lower extremity.  Motor and sensory function intact.  Stability- is difficult to assess stability because of the presence of the fracture  Tenderness to palpation over the right greater trochanter area  Skin- no rashes ulcerations or open wounds right lower extremity  Gait-pt cannot put any weight on the right lower extremity      Assessment:     Hospital Problems             Last Modified POA    * (Principal) Closed 2-part intertrochanteric fracture of proximal end of femur with delayed healing, right 2/8/2025 Yes    Long term (current) use of anticoagulants 2/8/2025 Yes    Hypertension 2/8/2025 Yes    Congestive heart failure (CHF) (HCC) 2/8/2025 Yes    Coronary atherosclerosis 2/8/2025 Yes    Overview Signed 3/19/2022  5:11 PM by Mahesh Boles MD     remote LAD stenting   NL LV   last cath patent 2014           Dyslipidemia 2/8/2025 Yes    Paroxysmal atrial fibrillation (HCC) 2/8/2025 Yes      The above diagnosis is wrong she does not have an intertrochanteric proximal femur fracture she has what appears to be a right femoral neck fracture.    Xrays and or studies:    X-rays show a right femoral neck fracture  Plan:     Sub spoke with the patient regarding treatment options.  I think the most reasonable thing would be to treat this with plate and screw fixation.  The plan will be to proceed with open treatment of right femoral neck fracture with plate and screw fixation today in the operating room    Signed By: Henrique Byers MD

## 2025-02-10 NOTE — PLAN OF CARE
Problem: Chronic Conditions and Co-morbidities  Goal: Patient's chronic conditions and co-morbidity symptoms are monitored and maintained or improved  Outcome: Progressing  Flowsheets (Taken 2/10/2025 1630)  Care Plan - Patient's Chronic Conditions and Co-Morbidity Symptoms are Monitored and Maintained or Improved: Monitor and assess patient's chronic conditions and comorbid symptoms for stability, deterioration, or improvement     Problem: Skin/Tissue Integrity  Goal: Skin integrity remains intact  Description: 1.  Monitor for areas of redness and/or skin breakdown  2.  Assess vascular access sites hourly  3.  Every 4-6 hours minimum:  Change oxygen saturation probe site  4.  Every 4-6 hours:  If on nasal continuous positive airway pressure, respiratory therapy assess nares and determine need for appliance change or resting period  Outcome: Progressing  Flowsheets (Taken 2/10/2025 1630)  Skin Integrity Remains Intact: Monitor for areas of redness and/or skin breakdown     Problem: Safety - Adult  Goal: Free from fall injury  Outcome: Progressing

## 2025-02-11 LAB
ANION GAP SERPL CALC-SCNC: 9 MMOL/L (ref 7–16)
BASOPHILS # BLD: 0.06 K/UL (ref 0–0.2)
BASOPHILS NFR BLD: 0.7 % (ref 0–2)
BUN SERPL-MCNC: 24 MG/DL (ref 8–23)
CALCIUM SERPL-MCNC: 9.5 MG/DL (ref 8.8–10.2)
CHLORIDE SERPL-SCNC: 105 MMOL/L (ref 98–107)
CO2 SERPL-SCNC: 28 MMOL/L (ref 20–29)
CREAT SERPL-MCNC: 0.86 MG/DL (ref 0.6–1.1)
DIFFERENTIAL METHOD BLD: ABNORMAL
EOSINOPHIL # BLD: 0.25 K/UL (ref 0–0.8)
EOSINOPHIL NFR BLD: 2.8 % (ref 0.5–7.8)
ERYTHROCYTE [DISTWIDTH] IN BLOOD BY AUTOMATED COUNT: 16.6 % (ref 11.9–14.6)
GLUCOSE SERPL-MCNC: 136 MG/DL (ref 70–99)
HCT VFR BLD AUTO: 38.4 % (ref 35.8–46.3)
HGB BLD-MCNC: 12.4 G/DL (ref 11.7–15.4)
IMM GRANULOCYTES # BLD AUTO: 0.09 K/UL (ref 0–0.5)
IMM GRANULOCYTES NFR BLD AUTO: 1 % (ref 0–5)
INR PPP: 1.4
LYMPHOCYTES # BLD: 2.09 K/UL (ref 0.5–4.6)
LYMPHOCYTES NFR BLD: 23.8 % (ref 13–44)
MAGNESIUM SERPL-MCNC: 2 MG/DL (ref 1.8–2.4)
MCH RBC QN AUTO: 28.5 PG (ref 26.1–32.9)
MCHC RBC AUTO-ENTMCNC: 32.3 G/DL (ref 31.4–35)
MCV RBC AUTO: 88.3 FL (ref 82–102)
MONOCYTES # BLD: 0.98 K/UL (ref 0.1–1.3)
MONOCYTES NFR BLD: 11.1 % (ref 4–12)
NEUTS SEG # BLD: 5.32 K/UL (ref 1.7–8.2)
NEUTS SEG NFR BLD: 60.6 % (ref 43–78)
NRBC # BLD: 0 K/UL (ref 0–0.2)
PLATELET # BLD AUTO: 177 K/UL (ref 150–450)
PMV BLD AUTO: 10.4 FL (ref 9.4–12.3)
POTASSIUM SERPL-SCNC: 3.7 MMOL/L (ref 3.5–5.1)
PROTHROMBIN TIME: 18.5 SEC (ref 11.3–14.9)
RBC # BLD AUTO: 4.35 M/UL (ref 4.05–5.2)
SODIUM SERPL-SCNC: 142 MMOL/L (ref 136–145)
WBC # BLD AUTO: 8.8 K/UL (ref 4.3–11.1)

## 2025-02-11 PROCEDURE — 36415 COLL VENOUS BLD VENIPUNCTURE: CPT

## 2025-02-11 PROCEDURE — 97535 SELF CARE MNGMENT TRAINING: CPT

## 2025-02-11 PROCEDURE — 2500000003 HC RX 250 WO HCPCS: Performed by: STUDENT IN AN ORGANIZED HEALTH CARE EDUCATION/TRAINING PROGRAM

## 2025-02-11 PROCEDURE — 1180000000 HC REHAB R&B

## 2025-02-11 PROCEDURE — 97166 OT EVAL MOD COMPLEX 45 MIN: CPT

## 2025-02-11 PROCEDURE — 80048 BASIC METABOLIC PNL TOTAL CA: CPT

## 2025-02-11 PROCEDURE — 6370000000 HC RX 637 (ALT 250 FOR IP): Performed by: STUDENT IN AN ORGANIZED HEALTH CARE EDUCATION/TRAINING PROGRAM

## 2025-02-11 PROCEDURE — 85025 COMPLETE CBC W/AUTO DIFF WBC: CPT

## 2025-02-11 PROCEDURE — 83735 ASSAY OF MAGNESIUM: CPT

## 2025-02-11 PROCEDURE — 97530 THERAPEUTIC ACTIVITIES: CPT

## 2025-02-11 PROCEDURE — 85610 PROTHROMBIN TIME: CPT

## 2025-02-11 PROCEDURE — 97110 THERAPEUTIC EXERCISES: CPT

## 2025-02-11 PROCEDURE — 99222 1ST HOSP IP/OBS MODERATE 55: CPT | Performed by: STUDENT IN AN ORGANIZED HEALTH CARE EDUCATION/TRAINING PROGRAM

## 2025-02-11 PROCEDURE — 97162 PT EVAL MOD COMPLEX 30 MIN: CPT

## 2025-02-11 RX ORDER — PANTOPRAZOLE SODIUM 40 MG/1
40 TABLET, DELAYED RELEASE ORAL
Status: DISCONTINUED | OUTPATIENT
Start: 2025-02-12 | End: 2025-02-21 | Stop reason: HOSPADM

## 2025-02-11 RX ORDER — ACETAMINOPHEN 500 MG
1000 TABLET ORAL 3 TIMES DAILY
Status: DISCONTINUED | OUTPATIENT
Start: 2025-02-11 | End: 2025-02-21 | Stop reason: HOSPADM

## 2025-02-11 RX ADMIN — OXYCODONE 5 MG: 5 TABLET ORAL at 22:29

## 2025-02-11 RX ADMIN — ACETAMINOPHEN 1000 MG: 500 TABLET, FILM COATED ORAL at 14:43

## 2025-02-11 RX ADMIN — OXYCODONE 5 MG: 5 TABLET ORAL at 14:42

## 2025-02-11 RX ADMIN — SOTALOL HYDROCHLORIDE 120 MG: 80 TABLET ORAL at 07:52

## 2025-02-11 RX ADMIN — WARFARIN SODIUM 2.5 MG: 5 TABLET ORAL at 17:18

## 2025-02-11 RX ADMIN — SOTALOL HYDROCHLORIDE 120 MG: 80 TABLET ORAL at 21:25

## 2025-02-11 RX ADMIN — OXYCODONE 5 MG: 5 TABLET ORAL at 05:20

## 2025-02-11 RX ADMIN — TRAZODONE HYDROCHLORIDE 50 MG: 50 TABLET ORAL at 22:34

## 2025-02-11 RX ADMIN — ACETAMINOPHEN 650 MG: 325 TABLET ORAL at 05:22

## 2025-02-11 RX ADMIN — LEVOTHYROXINE SODIUM 137 MCG: 0.05 TABLET ORAL at 12:14

## 2025-02-11 RX ADMIN — ACETAMINOPHEN 1000 MG: 500 TABLET, FILM COATED ORAL at 21:26

## 2025-02-11 RX ADMIN — DOCUSATE SODIUM 50 MG AND SENNOSIDES 8.6 MG 1 TABLET: 8.6; 5 TABLET, FILM COATED ORAL at 21:27

## 2025-02-11 RX ADMIN — ONDANSETRON 4 MG: 4 TABLET, ORALLY DISINTEGRATING ORAL at 09:25

## 2025-02-11 RX ADMIN — SODIUM CHLORIDE, PRESERVATIVE FREE 10 ML: 5 INJECTION INTRAVENOUS at 07:56

## 2025-02-11 RX ADMIN — OXYCODONE 5 MG: 5 TABLET ORAL at 18:23

## 2025-02-11 ASSESSMENT — PAIN DESCRIPTION - DESCRIPTORS
DESCRIPTORS: ACHING

## 2025-02-11 ASSESSMENT — PAIN SCALES - GENERAL
PAINLEVEL_OUTOF10: 7
PAINLEVEL_OUTOF10: 3
PAINLEVEL_OUTOF10: 8
PAINLEVEL_OUTOF10: 8
PAINLEVEL_OUTOF10: 0
PAINLEVEL_OUTOF10: 8
PAINLEVEL_OUTOF10: 6

## 2025-02-11 ASSESSMENT — PAIN DESCRIPTION - ORIENTATION
ORIENTATION: RIGHT
ORIENTATION: RIGHT
ORIENTATION: ANTERIOR
ORIENTATION: RIGHT

## 2025-02-11 ASSESSMENT — PAIN DESCRIPTION - LOCATION
LOCATION: HIP;INCISION
LOCATION: HIP;LEG
LOCATION: HIP;LEG
LOCATION: HIP;KNEE;LEG

## 2025-02-11 ASSESSMENT — PAIN SCALES - WONG BAKER: WONGBAKER_NUMERICALRESPONSE: HURTS A LITTLE BIT

## 2025-02-11 NOTE — PROGRESS NOTES
[facility] OCCUPATIONAL THERAPY DAILY NOTE  OT Individual Minutes  Time In: 1307  Time Out: 1348  Minutes: 41               Subjective: Pt agreeable to treatment; declined treatment in gym, stating she wants to take it easy today due to not feeling well after throwing up earlier.  Pain: No pain expressed.  Interdisciplinary Communication: Collaborated with PT regarding handoff communication.   Precautions: Falls, Poor Safety Awareness, [place] Alarm,   Orthostatic hypotension, and WBAT RLE for transfers only  Lines:N/A  O2 Device: RA       - Activity Tolerance - Coordination - Range of Motion - Visual-Perceptual    Pt engaged with lacing task to address fine motor coordination, visual perceptual/coordination, bilateral coordination, and sequencing for integration into functional tasks.   Pt with improved accuracy and speed as task progressed.   Pt requires demo for initiation, and Cues for visual attention to task as compensatory strategy and extra time d/t RUE ROM deficit. Pt demo ability to self correct error.       - Self-Care   ACTIVITIES OF DAILY LIVING:  Donning/[place] Footwear Partial/moderate assistance ModA using AE sock aid and reacher for donning/doffing socks; MaxA for shoes         Education   Adaptive ADL Techniques, AE/DME Training, Benefits of OT, and Energy Conservation, Pacing     Assessment: Patient demo good ability to don/doff sock using AE sock aid and reacher, with ModA. Pt demonstrated good participation in OT treatment. Pt continues to benefit from skilled OT services to address remaining deficits and progress toward baseline level of independence and safety. Patient ended session seated in recliner with call bell and needs within reach and with PT.   Plan: Continue OT POC.     CARLO MARTINEZ/HARRIETT OTD  2/11/2025

## 2025-02-11 NOTE — PROGRESS NOTES
PHYSICAL THERAPY DAILY NOTE    PT Individual Minutes  Time In: 1346  Time Out: 1432  Minutes: 46                 Total Treatment Time:  46 Minutes    Pt. Seen for: PM, Therapeutic Exercise, and Transfer Training     Subjective: Pt. Reports she has not had any further N&V since this morning.           Objective:  Restrictions/Precautions: Fall Risk, Weight Bearing   Right Lower Extremity Weight Bearing: Weight Bearing As Tolerated (for transfers only;  static standing allowed)                    GROSS ASSESSMENT   Pt. Up in recliner upon arrival.        COGNITION Daily Assessment        Overall Cognitive Status: WFL        BED/MAT MOBILITY Daily Assessment    NT       TRANSFERS Daily Assessment   Pt. Requires cues for sequencing throughout transfer Sit to Stand: Minimal Assistance;Partial/Moderate assistance  Stand to Sit: Minimal Assistance  Bed to Chair: Minimal assistance (w/ RW)          GAIT Daily Assessment     N/A       STEPS/STAIRS Daily Assessment     N/A         BALANCE Daily Assessment    Static Sitting: Good:  Pt. able to maintain balance w/o UE support;  exhibits some postural sway  Dynamic Sitting: Good - accepts moderate challenge;  can maintain balance while picking object off the floor  Static Standing: Fair:  Pt. requires UE support;  may need occasional min A  Dynamic Standing: Fair - accepts minimal challenge;  can maintain balance while turning head/trunk       WHEELCHAIR MOBILITY Daily Assessment     NT              LOWER EXTREMITY EXERCISES   Pt. Performed Motomed @ resistance level 2 x10 minutes to increase ROM R hip     Pain level: not rated  Pain Location:  R hip  Pain Interventions: CNA to apply ice to hip after treatment    Vital Signs:  /60, HR 75    Education:    Education Given To: Patient  Education Provided: Role of Therapy;Transfer Training;Plan of Care;Mobility Training;Precautions  Education Provided Comments: Pt. educated on purpose of Motomed  Education Method:

## 2025-02-11 NOTE — H&P
Correction: HFrEF      [facility]  Inpatient Rehab Program    Admission History and Physical Exam  INPATIENT REHABILITATION CENTER      IRC Admission Date: 2/10/2025  Primary Care Provider: Heron Liang MD  Specialty Group / Referring Service: Medicine      Chief Complaint : Right hip pain  Admitting Diagnosis:   Mild traumatic brain injury, without loss of consciousness, sequela [S06.9X0S]    Principal Problem:    Mild traumatic brain injury, without loss of consciousness, sequela  Resolved Problems:    * No resolved hospital problems. *      Acute Rehab Diagnoses:  Encounter for rehabilitation [Z51.89]   Abnormality of gait and mobility [R26.9]  Decreased independence for activities of daily living (ADL) [Z78.9]  Physical debility / deconditioning [R53.81]      Medical Dx:  Past Medical History:   Diagnosis Date    Accelerated hypertension 8/20/2014    Aortic valve insufficiency 8/20/2014    Arrhythmia     at fib    Arthritis     Atrial fibrillation (HCC) 8/20/2014    CAD (coronary artery disease)     Cancer (HCC)     basal    Congestive heart failure (CHF) ([facility]) 10/29/2015    Coronary atherosclerosis 10/29/2015    GERD (gastroesophageal reflux disease)     Hypertension     Hypokalemia 8/20/2014    Nausea & vomiting     Pacemaker 8/20/2014    Thyroid disease     Unstable angina (HCC) 8/20/2014       Date of Evaluation: February 11, 2025      HPI:  Nancy Omalley is a 85 y.o. female patient who presented to [facility] on 2/7/2025 secondary to ground-level fall. Per H&P \" Nancy Omalley is a 84 y.o. female with medical history of hypertension, paroxysmal atrial fibrillation on anticoagulation with Coumadin, coronary disease presented to emergency room after mechanical fall that occurred prior to arrival. No loss of consciousness. She was at a local high school performance and was living auditorium when she tripped and fell hitting her head side of a metal door, landed on  complications.              Current Risks:   **Risk for Delirium given prolonged hospital stay.   **Risks for falls given recent immobility, weakness and fatigue      Fall precautions in place. Chair and bed alarms are set daily and nightly to discourage patient from getting up unattended. Discussed strategy to reduce the risk of falls which include understanding patient’s known fall risk factors and management of the risk factors identified, such as modification of home environment, avoiding or decreasing psychoactive medications. Sitting for 3-5 minutes prior to standing for blood pressure acclimation and avoiding orthostasis. Encouraged continuing exercise therapies which were/will be taught and implemented at [facility], particularly balance, strength, and gait training.  Avoid if possible the following medications which can contribute to delirium: benzodiazepines, antihistamines, and anticholinergics  Avoid sleep disruption by rescheduling early morning blood draws, avoiding middle of the night vital signs or disturbances,  minimizing noise.   Keep  lights on and blinds open during the day; lights and TV off at night; minimize tethers including urinary catheters; use glasses, hearing aids, and dentures as appropriate.  Avoid long periods of sleep during the day. Brief naps can be helpful but long periods of sleep can disrupt the sleep wake cycle for this patient.  Reorientation and visual cues for orientation should be tried  Maintain hydration and bowel function      Disposition: To be determined    Patient exhibits the ability to tolerate active participation in at least three hours of therapy services per day, five days a week to include Physical Therapy and Occupational Therapy in addition to SLP if indicated. Furthermore, underlying medical problems present potential risk for decompensation and therefore requires continued close physician monitoring.  Other medical complications include but are not limited to:

## 2025-02-11 NOTE — ACP (ADVANCE CARE PLANNING)
Advance Care Planning     Advance Care Planning Activator (Inpatient)  Conversation Note      Health Care Decision Maker:  No LW/HCPOA on file, patient aware legal next of kin remain decision maker until document provided.      Current Designated Health Care Decision Maker:     Primary Decision Maker: Manjinder Omalley - Spouse - [phone]    Secondary Decision Maker: Iron Wolff - Child - [phone]      Care Preferences Full Code per MD order

## 2025-02-11 NOTE — PLAN OF CARE
Problem: Chronic Conditions and Co-morbidities  Goal: Patient's chronic conditions and co-morbidity symptoms are monitored and maintained or improved  2/11/2025 0938 by Bryn Naranjo RN  Outcome: Progressing  Flowsheets (Taken 2/11/2025 0730)  Care Plan - Patient's Chronic Conditions and Co-Morbidity Symptoms are Monitored and Maintained or Improved: Monitor and assess patient's chronic conditions and comorbid symptoms for stability, deterioration, or improvement  2/11/2025 0545 by Rima Suarez, RN  Outcome: Progressing     Problem: Skin/Tissue Integrity  Goal: Skin integrity remains intact  Description: 1.  Monitor for areas of redness and/or skin breakdown  2.  Assess vascular access sites hourly  3.  Every 4-6 hours minimum:  Change oxygen saturation probe site  4.  Every 4-6 hours:  If on nasal continuous positive airway pressure, respiratory therapy assess nares and determine need for appliance change or resting period  2/11/2025 0938 by Bryn Naranjo, RN  Outcome: Progressing  2/11/2025 0545 by Rima Suarez RN  Outcome: Progressing     Problem: Safety - Adult  Goal: Free from fall injury  2/11/2025 0938 by Bryn Naranjo RN  Outcome: Progressing  2/11/2025 0545 by Rima Suarez RN  Outcome: Progressing     Problem: ABCDS Injury Assessment  Goal: Absence of physical injury  Outcome: Progressing

## 2025-02-11 NOTE — PROGRESS NOTES
[facility] OCCUPATIONAL THERAPY INITIAL EVALUATION  OT Individual Minutes  Time In: 0830  Time Out: 0916  Minutes: 46         Nancy Omalley is a 85 y.o. female admitted with the following:          Closed 2-part intertrochanteric fracture of proximal end of femur with delayed healing, right  S/P FEMUR OPEN REDUCTION INTERNAL FIXATION, Right FNS (Right);    WBAT R LE for transfers only; okay for static stand activities    Past Medical History:   Diagnosis Date    Accelerated hypertension 8/20/2014    Aortic valve insufficiency 8/20/2014    Arrhythmia     at fib    Arthritis     Atrial fibrillation ([facility]) 8/20/2014    CAD (coronary artery disease)     Cancer ([facility])     basal    Congestive heart failure (CHF) ([facility]) 10/29/2015    Coronary atherosclerosis 10/29/2015    GERD (gastroesophageal reflux disease)     Hypertension     Hypokalemia 8/20/2014    Nausea & vomiting     Pacemaker 8/20/2014    Thyroid disease     Unstable angina ([facility]) 8/20/2014       Patient's Goal: Return home independently.  Pain: Patient reports 5/10 pain and RN notified , provided meds prior.  Precautions: Falls, Impulsive, Poor Safety Awareness, [place] Alarm, and WBAT R LE for transfers only; okay for static stand activities  Prior Level of Function: Indep with ADLs, IADLs, and functional mobility without DME/AD.  Lines:N/A  O2 Device: RA    LIVING SITUATION:    Environment   Living Alone No   Support System Spouse/Significant Other    Home Set Up 1 Level Home   Home Entrance 1 Step(s) to Enter   Bathroom Setup  Walk-In Shower   Current DME None   Work Status N/A   Driving Status Limited active prior to admission     UPPER EXTREMITY ASSESSMENT:   RUE LUE   General Evaluation Generally Decreased, Functional WFL   Muscle Tone Normal Normal   FMC WFL WFL   GMC WFL WFL   Sensation Grossly intact Grossly intact   ROM Generally Decreased, Functional WFL   Proprioception Intact Intact   Nose to Finger N/A N/A     STRENGTH: RUE LUE   Shoulder Flexion 3-/5  More than half, but not full range of motion against gravity 4-/5 Completes full range of motion against gravity with minimal-moderate resistance   Shoulder Abduction 3-/5 More than half, but not full range of motion against gravity 4-/5 Completes full range of motion against gravity with minimal-moderate resistance   Elbow Flexion 4-/5 Completes full range of motion against gravity with minimal-moderate resistance 4-/5 Completes full range of motion against gravity with minimal-moderate resistance   Elbow Extension  4-/5 Completes full range of motion against gravity with minimal-moderate resistance 4-/5 Completes full range of motion against gravity with minimal-moderate resistance    4-/5 Completes full range of motion against gravity with minimal-moderate resistance 4-/5 Completes full range of motion against gravity with minimal-moderate resistance     BALANCE/POSTURE:   Static Dynamic   Sitting Good: Able to maintain balance without UE support; exhibits some postural sway Fair: Accepts minimal challenge; can maintain balance while turning head/trunk   Standing Poor: Requires UE support and Mod-MaxA to maintain position NT     FUNCTIONAL MOBILITY:       Bed Mobility Rebekah Supine to EOB w/ assist for RLE, using bed adjustments   Sit to Stand Rebekah and ModA Using RW, improved with bed elevated   Transfer  NT  Transfer Type: NA  Equipment: NA    Ambulation NT  Equipment: NA      ACTIVITIES OF DAILY LIVING:   Score Comments   Eating Setup or clean-up assistance BROWN   Oral Hygiene Supervision or touching assistance Item retrieval, cues to initiate   Shower/Bathe Partial/moderate assistance ModA, bed bath seated EOB   Upper Body  Dressing Setup or clean-up assistance BROWN. item retrieval   Lower Body Dressing Substantial/maximal assistance MaxA pants   Donning/[place] Footwear Substantial/maximal assistance MaxA Socks/Shoes   Toileting Hygiene Partial/moderate assistance ModA   Toilet Transfer Partial/moderate

## 2025-02-11 NOTE — CARE COORDINATION
Case Management Assessment  Initial Evaluation    Date/Time of Evaluation: 2/11/2025 11:27 AM  Assessment Completed by: JESSIE ECHAVARRIA    If patient is discharged prior to next notation, then this note serves as note for discharge by case management.    Patient Name: Nancy Omalley                   YOB: 1940  Diagnosis:   Mild traumatic brain injury, without loss of consciousness, sequela [S06.9X0S]                   Date / Time: 2/10/2025  4:47 PM    Patient Admission Status: REHAB IP   Readmission Risk (Low < 19, Mod (19-27), High > 27): Readmission Risk Score: 17.4        Current PCP: Heron Liang MD  PCP verified by CM? (P) Yes    Chart Reviewed: Yes      History Provided by: (P) Patient  Patient Orientation: (P) Alert and Oriented    Patient Cognition: (P) Alert    Hospitalization in the last 30 days (Readmission):  No    If yes, Readmission Assessment in  Navigator will be completed.    Advance Directives:      Code Status: Full Code   Patient's Primary Decision Maker is: (P) Legal Next of Kin    Primary Decision Maker: Manjinder Omalley - Spouse - [phone]    Secondary Decision Maker: Iron Wolff - Child - [phone]    Discharge Planning:    Patient lives with: (P) Spouse/Significant Other Type of Home: (P) House  Primary Care Giver: (P) Self  Patient Support Systems include: (P) Spouse/Significant Other, Children, Family Members   Current Financial resources: (P) Medicare, Other (Comment) (cigna)  Current community resources: (P) None  Current services prior to admission: (P) Durable Medical Equipment            Current DME: (P) Cane, Shower Chair, Walker, Bedside Commode            Type of Home Care services:  None    ADLS  Prior functional level: (P) Independent in ADLs/IADLs  Current functional level: (P) Assistance with the following:, Bathing, Dressing, Toileting, Cooking, Housework, Shopping, Mobility    PT AM-PAC:   /24  OT AM-PAC:   /24    Family can provide assistance at  by CM Yes   Last Visit to PCP Within last 6 months   Prior Functional Level Independent in ADLs/IADLs   Current Functional Level Assistance with the following:;Bathing;Dressing;Toileting;Cooking;Housework;Shopping;Mobility   Can patient return to prior living arrangement Unknown at present   Ability to make needs known: Good   Family able to assist with home care needs: Yes   Would you like for me to discuss the discharge plan with any other family members/significant others, and if so, who? Yes   Financial Resources Medicare;Other (Comment)  (cigna)   Community Resources None   CM/SW Referral Other (see comment)  (pending)   Social/Functional History   Lives With Spouse   Type of Home House   Home Layout One level   Home Access Stairs to enter without rails   Entrance Stairs - Number of Steps 1   Bathroom Shower/Tub Tub/Shower unit;Walk-in shower   Bathroom Toilet Standard   Bathroom Equipment Shower chair;Hand-held shower;Commode;3-in-1 Commode   Bathroom Accessibility Accessible   Home Equipment Cane;Walker - Standard   Receives Help From Family   Prior Level of Assist for ADLs Independent   Prior Level of Assist for Homemaking Independent   Homemaking Responsibilities Yes   Ambulation Assistance Independent   Prior Level of Assist for Transfers Independent   Active  Yes   Occupation Retired   Discharge Planning   Type of Residence House   Living Arrangements Spouse/Significant Other   Current Services Prior To Admission Durable Medical Equipment   Current DME Prior to Arrival Cane;Shower Chair;Walker;Bedside Commode   Potential Assistance Purchasing Medications No   Patient expects to be discharged to: House   One/Two Story Residence One story   History of falls? 1   Services At/After Discharge   Transition of Care Consult (CM Consult) Discharge Planning   Condition of Participation: Discharge Planning   Freedom of Choice list was provided with basic dialogue that supports the patient's individualized plan  of care/goals, treatment preferences, and shares the quality data associated with the providers?  Yes

## 2025-02-11 NOTE — PROGRESS NOTES
Warfarin dosing per pharmacist    Nancy Omalley is a 85 y.o. female.    Height: 165.1 cm (5' 5\")  Weight - Scale: 60.8 kg (134 lb)    Indication:  Afib    Goal INR:  2-3    Home dose:  5 mg on Fridays and 2.5 mg all remaining days    Risk factors or significant drug interactions:  post-op hip fracture due to fall    Other anticoagulants:  none    Lab Results   Component Value Date/Time    HGB 12.4 02/11/2025 04:58 AM    HGB 12.7 02/10/2025 05:06 AM    HGB 12.7 02/09/2025 07:03 AM     Lab Results   Component Value Date/Time    INR 1.4 02/11/2025 04:58 AM    INR 2.6 04/25/2022 11:35 AM        Daily Monitoring  Date  INR     Warfarin dose Notes  2/7  1.6  Held Vit K IV 10 mg  2/8  1.5  5 mg   2/9  1.2  5 mg   2/10  1.2  5 mg  2/11  1.4  2.5 mg      Pharmacy was consulted to assist with warfarin dosing while inpatient. The patients home regimen, indication, and INR goal were confirmed via reviewing an outpatient anticoagulation note. Of note the patients INR was subtherapeutic at their last appointment on 1.7 and again on admission at 1.6. Vitamin K 10 mg was administered overnight on 2/7 pre-operatively. Warfarin was resumed by ortho surgery on 2/8 POD0.    INR today is remains subtherapeutic but is starting to trend upward. Now 1.4.    Will resume usual home dose of warfarin 2.5 mg this evening.     Daily INR, pharmacy will continue to follow.    Thank you,  Kendell Heaton [facility]

## 2025-02-11 NOTE — PROGRESS NOTES
[facility] OCCUPATIONAL THERAPY DAILY NOTE  OT Individual Minutes  Time In: 1046  Time Out: 1125  Minutes: 39               Subjective: Pt agreeable to treatment. \"When I stood I I threw up, twice\"  Pain: No pain expressed.  Interdisciplinary Communication: Collaborated with PT, RN, and CM  regarding pt status and pt performance.   Precautions: Falls, [place] Alarm, and WBAT RLE for transfers ONLY  Lines:N/A  O2 Device: RA    FUNCTIONAL MOBILITY:       Bed Mobility NT    Sit to Stand NT    Transfer  NT  Transfer Type: NA  Equipment: NA    Ambulation NT  Equipment: NA       - Self-Care   Pt completed self-care seated at recliner to challenge BUE function, functional mobility and (I) with ADLs in preparation for safe return to max (I) with ADLs. Pt tolerated self-care well with no c/o pain. Rest breaks utilized as needed throughout..  Pt completed:  Grooming; S/U oral care seated in recliner with minimally increased time.        - Activity Tolerance - Coordination - Energy Conservation/Pacing  - Range of Motion - Strengthening   Pt completed therapeutic activities to challenge  BUE AROM/STR, B/L hand(s) dexterity and pinch, FM/GM coordination, bi-manual coordination, and activity tolerance in preparation for safe return to max (I) with I/ADLs. Pt tolerated Pt tolerated activities well with no c/o pain. . Rest breaks utilized as needed throughout..   Pt completed object retrieval activity seated at recliner. Pt used B/L hand(s) to spread, stretch and flatten putty. Pt located and retrieved small red beads with B/L hand(s) and placed into container at midline. Pt completed Orange, mixed soft-medium, theraputty with 16 small red beads. Once completed pt flattened out putty with B/L hands and rolling pin. Pt then retrieved beads with B/L hands and pressed into putty one at a time. Pt required increased time, 1-3 drops noted and 1-3 verbal cues for directions.        Education   Benefits of OT and Energy Conservation, Pacing

## 2025-02-11 NOTE — PROGRESS NOTES
PHYSICAL THERAPY EXAMINATION    PT Individual Minutes  Time In: 0916  Time Out: 1003  Minutes: 47                   Total Treatment Time:  47 Minutes         HPI:  Nancy Omalley is a 85 y.o. female patient who presented to [facility] on 2/7/2025 secondary to ground-level fall. Per H&P \" Nancy Omalley is a 84 y.o. female with medical history of hypertension, paroxysmal atrial fibrillation on anticoagulation with Coumadin, coronary disease presented to emergency room after mechanical fall that occurred prior to arrival. No loss of consciousness. She was at a local high school performance and was living auditorium when she tripped and fell hitting her head side of a metal door, landed on right hip. Patient developed deformity of right lower extremity and not able to weight-bear. Patient was brought to emergency room, x-ray right hip shows evidence of intertrochanteric fracture, orthopedics was contacted who recommended hospitalist admission. Patient denies any chest pain, shortness of breath. Patient history of coronary disease and follows with our cardiology team. CT head did not show any intracranial hemorrhage but shows evidence of scalp hematoma.  \"Patient underwent ORIF to the right femoral neck on 2/8 with Dr. Byers.  PM&R consulted to evaluate patient's rehabilitation and ongoing medical needs to determine best suited placement for patient once discharged from acute care.     Past Medical History:   Past Medical History:   Diagnosis Date    Accelerated hypertension 8/20/2014    Aortic valve insufficiency 8/20/2014    Arrhythmia     at fib    Arthritis     Atrial fibrillation (HCC) 8/20/2014    CAD (coronary artery disease)     Cancer (HCC)     basal    Congestive heart failure (CHF) (HCC) 10/29/2015    Coronary atherosclerosis 10/29/2015    GERD (gastroesophageal reflux disease)     Hypertension     Hypokalemia 8/20/2014    Nausea & vomiting     Pacemaker 8/20/2014    Thyroid disease      therapy per day for 10 days.  Thank you for the referral.    Goals:    Long Term Goals:  Pt will demonstrate roll left & right & transition supine<>sit with Modified Independent in 10 days  2.   Pt. will transfer from bed to/from chair with Modified Independent using the least restrictive device in 10 days   3.   Pt. will stand w/ RW with Modified [place] to complete ADL tasks in 10 days.  4.   Pt. Will perform HEP with Supervision/Standby Assist in 10 days  5.   Pt. Will propel w/c 150 feet with Modified Independent in 10 days      Pt would benefit from skilled physical therapy in order to improve independent functional mobility within the home. Interventions may include range of motion (AROM, PROM B LE/trunk), motor function (B LE/trunk strengthening/coordination), activity tolerance (vitals, oxygen saturation levels), bed mobility training, balance activities, gait training (progressive ambulation program), and functional transfer training.     Please see IRC; Interdisciplinary Eval, Care Plan, and Patient Education for further information regarding physical therapy examination and plan of care.       Lindsay Means, PT  2/11/2025

## 2025-02-12 PROBLEM — R65.10 SIRS (SYSTEMIC INFLAMMATORY RESPONSE SYNDROME) (HCC): Status: RESOLVED | Noted: 2021-07-03 | Resolved: 2025-02-12

## 2025-02-12 LAB
INR PPP: 2.1
PROTHROMBIN TIME: 24.6 SEC (ref 11.3–14.9)

## 2025-02-12 PROCEDURE — 97530 THERAPEUTIC ACTIVITIES: CPT

## 2025-02-12 PROCEDURE — 92523 SPEECH SOUND LANG COMPREHEN: CPT

## 2025-02-12 PROCEDURE — 6370000000 HC RX 637 (ALT 250 FOR IP): Performed by: STUDENT IN AN ORGANIZED HEALTH CARE EDUCATION/TRAINING PROGRAM

## 2025-02-12 PROCEDURE — 97535 SELF CARE MNGMENT TRAINING: CPT

## 2025-02-12 PROCEDURE — 99232 SBSQ HOSP IP/OBS MODERATE 35: CPT | Performed by: STUDENT IN AN ORGANIZED HEALTH CARE EDUCATION/TRAINING PROGRAM

## 2025-02-12 PROCEDURE — 97110 THERAPEUTIC EXERCISES: CPT

## 2025-02-12 PROCEDURE — 1180000000 HC REHAB R&B

## 2025-02-12 PROCEDURE — 6370000000 HC RX 637 (ALT 250 FOR IP): Performed by: PHYSICIAN ASSISTANT

## 2025-02-12 PROCEDURE — 36415 COLL VENOUS BLD VENIPUNCTURE: CPT

## 2025-02-12 PROCEDURE — 97542 WHEELCHAIR MNGMENT TRAINING: CPT

## 2025-02-12 PROCEDURE — 85610 PROTHROMBIN TIME: CPT

## 2025-02-12 RX ORDER — SENNA AND DOCUSATE SODIUM 50; 8.6 MG/1; MG/1
2 TABLET, FILM COATED ORAL
Status: DISCONTINUED | OUTPATIENT
Start: 2025-02-12 | End: 2025-02-21 | Stop reason: HOSPADM

## 2025-02-12 RX ORDER — LACTOBACILLUS RHAMNOSUS GG 10B CELL
1 CAPSULE ORAL
Status: DISCONTINUED | OUTPATIENT
Start: 2025-02-13 | End: 2025-02-21 | Stop reason: HOSPADM

## 2025-02-12 RX ORDER — WARFARIN SODIUM 1 MG/1
1 TABLET ORAL DAILY
Status: DISCONTINUED | OUTPATIENT
Start: 2025-02-12 | End: 2025-02-13

## 2025-02-12 RX ADMIN — OXYCODONE 5 MG: 5 TABLET ORAL at 11:31

## 2025-02-12 RX ADMIN — ACETAMINOPHEN 1000 MG: 500 TABLET, FILM COATED ORAL at 14:55

## 2025-02-12 RX ADMIN — ONDANSETRON 4 MG: 4 TABLET, ORALLY DISINTEGRATING ORAL at 12:15

## 2025-02-12 RX ADMIN — OXYCODONE 5 MG: 5 TABLET ORAL at 03:18

## 2025-02-12 RX ADMIN — OXYCODONE 5 MG: 5 TABLET ORAL at 23:56

## 2025-02-12 RX ADMIN — SOTALOL HYDROCHLORIDE 120 MG: 80 TABLET ORAL at 08:25

## 2025-02-12 RX ADMIN — POLYETHYLENE GLYCOL 3350 17 G: 17 POWDER, FOR SOLUTION ORAL at 13:01

## 2025-02-12 RX ADMIN — LEVOTHYROXINE SODIUM 137 MCG: 0.05 TABLET ORAL at 05:58

## 2025-02-12 RX ADMIN — PANTOPRAZOLE SODIUM 40 MG: 40 TABLET, DELAYED RELEASE ORAL at 05:58

## 2025-02-12 RX ADMIN — SOTALOL HYDROCHLORIDE 120 MG: 80 TABLET ORAL at 19:48

## 2025-02-12 RX ADMIN — OXYCODONE 5 MG: 5 TABLET ORAL at 19:47

## 2025-02-12 RX ADMIN — ACETAMINOPHEN 1000 MG: 500 TABLET, FILM COATED ORAL at 20:47

## 2025-02-12 RX ADMIN — DOCUSATE SODIUM 50 MG AND SENNOSIDES 8.6 MG 2 TABLET: 8.6; 5 TABLET, FILM COATED ORAL at 19:48

## 2025-02-12 RX ADMIN — WARFARIN SODIUM 1 MG: 1 TABLET ORAL at 17:37

## 2025-02-12 RX ADMIN — TRAZODONE HYDROCHLORIDE 50 MG: 50 TABLET ORAL at 21:59

## 2025-02-12 RX ADMIN — ACETAMINOPHEN 1000 MG: 500 TABLET, FILM COATED ORAL at 08:03

## 2025-02-12 ASSESSMENT — PAIN DESCRIPTION - LOCATION
LOCATION: HIP
LOCATION: LEG;HIP
LOCATION: HIP;LEG
LOCATION: HIP;LEG

## 2025-02-12 ASSESSMENT — PAIN - FUNCTIONAL ASSESSMENT
PAIN_FUNCTIONAL_ASSESSMENT: PREVENTS OR INTERFERES SOME ACTIVE ACTIVITIES AND ADLS
PAIN_FUNCTIONAL_ASSESSMENT: ACTIVITIES ARE NOT PREVENTED

## 2025-02-12 ASSESSMENT — PAIN DESCRIPTION - ORIENTATION
ORIENTATION: RIGHT

## 2025-02-12 ASSESSMENT — PAIN SCALES - GENERAL
PAINLEVEL_OUTOF10: 0
PAINLEVEL_OUTOF10: 7
PAINLEVEL_OUTOF10: 6
PAINLEVEL_OUTOF10: 7
PAINLEVEL_OUTOF10: 6
PAINLEVEL_OUTOF10: 0

## 2025-02-12 ASSESSMENT — PAIN DESCRIPTION - DESCRIPTORS
DESCRIPTORS: ACHING

## 2025-02-12 ASSESSMENT — PAIN DESCRIPTION - FREQUENCY: FREQUENCY: CONTINUOUS

## 2025-02-12 ASSESSMENT — PAIN DESCRIPTION - PAIN TYPE: TYPE: ACUTE PAIN

## 2025-02-12 ASSESSMENT — PAIN DESCRIPTION - ONSET: ONSET: ON-GOING

## 2025-02-12 NOTE — PLAN OF CARE
Problem: Safety - Adult  Goal: Free from fall injury  2/12/2025 0721 by Corey Estevez, RN  Outcome: Progressing  2/11/2025 2002 by Kim Echeverria, RN  Outcome: Progressing

## 2025-02-12 NOTE — PROGRESS NOTES
[facility] OCCUPATIONAL THERAPY DAILY NOTE  OT Individual Minutes  Time In: 0831  Time Out: 0917  Minutes: 46               Subjective: Pt agreeable to treatment and states she feels well this morning.  Pain: Patient reports 4/10 pain and RN notified .  Interdisciplinary Communication: Collaborated with SLP regarding pt performance and handoff communication.   Precautions: Falls, Impulsive, Poor Safety Awareness, Door Alarm,   Orthostatic hypotension, and WBAT RLE for transfers only  Lines:N/A  O2 Device: RA      FUNCTIONAL MOBILITY:       Bed Mobility Jose Raul and ModA Supine > EOB Jose Raul for RLE and EOB>Supine ModA for BLEs, technique     Sit to Stand Jose Raul    Transfer  Jose Raul  Transfer Type: SPT  Equipment: Grab Bars and RW Bed>WC, WC<>shower bench, WC>EOB  *Bed elevated   Ambulation NT  Equipment: NA      ACTIVITIES OF DAILY LIVING:       Eating   BROWN   Oral Hygiene   Item retrieval, cues to initiate   Shower/Bathe Partial/moderate assistance Jose Raul using HHSH, LH sponge, and shower bench + WC and grab bars   Upper Body  Dressing Setup or clean-up assistance BROWN. item retrieval for tshirt and bra   Lower Body Dressing Substantial/maximal assistance MaxA brief and pants   Donning/[place] Footwear Partial/moderate assistance ModA   Toileting Hygiene Partial/moderate assistance ModA   Toilet Transfer   ModA   Education Adaptive ADL Techniques, AE/DME Training, Benefits of OT, Energy Conservation, Pacing, Functional Transfer Training, Precautions, Rolling Walker Management, Safety Awareness, and Wheelchair Management     Assessment: Patient demo improved ability to perform bathing using AE and DME, as well as cues for techniques and new learning. Pt demonstrated good participation in OT treatment. Pt continues to benefit from skilled OT services to address remaining deficits and progress toward baseline level of independence and safety. Patient ended session supine in bed with call bell and needs within reach and with SLP ( pt

## 2025-02-12 NOTE — PROGRESS NOTES
[facility] OCCUPATIONAL THERAPY DAILY NOTE  OT Individual Minutes  Time In: 1346  Time Out: 1431  Minutes: 45               Subjective: Pt agreeable to treatment in gym.  Pain: No pain expressed.  Interdisciplinary Communication: Collaborated with PT regarding handoff communication.   Precautions: Falls, [place] Alarm, and WBAT RLE for transfers only  Lines:N/A  O2 Device: RA    FUNCTIONAL MOBILITY:       Bed Mobility Rebekah Supine>EOB, HOB inclined and bed adjustments   Sit to Stand Rebekah    Transfer  Rebekah  Transfer Type: SPT  Equipment: RW EOB>WC   Ambulation NT  Equipment: NA       - Activity Tolerance - Coordination - Self-Care   ACTIVITIES OF DAILY LIVING:       Donning/[place] Footwear Partial/moderate assistance ModA-MaxA shoes          - Activity Tolerance - Cognition - Coordination - Range of Motion - Strengthening - Visual-Perceptual    Pt engaged with medium soft Theraputty and beads to address hand strengthening, fine motor control, in-hand manipulation, and bimanual coordination for integration into functional tasks. Pt retrieved x 20 beads from putty. Pt then flattened putty on table, used wooden dowel to make circular indentations x 30, and replaced x20 beads into putty to further address hand/finger,  strengthening.   Pt cued for visual attention to task as compensatory strategy for sensory deficits.       Education   Benefits of OT, Energy Conservation, Pacing, Functional Transfer Training, and Safety Awareness     Assessment: Patient demo good activity tolerance during seated therapeutic activities. Pt demonstrated good participation in OT treatment. Pt continues to benefit from skilled OT services to address remaining deficits and progress toward baseline level of independence and safety. Patient ended session seated in w/c with PT.   Plan: Continue OT POC.     CARLO MARTINEZ/HARRIETT  OTD  2/12/2025

## 2025-02-12 NOTE — PROGRESS NOTES
[facility] OCCUPATIONAL THERAPY DAILY NOTE  OT Individual Minutes  Time In: 1035  Time Out: 1115  Minutes: 40               Subjective: Pt agreeable to treatment.   Pain: No pain expressed.  Interdisciplinary Communication: Collaborated with PA regarding pt performance.   Precautions: Falls, Impulsive, Poor Safety Awareness, [place] Alarm, and WBAT RLE for transfers only  Lines:N/A  O2 Device: RA    FUNCTIONAL MOBILITY:       Bed Mobility Rebekah    Sit to Stand Rebekah    Transfer  Rebekah and ModA  Transfer Type: SPT  Equipment: Grab Bars and RW Bed>WC, WC<>BSC over toilet   Ambulation NT  Equipment: NA       - Activity Tolerance - Balance - Coordination - Self-Care   ACTIVITIES OF DAILY LIVING:       Donning/[place] Footwear Partial/moderate assistance ModA   Toileting Hygiene Partial/moderate assistance Rebekah   Toilet Transfer Partial/moderate assistance Min-ModA, using WC and grab bar          - Activity Tolerance - Coordination - Energy Conservation/Pacing  - Range of Motion - Strengthening   Pt completed 10 minutes on the upper body ergometer, forward, with light resistance to increase R UE ROM, upper body strength and activity tolerance for integration into functional tasks.   Pt requires 3 brief rest breaks throughout. Pt completed 355 count total.      Pt completed BUE therapeutic exercises while sitting to address AROM, strength, coordination, and activity tolerance to promote safe return to maximal independence with I/ADLs. Pt required 3 brief rest breaks throughout.     Exercise   [x] Bicep Curls    Pt completed 20 repetitions x 3 sets utilizing 3 lb. dowel lawrence.  Pt cued for form and technique.     Education   Benefits of OT, Energy Conservation, Pacing, Functional Transfer Training, Precautions, Rolling Walker Management, Safety Awareness, and Wheelchair Management     Assessment: Patient demo ability to perform toilet routine with Min-ModA, and is progressing towards LTGs. Pt demonstrated good participation in OT

## 2025-02-12 NOTE — PROGRESS NOTES
Performed by: Elaine    Basic Metabolic Panel    Collection Time: 02/11/25  4:58 AM   Result Value Ref Range    Sodium 142 136 - 145 mmol/L    Potassium 3.7 3.5 - 5.1 mmol/L    Chloride 105 98 - 107 mmol/L    CO2 28 20 - 29 mmol/L    Anion Gap 9 7 - 16 mmol/L    Glucose 136 (H) 70 - 99 mg/dL    BUN 24 (H) 8 - 23 MG/DL    Creatinine 0.86 0.60 - 1.10 MG/DL    Est, Glom Filt Rate 66 >60 ml/min/1.73m2    Calcium 9.5 8.8 - 10.2 MG/DL   CBC with Auto Differential    Collection Time: 02/11/25  4:58 AM   Result Value Ref Range    WBC 8.8 4.3 - 11.1 K/uL    RBC 4.35 4.05 - 5.2 M/uL    Hemoglobin 12.4 11.7 - 15.4 g/dL    Hematocrit 38.4 35.8 - 46.3 %    MCV 88.3 82 - 102 FL    MCH 28.5 26.1 - 32.9 PG    MCHC 32.3 31.4 - 35.0 g/dL    RDW 16.6 (H) 11.9 - 14.6 %    Platelets 177 150 - 450 K/uL    MPV 10.4 9.4 - 12.3 FL    nRBC 0.00 0.0 - 0.2 K/uL    Differential Type AUTOMATED      Neutrophils % 60.6 43.0 - 78.0 %    Lymphocytes % 23.8 13.0 - 44.0 %    Monocytes % 11.1 4.0 - 12.0 %    Eosinophils % 2.8 0.5 - 7.8 %    Basophils % 0.7 0.0 - 2.0 %    Immature Granulocytes % 1.0 0.0 - 5.0 %    Neutrophils Absolute 5.32 1.70 - 8.20 K/UL    Lymphocytes Absolute 2.09 0.50 - 4.60 K/UL    Monocytes Absolute 0.98 0.10 - 1.30 K/UL    Eosinophils Absolute 0.25 0.00 - 0.80 K/UL    Basophils Absolute 0.06 0.00 - 0.20 K/UL    Immature Granulocytes Absolute 0.09 0.0 - 0.5 K/UL   Magnesium    Collection Time: 02/11/25  4:58 AM   Result Value Ref Range    Magnesium 2.0 1.8 - 2.4 mg/dL   Protime-INR    Collection Time: 02/11/25  4:58 AM   Result Value Ref Range    Protime 18.5 (H) 11.3 - 14.9 sec    INR 1.4     Protime-INR    Collection Time: 02/12/25  6:19 AM   Result Value Ref Range    Protime 24.6 (H) 11.3 - 14.9 sec    INR 2.1           Bowel / Bladder Program:  Last BM (including prior to admit): 02/07/25    Urine Assessment  Urinary Status: Voiding  Urinary Incontinence: Absent  Urine Color: Yellow/straw  Urine Appearance:  unattended. Discussed strategy to reduce the risk of falls which include understanding patient’s known fall risk factors and management of the risk factors identified, such as modification of home environment, avoiding or decreasing psychoactive medications. Sitting for 3-5 minutes prior to standing for blood pressure acclimation and avoiding orthostasis. Encouraged continuing exercise therapies which were/will be taught and implemented at [facility], particularly balance, strength, and gait training.  Avoid if possible the following medications which can contribute to delirium: benzodiazepines, antihistamines, and anticholinergics  Avoid sleep disruption by rescheduling early morning blood draws, avoiding middle of the night vital signs or disturbances, minimizing noise.   Keep lights on and blinds open during the day; lights and TV off at night; minimize tethers including urinary catheters; use glasses, hearing aids, and dentures as appropriate.  Avoid long periods of sleep during the day. Brief naps can be helpful but long periods of sleep can disrupt the sleep wake cycle for this patient.  Reorientation and visual cues for orientation should be tried  Maintain hydration and bowel function        Disposition: To be determined        Outpatient Provider Follow-up Appointments:  No follow-up provider specified.       The patient is able to tolerate and benefit from multidisciplinary acute inpatient rehabilitation.    Greater than 35 minutes were spent in direct patient care, discussing current medications, medical conditions, dispo planning, and continuation of therapy in the community. More than half of total time was spent in counseling and coordination of care with patient, nursing staff, therapy team and case management.        Part of this note may have been written by using a voice dictation software.  The note has been proof read but may still contain some grammatical/other typographical errors.      Jennifer

## 2025-02-12 NOTE — PROGRESS NOTES
PHYSICAL THERAPY DAILY NOTE    PT Individual Minutes  Time In: 1115  Time Out: 1209  Minutes: 54                 Total Treatment Time:  54 Minutes    Pt. Seen for: AM, Therapeutic Exercise, Transfer Training, and Wheelchair mobility     Subjective: Pt. Reports she doesn't think she has had a BM since admission.  Reports ongoing nausea when mobilizing.  \"I think it might have to do with my constipation.\"         Objective:  Restrictions/Precautions: Fall Risk, Weight Bearing   Right Lower Extremity Weight Bearing: Weight Bearing As Tolerated (for transfers only;  static standing allowed)                    GROSS ASSESSMENT   Pt. Up in w/c upon arrival.        COGNITION Daily Assessment        Overall Cognitive Status: WFL        BED/MAT MOBILITY Daily Assessment     Supine to Sit: Substantial/Maximal assistance (for R LE as well as trunk righting)  Sit to Supine: Partial/Moderate assistance (for trunk lowering & R LE)  Scooting: Supervision        TRANSFERS Daily Assessment   Toilet transfer w/ RW min A Sit to Stand: Minimal Assistance  Stand to Sit: Minimal Assistance  Bed to Chair: Minimal assistance (w/ RW)          GAIT Daily Assessment     N/A       STEPS/STAIRS Daily Assessment     N/A         BALANCE Daily Assessment    Static Sitting: Good:  Pt. able to maintain balance w/o UE support;  exhibits some postural sway  Dynamic Sitting: Good - accepts moderate challenge;  can maintain balance while picking object off the floor  Static Standing: Fair:  Pt. requires UE support;  may need occasional min A  Dynamic Standing: Poor - unable to accept challenge or move without LOB        WHEELCHAIR MOBILITY Daily Assessment   Began w/c training this AM, educating pt. On how to set-up for transfer Wheelchair Parts Management: Yes  Left Leg Rest Level of Assistance: Dependent/Total  Right Leg Rest Level of Assistance: Dependent/Total  Left Brakes Level of Assistance: Supervision  Right Brakes Level of Assistance:

## 2025-02-12 NOTE — PROGRESS NOTES
Warfarin dosing per pharmacist    Nancy Omalley is a 85 y.o. female.    Height: 165.1 cm (5' 5\")  Weight - Scale: 60.8 kg (134 lb)    Indication:  Afib    Goal INR:  2-3    Home dose:  5 mg on Fridays and 2.5 mg all remaining days    Risk factors or significant drug interactions:  post-op hip fracture due to fall    Other anticoagulants:  none    Lab Results   Component Value Date/Time    HGB 12.4 02/11/2025 04:58 AM    HGB 12.7 02/10/2025 05:06 AM    HGB 12.7 02/09/2025 07:03 AM     Lab Results   Component Value Date/Time    INR 2.1 02/12/2025 06:19 AM    INR 2.6 04/25/2022 11:35 AM        Daily Monitoring  Date  INR     Warfarin dose Notes  2/7  1.6  Held Vit K IV 10 mg  2/8  1.5  5 mg   2/9  1.2  5 mg   2/10  1.2  5 mg  2/11  1.4  2.5 mg  2/12  2.1  1 mg      Pharmacy was consulted to assist with warfarin dosing for Ms. Omalley during this admission. Her home regimen, indication, and INR goal were confirmed via reviewing an outpatient anticoagulation note. Of note, INR was subtherapeutic at her last appointment on 1.7 and again on admission at 1.6. Vitamin K 10 mg was administered overnight on 2/7 pre-operatively. Warfarin was resumed by ortho surgery on 2/8 POD0.    INR with large jump from 1.4 to 2.1. Will give 1 mg tonight. Daily INR.    Pharmacy will continue to follow. Please call with any questions.    Thank you,  Rima Pappas, [facility]

## 2025-02-12 NOTE — THERAPY EVALUATION
[facility]  SPEECH LANGUAGE PATHOLOGY: COMMUNICATION Initial Assessment and Discharge    MRN: [id number]    ADMISSION DATE: 2/10/2025  ADMITTING DIAGNOSIS: Mild traumatic brain injury, without loss of consciousness, sequela [S06.9X0S]  PRIMARY DIAGNOSIS: Mild traumatic brain injury, without loss of consciousness, sequela  ICD-10: Treatment Diagnosis:  R41.841 Cognitive-Communication Deficit    RECOMMENDATIONS:   Patient continues to require skilled intervention:   No ST indicated-pt at cognitive baseline.       ASSESSMENT   patient cognitive linguistic function within functional limits. 30/30 on SLUMS. reports some mild intermittent confusion while in ER which resolved. no ST indicated.        GENERAL   History of Present Injury/Illness: per chart, \"HPI:  Nancy Omalley is a 85 y.o. female patient who presented to [facility] on 2/7/2025 secondary to ground-level fall. Per H&P \" Nancy Omalley is a 84 y.o. female with medical history of hypertension, paroxysmal atrial fibrillation on anticoagulation with Coumadin, coronary disease presented to emergency room after mechanical fall that occurred prior to arrival. No loss of consciousness. She was at a local high school performance and was living auditorium when she tripped and fell hitting her head side of a metal door, landed on right hip. Patient developed deformity of right lower extremity and not able to weight-bear. Patient was brought to emergency room, x-ray right hip shows evidence of intertrochanteric fracture, orthopedics was contacted who recommended hospitalist admission. Patient denies any chest pain, shortness of breath. Patient history of coronary disease and follows with our cardiology team. CT head did not show any intracranial hemorrhage but shows evidence of scalp hematoma.  \"Patient underwent ORIF to the right femoral neck on 2/8 with Dr. Byers.  PM&R consulted to evaluate patient's rehabilitation and ongoing medical needs to determine

## 2025-02-12 NOTE — PLAN OF CARE
Problem: Safety - Adult  Goal: Free from fall injury  2/11/2025 2002 by Kim Echeverria, RN  Outcome: Progressing  2/11/2025 0938 by Bryn Naranjo, RN  Outcome: Progressing

## 2025-02-13 LAB
INR PPP: 2.1
PROTHROMBIN TIME: 24.6 SEC (ref 11.3–14.9)

## 2025-02-13 PROCEDURE — 97530 THERAPEUTIC ACTIVITIES: CPT

## 2025-02-13 PROCEDURE — 6370000000 HC RX 637 (ALT 250 FOR IP): Performed by: PHYSICIAN ASSISTANT

## 2025-02-13 PROCEDURE — 1180000000 HC REHAB R&B

## 2025-02-13 PROCEDURE — 99233 SBSQ HOSP IP/OBS HIGH 50: CPT | Performed by: STUDENT IN AN ORGANIZED HEALTH CARE EDUCATION/TRAINING PROGRAM

## 2025-02-13 PROCEDURE — 97110 THERAPEUTIC EXERCISES: CPT

## 2025-02-13 PROCEDURE — 97535 SELF CARE MNGMENT TRAINING: CPT

## 2025-02-13 PROCEDURE — 6370000000 HC RX 637 (ALT 250 FOR IP): Performed by: STUDENT IN AN ORGANIZED HEALTH CARE EDUCATION/TRAINING PROGRAM

## 2025-02-13 PROCEDURE — 2500000003 HC RX 250 WO HCPCS: Performed by: STUDENT IN AN ORGANIZED HEALTH CARE EDUCATION/TRAINING PROGRAM

## 2025-02-13 PROCEDURE — 85610 PROTHROMBIN TIME: CPT

## 2025-02-13 PROCEDURE — 36415 COLL VENOUS BLD VENIPUNCTURE: CPT

## 2025-02-13 PROCEDURE — 97112 NEUROMUSCULAR REEDUCATION: CPT

## 2025-02-13 PROCEDURE — 97542 WHEELCHAIR MNGMENT TRAINING: CPT

## 2025-02-13 RX ORDER — WARFARIN SODIUM 5 MG/1
2.5 TABLET ORAL DAILY
Status: DISCONTINUED | OUTPATIENT
Start: 2025-02-13 | End: 2025-02-14

## 2025-02-13 RX ORDER — POLYETHYLENE GLYCOL 3350 17 G/17G
17 POWDER, FOR SOLUTION ORAL DAILY
Status: DISCONTINUED | OUTPATIENT
Start: 2025-02-13 | End: 2025-02-14

## 2025-02-13 RX ADMIN — ACETAMINOPHEN 1000 MG: 500 TABLET, FILM COATED ORAL at 14:47

## 2025-02-13 RX ADMIN — LEVOTHYROXINE SODIUM 137 MCG: 0.05 TABLET ORAL at 04:17

## 2025-02-13 RX ADMIN — WARFARIN SODIUM 2.5 MG: 5 TABLET ORAL at 18:02

## 2025-02-13 RX ADMIN — PANTOPRAZOLE SODIUM 40 MG: 40 TABLET, DELAYED RELEASE ORAL at 04:18

## 2025-02-13 RX ADMIN — ACETAMINOPHEN 1000 MG: 500 TABLET, FILM COATED ORAL at 20:44

## 2025-02-13 RX ADMIN — METOPROLOL SUCCINATE 25 MG: 25 TABLET, EXTENDED RELEASE ORAL at 08:32

## 2025-02-13 RX ADMIN — SOTALOL HYDROCHLORIDE 120 MG: 80 TABLET ORAL at 08:31

## 2025-02-13 RX ADMIN — TRAZODONE HYDROCHLORIDE 50 MG: 50 TABLET ORAL at 20:44

## 2025-02-13 RX ADMIN — POLYETHYLENE GLYCOL 3350 17 G: 17 POWDER, FOR SOLUTION ORAL at 11:55

## 2025-02-13 RX ADMIN — Medication 1 CAPSULE: at 08:32

## 2025-02-13 RX ADMIN — DOCUSATE SODIUM 50 MG AND SENNOSIDES 8.6 MG 2 TABLET: 8.6; 5 TABLET, FILM COATED ORAL at 20:44

## 2025-02-13 RX ADMIN — OXYCODONE 5 MG: 5 TABLET ORAL at 04:57

## 2025-02-13 RX ADMIN — SOTALOL HYDROCHLORIDE 120 MG: 80 TABLET ORAL at 20:44

## 2025-02-13 RX ADMIN — ONDANSETRON 4 MG: 4 TABLET, ORALLY DISINTEGRATING ORAL at 11:25

## 2025-02-13 RX ADMIN — ACETAMINOPHEN 1000 MG: 500 TABLET, FILM COATED ORAL at 08:30

## 2025-02-13 RX ADMIN — SODIUM CHLORIDE, PRESERVATIVE FREE 10 ML: 5 INJECTION INTRAVENOUS at 11:28

## 2025-02-13 RX ADMIN — SODIUM CHLORIDE, PRESERVATIVE FREE 10 ML: 5 INJECTION INTRAVENOUS at 20:53

## 2025-02-13 RX ADMIN — BISACODYL 10 MG: 10 SUPPOSITORY RECTAL at 20:42

## 2025-02-13 RX ADMIN — OXYCODONE 5 MG: 5 TABLET ORAL at 11:57

## 2025-02-13 ASSESSMENT — PAIN DESCRIPTION - ORIENTATION
ORIENTATION: RIGHT
ORIENTATION: RIGHT

## 2025-02-13 ASSESSMENT — PAIN SCALES - GENERAL
PAINLEVEL_OUTOF10: 1
PAINLEVEL_OUTOF10: 2
PAINLEVEL_OUTOF10: 7
PAINLEVEL_OUTOF10: 0
PAINLEVEL_OUTOF10: 7
PAINLEVEL_OUTOF10: 3
PAINLEVEL_OUTOF10: 0

## 2025-02-13 ASSESSMENT — PAIN DESCRIPTION - PAIN TYPE: TYPE: ACUTE PAIN

## 2025-02-13 ASSESSMENT — PAIN DESCRIPTION - ONSET: ONSET: GRADUAL

## 2025-02-13 ASSESSMENT — PAIN DESCRIPTION - DESCRIPTORS
DESCRIPTORS: ACHING
DESCRIPTORS: ACHING;THROBBING

## 2025-02-13 ASSESSMENT — PAIN - FUNCTIONAL ASSESSMENT: PAIN_FUNCTIONAL_ASSESSMENT: PREVENTS OR INTERFERES SOME ACTIVE ACTIVITIES AND ADLS

## 2025-02-13 ASSESSMENT — PAIN DESCRIPTION - LOCATION
LOCATION: HIP
LOCATION: HIP;INCISION

## 2025-02-13 ASSESSMENT — PAIN DESCRIPTION - FREQUENCY: FREQUENCY: CONTINUOUS

## 2025-02-13 NOTE — PATIENT CARE CONFERENCE
[facility] [place], [place]  INPATIENT REHABILITATION  TEAM CONFERENCE NOTE    Conference Date: 2025  Admit Date: 2/10/2025  4:47 PM  Patient Name: Nancy Omalley    MRN: [id number]    : 1940 (85 y.o.)  Rehabilitation Admitting Diagnosis: Mild traumatic brain injury, without loss of consciousness, sequela [S06.9X0S]           Team Members Present at Conference:  : Hailee Lyn CM  Nurse:Reyna Pappas RN  Occupational Therapist:CARLO Swift/HARRIETT OTCHIQUIS  Physical Therapist: Lindsay Means PT  Speech Therapist: DANIELLA Lorenzo d/s      ---------------------------------------------------------------------------------------------------    Case Management:  Patient's PLOF: Independent with IADLs, Independent with ADLs, and Independent with mobility  Patient's Prior Living Situation: Lives with spouse/significant other  Baseline Supervision/Assistance: None  Current issues/needs regarding patient and family discharge status: None anticipated       Patient, son (Nito) present for conference.  Shayan on phone for conference.   ---------------------------------------------------------------------------------------------------    Functional Assessment:  Most Recent Mobility Scores Per Physical Therapy:   Bed/Mat Mobility Supine to Sit: Substantial/Maximal assistance (for R LE as well as trunk righting)  Sit to Supine: Minimal assistance (for R LE)  Scooting: Supervision   Transfers Sit to Stand: Minimal Assistance  Stand to Sit: Minimal Assistance;Contact guard assistance  Bed to Chair: Contact guard assistance (w/ RW)    Gait            Steps/Stairs          Wheelchair Mobility Wheelchair Parts Management: Yes  Left Leg Rest Level of Assistance: Dependent/Total  Right Leg Rest Level of Assistance: Dependent/Total  Left Brakes Level of Assistance: Supervision  Right Brakes Level of Assistance: Supervision  Propulsion: Yes   Propulsion: Manual  Level: Level Tile  Method: RUE;MANASE  Level of  immobility. Therefore, requires continued routine monitoring with appropriate bladder management as indicated to avoid urinary tract complications.      Recent Labs     02/10/25  1143   POCGLU 118*     No results found for: \"LDLDIRECT\"        ---------------------------------------------------------------------------------------------------    Discharge Plan:  Family Training and Education: yes  Education Topics: ADL Training using AE/DME prn, Mobility Training using AE/DME prn, and Safety , level of assist recommended for discharge, WBAT RLE for transfers only, OK for static standing activities   Estimated Discharge Date: 2/24/2025  Discharge Destination: Home with  home health  Services at Discharge: Home Health: Physical Therapy, Occupational Therapy, and Nursing, aide   Equipment at Discharge:  has a shower chair, RW (?) and commode, recommend a ramp  and will need a wheelchair   Barriers to the achievement of predicted outcomes: limited to WBAT for transfers only    ---------------------------------------------------------------------------------------------------        Scribed by: ROLAND JIANG OT on 2/13/2025 at 9:12 AM    I approve the established interdisciplinary plan of care as documented within the medical record of Nancy Omalley. I was present and led the interdisciplinary care conference.    Dr. Jennifer Gonzalez, D.O.

## 2025-02-13 NOTE — PROGRESS NOTES
PHYSICAL THERAPY DAILY NOTE    PT Individual Minutes  Time In: 0915  Time Out: 1003  Minutes: 48                 Total Treatment Time:  48 Minutes    Pt. Seen for: AM, Balance Training, Patient Education, Therapeutic Exercise, and Transfer Training     Subjective: Patient agreeable to treatment today. States having pain to the hip area. Rates pain at 1/10 at rest and 3/10 with activities. Last treatment was good. No new complaints at this time.            Objective:  Restrictions/Precautions: Fall Risk, Weight Bearing  Required Braces or Orthoses?: No   Right Lower Extremity Weight Bearing: Weight Bearing As Tolerated (Transfers only)          Other Position/Activity Restrictions: WBAT for transfers only; ststic standing         GROSS ASSESSMENT   NT today        COGNITION Daily Assessment    Overall Orientation Status: Within Normal Limits  Orientation Level: Oriented X4   Overall Cognitive Status: WFL        BED/MAT MOBILITY Daily Assessment   Additional time and minor verbal cues needed  Rolling to Left: Minimal assistance  Rolling to Right: Minimal assistance  Supine to Sit: Partial/Moderate assistance  Sit to Supine: Minimal assistance  Scooting: Supervision        TRANSFERS Daily Assessment   Additional time and minor verbal cues needed Sit to Stand: Minimal Assistance  Stand to Sit: Minimal Assistance;Contact guard assistance  Bed to Chair: Contact guard assistance  Stand Pivot Transfers: Contact guard assistance          GAIT Daily Assessment   Gait pattern: WBAT for transfers.  Base of Support: Narrow  Interventions: Safety awareness training; Tactile cues; Verbal cues       Device: Rolling Walker  Assistance: Contact guard assistance;Minimal assistance  Quality of Gait: Transfers  More Ambulation?: No              STEPS/STAIRS Daily Assessment    Stairs?: No              BALANCE Daily Assessment    Static Sitting: Good:  Pt. able to maintain balance w/o UE support;  exhibits some postural sway  Dynamic

## 2025-02-13 NOTE — PROGRESS NOTES
PHYSICAL THERAPY DAILY NOTE    PT Individual Minutes  Time In: 1115  Time Out: 1140  Minutes: 25                 Total Treatment Time:  25 Minutes    Pt. Seen for: AM, Balance Training, Therapeutic Exercise, and Transfer Training     Subjective: Patient states very nausea at this time.         Objective:  Restrictions/Precautions: Fall Risk, Weight Bearing  Required Braces or Orthoses?: No   Right Lower Extremity Weight Bearing: Weight Bearing As Tolerated          Other Position/Activity Restrictions: WBAT for transfers only; ststic standing         GROSS ASSESSMENT   NT        COGNITION Daily Assessment    Overall Orientation Status: Within Normal Limits  Orientation Level: Oriented X4   Overall Cognitive Status: WFL        BED/MAT MOBILITY Daily Assessment     Rolling to Left: Minimal assistance  Rolling to Right: Minimal assistance  Supine to Sit: Partial/Moderate assistance  Sit to Supine: Minimal assistance  Scooting: Supervision  Bed Mobility Comments: NT this PM        TRANSFERS Daily Assessment   Minor verbal cues Sit to Stand: Minimal Assistance  Stand to Sit: Minimal Assistance;Contact guard assistance  Bed to Chair: Contact guard assistance  Stand Pivot Transfers: Contact guard assistance          GAIT Daily Assessment   Transfers only Device: Rolling Walker  Assistance: Contact guard assistance;Minimal assistance  Quality of Gait: Transfers  More Ambulation?: No              STEPS/STAIRS Daily Assessment    Stairs?: No              BALANCE Daily Assessment    Static Sitting: Good:  Pt. able to maintain balance w/o UE support;  exhibits some postural sway  Dynamic Sitting: Fair - accepts minimal challenge;  can maintain balance while turning head/trunk  Static Standing: NT  Dynamic Standing: NT       WHEELCHAIR MOBILITY Daily Assessment    Wheelchair Type: Standard  Wheelchair Parts Management: Yes  Left Leg Rest Level of Assistance: Dependent/Total  Right Leg Rest Level of Assistance:

## 2025-02-13 NOTE — PROGRESS NOTES
[place] [address]  Tel: [phone]     Physical Medicine and Rehabilitation Progress Note      Nancy Omalley  Admit Date: 2/10/2025  Admit Diagnosis: Mild traumatic brain injury, without loss of consciousness, sequela [S06.9X0S]    Subjective     Patient seen this morning during break in therapy and again during team conference, family present. We discussed her long-standing nausea; at home patient takes 1 ondansetron ODT QAM upon first arising, sits EOB until nausea fades then starts her day. Remains constipated; will change MiraLAX from PRN to QD scheduled and add probiotic.  Nursing reports orthostatics negative this morning.  All questions and concerns were addressed at this time.    Team conference held and attended today.    Objective:     Current Facility-Administered Medications   Medication Dose Route Frequency    polyethylene glycol (GLYCOLAX) packet 17 g  17 g Oral Daily    warfarin (COUMADIN) tablet 1 mg  1 mg Oral Daily    sennosides-docusate sodium (SENOKOT-S) 8.6-50 MG tablet 2 tablet  2 tablet Oral QHS    lactobacillus (CULTURELLE) capsule 1 capsule  1 capsule Oral Daily with breakfast    levothyroxine (SYNTHROID) tablet 137 mcg  137 mcg Oral QAM AC    pantoprazole (PROTONIX) tablet 40 mg  40 mg Oral QAM AC    acetaminophen (TYLENOL) tablet 1,000 mg  1,000 mg Oral TID    [Held by provider] losartan (COZAAR) tablet 50 mg  50 mg Oral Daily    metoprolol succinate (TOPROL XL) extended release tablet 25 mg  25 mg Oral Daily    sotalol (BETAPACE) tablet 120 mg  120 mg Oral Q12H    traZODone (DESYREL) tablet 50 mg  50 mg Oral Nightly PRN    sodium phosphate (FLEET) rectal enema 1 enema  1 enema Rectal Daily PRN    bisacodyl (DULCOLAX) suppository 10 mg  10 mg Rectal Daily PRN    calcium carbonate (TUMS) chewable tablet 500 mg  500 mg Oral TID PRN    hydrALAZINE (APRESOLINE) tablet 10 mg  10 mg Oral TID PRN    ondansetron (ZOFRAN-ODT)  palpation/dorsiflexion. No edema.  SKIN: R LE incisions with dressings in place.  MSK: Decreased strength and guarding in proximal R LE but antigravity strength at HF, KE/KF. B UE and L LE strength full and unencumbered.  NEURO: No new focal motor or sensory deficits.  PSYCH: Cooperative and calm, normal insight and judgment          Labs/Studies:  Recent Results (from the past 72 hour(s))   Basic Metabolic Panel    Collection Time: 02/11/25  4:58 AM   Result Value Ref Range    Sodium 142 136 - 145 mmol/L    Potassium 3.7 3.5 - 5.1 mmol/L    Chloride 105 98 - 107 mmol/L    CO2 28 20 - 29 mmol/L    Anion Gap 9 7 - 16 mmol/L    Glucose 136 (H) 70 - 99 mg/dL    BUN 24 (H) 8 - 23 MG/DL    Creatinine 0.86 0.60 - 1.10 MG/DL    Est, Glom Filt Rate 66 >60 ml/min/1.73m2    Calcium 9.5 8.8 - 10.2 MG/DL   CBC with Auto Differential    Collection Time: 02/11/25  4:58 AM   Result Value Ref Range    WBC 8.8 4.3 - 11.1 K/uL    RBC 4.35 4.05 - 5.2 M/uL    Hemoglobin 12.4 11.7 - 15.4 g/dL    Hematocrit 38.4 35.8 - 46.3 %    MCV 88.3 82 - 102 FL    MCH 28.5 26.1 - 32.9 PG    MCHC 32.3 31.4 - 35.0 g/dL    RDW 16.6 (H) 11.9 - 14.6 %    Platelets 177 150 - 450 K/uL    MPV 10.4 9.4 - 12.3 FL    nRBC 0.00 0.0 - 0.2 K/uL    Differential Type AUTOMATED      Neutrophils % 60.6 43.0 - 78.0 %    Lymphocytes % 23.8 13.0 - 44.0 %    Monocytes % 11.1 4.0 - 12.0 %    Eosinophils % 2.8 0.5 - 7.8 %    Basophils % 0.7 0.0 - 2.0 %    Immature Granulocytes % 1.0 0.0 - 5.0 %    Neutrophils Absolute 5.32 1.70 - 8.20 K/UL    Lymphocytes Absolute 2.09 0.50 - 4.60 K/UL    Monocytes Absolute 0.98 0.10 - 1.30 K/UL    Eosinophils Absolute 0.25 0.00 - 0.80 K/UL    Basophils Absolute 0.06 0.00 - 0.20 K/UL    Immature Granulocytes Absolute 0.09 0.0 - 0.5 K/UL   Magnesium    Collection Time: 02/11/25  4:58 AM   Result Value Ref Range    Magnesium 2.0 1.8 - 2.4 mg/dL   Protime-INR    Collection Time: 02/11/25  4:58 AM   Result Value Ref Range    Protime 18.5 (H)  prior to admit): 02/07/25      //Orthostasis  -Morning time nausea chronic, may be related to low BP?  -Ordered orthostatics BID  -Losartan held morning 2/12, hold parameters in place  -May benefit from TEDs, +/- abd binder when OOB with therapy  -Nursing reports orthostatics negative this a.m.  Orthostatic Vitals:      2/13/2025     7:35 AM   Orthostatic Vitals   Blood Pressure Standing 105/58   Pulse Standing 75 PER MINUTE             //Gait and Self-care deficits secondary to right femoral neck fracture  -S/p ORIF with Dr. Byers on 2/8  -Weightbearing as tolerated to the right lower extremity for transfers only.  -Staples can be removed 14 days s/p surgery (after 2/22)  //Mild TBI  -Notable confusion s/p surgery. Encephalopathy versus sundowning  -Scalp hematoma noted. Repeat head CT without any acute intracranial abnormalities  -High risk given long-term anticoagulation use  //Gait instability  - Continue inpatient rehabilitation due to ongoing functional deficits below baseline.   - Ongoing and underlying medical conditions currently inhibiting safe transition home and back into the community. Patient requires inpatient rehabilitation facility to continue monitoring and treating medical conditions while completing 3 hours of therapy a day to help decrease chances of hospital readmission. Provision of services in a less intensive environment risks significant complications and more limited functional outcomes.  - Anticipated LOS 10-14 days  - Patient exhibits the ability to tolerate active participation in at least three hours of therapy services per day, five days a week to include Physical Therapy and Occupational Therapy in addition to SLP if indicated. Furthermore, underlying medical problems present potential risk for decompensation and therefore requires continued close physician monitoring with management as indicated in addition to 24-hour rehabilitation nursing to manage and progress plan of care;

## 2025-02-13 NOTE — PROGRESS NOTES
[facility] OCCUPATIONAL THERAPY DAILY NOTE  OT Individual Minutes  Time In: 0832  Time Out: 0917  Minutes: 45               Subjective: Pt agreeable to treatment.   Pain: Patient reports DNQ/10 pain.  Interdisciplinary Communication: Collaborated with RN regarding handoff communication and d/c planning.   Precautions: Falls, [place] Alarm, and WBAT RLE for transfers only  Lines:N/A  O2 Device: RA      FUNCTIONAL MOBILITY:       Bed Mobility CGA Supine>EOB, HOB flat, CGA for RLE   Sit to Stand CGA    Transfer  CGA  Transfer Type: SPT  Equipment: RW    Ambulation NT  Equipment: NA      ACTIVITIES OF DAILY LIVING:       Eating Setup or clean-up assistance BROWN/CU   Oral Hygiene Independent Indep- performs item retrieval, performs seated at sink wc level   Shower/Bathe Partial/moderate assistance Rebekah bed bath   Upper Body  Dressing Setup or clean-up assistance BROWN. item retrieval for bra and tshirt   Lower Body Dressing Partial/moderate assistance Rebekah using AE   Donning/[place] Footwear Supervision or touching assistance CGA for socks and shoes using AE sock aid and LHSH   Education Adaptive ADL Techniques, AE/DME Training, Benefits of OT, Functional Transfer Training, Precautions, Rolling Walker Management, Safety Awareness, and Wheelchair Management     Assessment: Patient is motivated and receptive to therapist education/techniques. Pt demo improved ability to perform Donning footwear recalling trained strategies (e.g., using AE sock aid) and when also utilizing AE LHSH. Pt demonstrated good participation in OT treatment. Pt continues to benefit from skilled OT services to address remaining deficits and progress toward baseline level of independence and safety. Patient ended session seated in w/c with PT.   Plan: Continue OT POC.     CARLO MARTINEZ/HARRIETT OTD  2/13/2025

## 2025-02-13 NOTE — PLAN OF CARE
Problem: Chronic Conditions and Co-morbidities  Goal: Patient's chronic conditions and co-morbidity symptoms are monitored and maintained or improved  Outcome: Progressing     Problem: Skin/Tissue Integrity  Goal: Skin integrity remains intact  Description: 1.  Monitor for areas of redness and/or skin breakdown  2.  Assess vascular access sites hourly  3.  Every 4-6 hours minimum:  Change oxygen saturation probe site  4.  Every 4-6 hours:  If on nasal continuous positive airway pressure, respiratory therapy assess nares and determine need for appliance change or resting period  Outcome: Progressing     Problem: Safety - Adult  Goal: Free from fall injury  Outcome: Progressing     Problem: ABCDS Injury Assessment  Goal: Absence of physical injury  Outcome: Progressing     Problem: Pain  Goal: Verbalizes/displays adequate comfort level or baseline comfort level  Outcome: Progressing

## 2025-02-13 NOTE — PROGRESS NOTES
Warfarin dosing per pharmacist    Nancy Omalley is a 85 y.o. female.    Height: 165.1 cm (5' 5\")  Weight - Scale: 60.8 kg (134 lb)    Indication:  Afib    Goal INR:  2-3    Home dose:  5 mg on Fridays and 2.5 mg all remaining days    Risk factors or significant drug interactions:  post-op hip fracture due to fall    Other anticoagulants:  none    Lab Results   Component Value Date/Time    HGB 12.4 02/11/2025 04:58 AM    HGB 12.7 02/10/2025 05:06 AM    HGB 12.7 02/09/2025 07:03 AM     Lab Results   Component Value Date/Time    INR 2.1 02/13/2025 05:32 AM    INR 2.6 04/25/2022 11:35 AM        Daily Monitoring  Date  INR     Warfarin dose Notes  2/7  1.6  Held Vit K IV 10 mg  2/8  1.5  5 mg   2/9  1.2  5 mg   2/10  1.2  5 mg  2/11  1.4  2.5 mg  2/12  2.1  1 mg  2/13  2.1  2.5 mg      Pharmacy was consulted to assist with warfarin dosing for Ms. Omalley during this admission. Her home regimen, indication, and INR goal were confirmed via reviewing an outpatient anticoagulation note. Of note, INR was subtherapeutic at her last appointment on 1.7 and again on admission at 1.6. Vitamin K 10 mg was administered overnight on 2/7 pre-operatively. Warfarin was resumed by ortho surgery on 2/8 POD0.    INR remains stable and within therapeutic range at 2.1 today.    Will continue with warfarin 2.5 mg this evening.     Daily INR.    Pharmacy will continue to follow. Please call with any questions.    Thank you,  Kendell Heaton [facility]

## 2025-02-13 NOTE — PLAN OF CARE
[facility]  Rehab Physician Plan of Care Review     Patient Name:  Nancy Omalley          Expected LOS: 7-14 days  Rehabilitation IGC: Mild traumatic brain injury, without loss of consciousness, sequela [S06.9X0S]    Primary Rehabilitation (etiologic) diagnosis: TBI secondary to ground-level fall     Medical/Functional Prognosis: Good     Anticipated functional outcomes/goals and interventions: Performs mobility and self care activities at the highest level of function for planned discharge setting. Advance to the least restrictive diet without signs or symptoms of aspiration and demonstrate communication skills at the highest level of function for planned discharge setting.     Patient's/family's anticipated outcomes/personal goals: to improve strength, endurance, and independence      Physical therapy functional outcome/goal:  Bed mobility  Supine to Sit: Substantial/Maximal assistance (for R LE as well as trunk righting)  Sit to Supine: Minimal assistance (for R LE)  Scooting: Supervision   Transfers  Sit to Stand: Minimal Assistance  Stand to Sit: Minimal Assistance, Contact guard assistance  Bed to Chair: Contact guard assistance (w/ RW)                   Wheelchair Activities  Wheelchair Parts Management: Yes  Left Leg Rest Level of Assistance: Dependent/Total  Right Leg Rest Level of Assistance: Dependent/Total  Left Brakes Level of Assistance: Supervision  Right Brakes Level of Assistance: Supervision  Propulsion: Yes   Propulsion 1  Propulsion: Manual  Level: Level Tile  Method: KAYLYNN ROMERO  Level of Assistance: Minimal assistance      Occupational therapy functional outcome/goal:   Eating  Assistance Needed: Setup or clean-up assistance  Comment: BROWN  CARE Score: 5   Oral Hygiene  Assistance Needed: Supervision or touching assistance  Comment: Item retrieval, cues to initiate  CARE Score: 4  Shower/Bathe Self  Assistance Needed: Partial/moderate assistance  Comment: Rebekah using HHSH, LH sponge, and  shower bench + WC and grab bars  CARE Score: 3  Upper Body Dressing  Assistance Needed: Setup or clean-up assistance  Comment: BROWN. item retrieval for tshirt and bra  CARE Score: 5  Lower Body Dressing  Assistance Needed: Substantial/maximal assistance  Comment: MaxA brief and pants  CARE Score: 2  Putting On/Taking Off Footwear  Assistance Needed: Partial/moderate assistance  Comment: ModA  CARE Score: 3  Toileting Hygiene  Assistance needed: Partial/moderate assistance  Comment: Rebekah  CARE Score: 3  Toilet Transfer  Assistance needed: Partial/moderate assistance  Comment: Min-ModA, using WC and grab bar  CARE Score: 3     Speech Therapy functional outcome/goal:   patient cognitive linguistic function within functional limits. 30/30 on SLUMS. reports some mild intermittent confusion while in ER which resolved. no ST indicated.        Rehab Nursing: bowel and bladder program, routine skin care and prevention of pressure sores, education on medications effects and side effects, pain control as appropriate, DVT prevention, TEDs/SCDs as appropriate, mobilize, carry over learned skills in therapies, counseling.            Physician's anticipated interventions and functional outcomes: Modified independence with ADLs and mobility. Patient requires continued close monitoring of the following systems: CV: Monitor blood pressure and heart rate.  Adjust medications as needed; Endocrine: Monitor glycemia and adjust pharmacologic regimen as needed; Respiratory: Incentive spirometer every 2 hours while awake, as needed.  To be followed by respiratory therapist as needed; Sleep: Monitor sleep cycles and improve sleep quality as necessary to ensure adequate rest to be able to participate in daily therapy sessions; Skin: Monitor incisions/wounds for adequate healing.  Wound care coordinator consulted as needed.  Frequent turning while in bed and repositioning in chair.  Monitor for skin breakdown or erythema; Psychiatric: Monitor

## 2025-02-13 NOTE — PLAN OF CARE
Problem: Safety - Adult  Goal: Free from fall injury  2/12/2025 1912 by Kim Echeverria, RN  Outcome: Progressing  2/12/2025 0721 by Corey Estevez, RN  Outcome: Progressing

## 2025-02-13 NOTE — PROGRESS NOTES
Progress Note    Patient: Nancy Omalley MRN: [id number]  SSN: [id number]    YOB: 1940  Age: 85 y.o.  Sex: female      Admit Date: 2/10/2025    LOS: 3 days     Subjective:     Patient doing well. She is working hard with therapy. Does have pain to left leg after PT.  Spent a long time discussing nausea today. She reports it is worse in the morning when she wakes up.     Objective:     Vitals:    02/13/25 0733 02/13/25 0735 02/13/25 1124 02/13/25 1508   BP: 109/64 (!) 105/58 105/63 121/76   Pulse: 77 75 74 74   Resp:   18 14   Temp:   97.5 °F (36.4 °C) 98.8 °F (37.1 °C)   TempSrc:   Oral Oral   SpO2:   95% 94%   Weight:       Height:            Intake and Output:  Current Shift: No intake/output data recorded.  Last three shifts: 02/11 1901 - 02/13 0700  In: 150 [P.O.:150]  Out: -     alert and oriented to person place time and situation  Skin - dressing clean dry and intact. Moderate bruising.   Motor and sensory function intact in RIGHT LOWER extremity  Pulses palpable in RIGHT LOWER extremity       Lab/Data Review:  Recent Labs     02/11/25  0458   HGB 12.4   HCT 38.4          Assessment:   , POD #5 from right femoral neck system fixation for right femoral neck fracture which was an osteoporotic fracture and a bone that would have not fractured otherwise    Plan:     Patient may be weightbearing as tolerated for transfers only on the operative lower extremity.  They can be full active and passive range of motion of the  hip.  They can have dry dressing change  as needed.  They can have their Coumadin restarted as DVT prophylaxis.  They will need to have orthopedic follow-up approximately 2 weeks after discharge.    Will order POCT glucose for AM.    Signed By: CAIO Villegas     February 13, 2025

## 2025-02-13 NOTE — PROGRESS NOTES
Alert, oriented x 4.  Medicated for right hip pain.  OOB to BSC minimal assist.  Bed alarm on, call light in reach.

## 2025-02-13 NOTE — PROGRESS NOTES
[facility] OCCUPATIONAL THERAPY DAILY NOTE  OT Individual Minutes  Time In: 1025  Time Out: 1118  Minutes: 53               Subjective: Pt agreeable to treatment. Pt family member x1 present.  Pain: Patient reports DNQ/10 pain.  Interdisciplinary Communication: Collaborated with Physician, PT, RN, and care team during Team Conference regarding pt status, pt performance, handoff communication, and d/c planning.   Precautions: Falls, Poor Safety Awareness, [place] Alarm, and WBAT RLE for transfers only  Lines:N/A  O2 Device: RA    FUNCTIONAL MOBILITY:       Bed Mobility NT    Sit to Stand CGA    Transfer  CGA and Rebekah  Transfer Type: SPT  Equipment: Grab Bars, RW, and WC, BSC    Ambulation NT  Equipment: NA       - Balance - Coordination - Self-Care   ACTIVITIES OF DAILY LIVING:       WC Management/Training Supervision to Rebekah SUP to Rebekah for locking/unlocking brakes, turns, propelling up to table surfaces;  DEP A for doffing/donning BLE leg rests   Toileting Hygiene Supervision or touching assistance CGA   Toilet Transfer Partial/moderate assistance CGA-Rebekah using RW, WC, grab bar          - Activity Tolerance - Coordination - Energy Conservation/Pacing  - Range of Motion - Strengthening   Pt completed reaching, strengthening, coordination, and activity tolerance task while sitting for improved UE function (R UE ROM, L UE strengthening) and overall endurance for integration into ADL tasks. Pt utilized UE(s) to move rings up rungs of vertical ladder. Pt completed 12 reps x 2 sets with L UE with 1/2 # wrist weight; Pt completed 12 reps x 2 sets with R UE. Pt with improving accuracy as task progressed. Pt required 4 rest breaks throughout. Pt utilized weighted clothespin to  each ring to further challenge coordination and incorporate hand strengthening.  (Red mod resistance clothes pin).     Education   Adaptive ADL Techniques, AE/DME Training, Benefits of OT, Energy Conservation, Pacing, Functional Transfer Training,

## 2025-02-13 NOTE — CARE COORDINATION
RN CM in room for bedside rounds. Patient sitting up in chair, alert and oriented. Son also present with other family on the phone. Patient will need HH RN, PT, OT and choices will be given. Has a shower chair, toilet safety frame and standard walker. Will need WC and rolling walker? Has to have walker to transfer in and out of WC. RN CM to follow

## 2025-02-14 LAB
ANION GAP SERPL CALC-SCNC: 9 MMOL/L (ref 7–16)
BASOPHILS # BLD: 0.08 K/UL (ref 0–0.2)
BASOPHILS NFR BLD: 1 % (ref 0–2)
BUN SERPL-MCNC: 15 MG/DL (ref 8–23)
CALCIUM SERPL-MCNC: 8.7 MG/DL (ref 8.8–10.2)
CHLORIDE SERPL-SCNC: 105 MMOL/L (ref 98–107)
CO2 SERPL-SCNC: 24 MMOL/L (ref 20–29)
CREAT SERPL-MCNC: 0.56 MG/DL (ref 0.6–1.1)
DIFFERENTIAL METHOD BLD: ABNORMAL
EOSINOPHIL # BLD: 0.09 K/UL (ref 0–0.8)
EOSINOPHIL NFR BLD: 1.1 % (ref 0.5–7.8)
ERYTHROCYTE [DISTWIDTH] IN BLOOD BY AUTOMATED COUNT: 16.2 % (ref 11.9–14.6)
GLUCOSE SERPL-MCNC: 134 MG/DL (ref 70–99)
HCT VFR BLD AUTO: 35.7 % (ref 35.8–46.3)
HGB BLD-MCNC: 11.4 G/DL (ref 11.7–15.4)
IMM GRANULOCYTES # BLD AUTO: 0.14 K/UL (ref 0–0.5)
IMM GRANULOCYTES NFR BLD AUTO: 1.7 % (ref 0–5)
INR PPP: 2
LYMPHOCYTES # BLD: 1.63 K/UL (ref 0.5–4.6)
LYMPHOCYTES NFR BLD: 19.4 % (ref 13–44)
MAGNESIUM SERPL-MCNC: 1.7 MG/DL (ref 1.8–2.4)
MCH RBC QN AUTO: 28.9 PG (ref 26.1–32.9)
MCHC RBC AUTO-ENTMCNC: 31.9 G/DL (ref 31.4–35)
MCV RBC AUTO: 90.4 FL (ref 82–102)
MONOCYTES # BLD: 1 K/UL (ref 0.1–1.3)
MONOCYTES NFR BLD: 11.9 % (ref 4–12)
NEUTS SEG # BLD: 5.47 K/UL (ref 1.7–8.2)
NEUTS SEG NFR BLD: 64.9 % (ref 43–78)
NRBC # BLD: 0 K/UL (ref 0–0.2)
PLATELET # BLD AUTO: 157 K/UL (ref 150–450)
PMV BLD AUTO: 10.3 FL (ref 9.4–12.3)
POTASSIUM SERPL-SCNC: 4.1 MMOL/L (ref 3.5–5.1)
PROTHROMBIN TIME: 23.6 SEC (ref 11.3–14.9)
RBC # BLD AUTO: 3.95 M/UL (ref 4.05–5.2)
SODIUM SERPL-SCNC: 137 MMOL/L (ref 136–145)
WBC # BLD AUTO: 8.4 K/UL (ref 4.3–11.1)

## 2025-02-14 PROCEDURE — 2500000003 HC RX 250 WO HCPCS: Performed by: STUDENT IN AN ORGANIZED HEALTH CARE EDUCATION/TRAINING PROGRAM

## 2025-02-14 PROCEDURE — 6370000000 HC RX 637 (ALT 250 FOR IP): Performed by: STUDENT IN AN ORGANIZED HEALTH CARE EDUCATION/TRAINING PROGRAM

## 2025-02-14 PROCEDURE — 85025 COMPLETE CBC W/AUTO DIFF WBC: CPT

## 2025-02-14 PROCEDURE — 97535 SELF CARE MNGMENT TRAINING: CPT

## 2025-02-14 PROCEDURE — 97530 THERAPEUTIC ACTIVITIES: CPT

## 2025-02-14 PROCEDURE — 80048 BASIC METABOLIC PNL TOTAL CA: CPT

## 2025-02-14 PROCEDURE — 1180000000 HC REHAB R&B

## 2025-02-14 PROCEDURE — 97110 THERAPEUTIC EXERCISES: CPT

## 2025-02-14 PROCEDURE — 99232 SBSQ HOSP IP/OBS MODERATE 35: CPT | Performed by: STUDENT IN AN ORGANIZED HEALTH CARE EDUCATION/TRAINING PROGRAM

## 2025-02-14 PROCEDURE — 85610 PROTHROMBIN TIME: CPT

## 2025-02-14 PROCEDURE — 36415 COLL VENOUS BLD VENIPUNCTURE: CPT

## 2025-02-14 PROCEDURE — 83735 ASSAY OF MAGNESIUM: CPT

## 2025-02-14 RX ORDER — WARFARIN SODIUM 5 MG/1
5 TABLET ORAL
Status: DISCONTINUED | OUTPATIENT
Start: 2025-02-14 | End: 2025-02-15

## 2025-02-14 RX ORDER — POLYETHYLENE GLYCOL 3350 17 G/17G
17 POWDER, FOR SOLUTION ORAL DAILY PRN
Status: DISCONTINUED | OUTPATIENT
Start: 2025-02-14 | End: 2025-02-21 | Stop reason: HOSPADM

## 2025-02-14 RX ORDER — WARFARIN SODIUM 5 MG/1
2.5 TABLET ORAL
Status: DISCONTINUED | OUTPATIENT
Start: 2025-02-15 | End: 2025-02-15

## 2025-02-14 RX ADMIN — SOTALOL HYDROCHLORIDE 120 MG: 80 TABLET ORAL at 08:51

## 2025-02-14 RX ADMIN — SOTALOL HYDROCHLORIDE 120 MG: 80 TABLET ORAL at 21:22

## 2025-02-14 RX ADMIN — ACETAMINOPHEN 1000 MG: 500 TABLET, FILM COATED ORAL at 21:22

## 2025-02-14 RX ADMIN — SODIUM CHLORIDE, PRESERVATIVE FREE 10 ML: 5 INJECTION INTRAVENOUS at 08:52

## 2025-02-14 RX ADMIN — ACETAMINOPHEN 1000 MG: 500 TABLET, FILM COATED ORAL at 08:50

## 2025-02-14 RX ADMIN — OXYCODONE 5 MG: 5 TABLET ORAL at 11:17

## 2025-02-14 RX ADMIN — ONDANSETRON 4 MG: 4 TABLET, ORALLY DISINTEGRATING ORAL at 03:50

## 2025-02-14 RX ADMIN — PANTOPRAZOLE SODIUM 40 MG: 40 TABLET, DELAYED RELEASE ORAL at 05:58

## 2025-02-14 RX ADMIN — LEVOTHYROXINE SODIUM 137 MCG: 0.05 TABLET ORAL at 05:57

## 2025-02-14 RX ADMIN — TRAZODONE HYDROCHLORIDE 50 MG: 50 TABLET ORAL at 21:22

## 2025-02-14 RX ADMIN — ACETAMINOPHEN 1000 MG: 500 TABLET, FILM COATED ORAL at 15:16

## 2025-02-14 RX ADMIN — Medication 1 CAPSULE: at 08:51

## 2025-02-14 RX ADMIN — OXYCODONE 5 MG: 5 TABLET ORAL at 21:22

## 2025-02-14 RX ADMIN — OXYCODONE 5 MG: 5 TABLET ORAL at 03:50

## 2025-02-14 RX ADMIN — METOPROLOL SUCCINATE 25 MG: 25 TABLET, EXTENDED RELEASE ORAL at 08:51

## 2025-02-14 RX ADMIN — WARFARIN SODIUM 5 MG: 5 TABLET ORAL at 18:27

## 2025-02-14 ASSESSMENT — PAIN DESCRIPTION - ORIENTATION
ORIENTATION: RIGHT

## 2025-02-14 ASSESSMENT — PAIN - FUNCTIONAL ASSESSMENT
PAIN_FUNCTIONAL_ASSESSMENT: ACTIVITIES ARE NOT PREVENTED

## 2025-02-14 ASSESSMENT — PAIN DESCRIPTION - LOCATION
LOCATION: HIP;LEG
LOCATION: LEG
LOCATION: HIP

## 2025-02-14 ASSESSMENT — PAIN SCALES - GENERAL
PAINLEVEL_OUTOF10: 4
PAINLEVEL_OUTOF10: 2
PAINLEVEL_OUTOF10: 6
PAINLEVEL_OUTOF10: 3

## 2025-02-14 ASSESSMENT — PAIN DESCRIPTION - DESCRIPTORS
DESCRIPTORS: ACHING
DESCRIPTORS: ACHING;DISCOMFORT;JABBING
DESCRIPTORS: ACHING;DISCOMFORT;JABBING
DESCRIPTORS: ACHING
DESCRIPTORS: ACHING;DISCOMFORT;JABBING

## 2025-02-14 NOTE — PROGRESS NOTES
PHYSICAL THERAPY DAILY NOTE    PT Individual Minutes  Time In: 1033  Time Out: 1115  Minutes: 42                 Total Treatment Time:  42 Minutes    Pt. Seen for: AM, Patient Education, Therapeutic Exercise, and Transfer Training     Subjective: Hurts in groin and upper hip         Objective:  Restrictions/Precautions: Fall Risk, Weight Bearing  Required Braces or Orthoses?: No   Right Lower Extremity Weight Bearing: Weight Bearing As Tolerated          Other Position/Activity Restrictions: WBAT for transfers only; ststic standing         GROSS ASSESSMENT           COGNITION Daily Assessment    Overall Orientation Status: Within Normal Limits  Orientation Level: Oriented X4   Overall Cognitive Status: WFL        BED/MAT MOBILITY Daily Assessment     Supine to Sit: Modified independent  Scooting: Supervision  Bed Mobility Comments: with HOB up        TRANSFERS Daily Assessment    Sit to Stand: Contact guard assistance  Stand to Sit: Contact guard assistance  Bed to Chair: Contact guard assistance  Stand Pivot Transfers: Contact guard assistance          GAIT Daily Assessment    Distance: No ambulation              STEPS/STAIRS Daily Assessment                   BALANCE Daily Assessment           WHEELCHAIR MOBILITY Daily Assessment                          LOWER EXTREMITY EXERCISES   SUPINE EXERCISES Sets Reps Comments   Ankle Pumps 1 20    Quad Sets 1 20    Glut Sets 1 20    Heel Slides 1 15 Active on the left  X10 Aassist on the right   Hip Abduction 1 15 AA on the right   Straight Leg Raise 1 10 AA assist on right          Pain level: 4  Pain Location:  right hip  Pain Interventions: has ice; meds early and scheduled for later.  Pt to ask around lunch.    Vital Signs:  /67   Pulse 75   Temp 98.4 °F (36.9 °C) (Oral)   Resp 18   Ht 1.651 m (5' 5\")   Wt 60.8 kg (134 lb)   SpO2 93%   BMI 22.30 kg/m²     Education:    Education Given To: Patient  Education Provided: Transfer Training;Mobility

## 2025-02-14 NOTE — PROGRESS NOTES
PHYSICAL THERAPY DAILY NOTE    PT Individual Minutes  Time In: 1301  Time Out: 1347  Minutes: 46                 Total Treatment Time:  46 Minutes    Pt. Seen for: PM, Therapeutic Exercise, and Transfer Training     Subjective: they gave me something to go    Objective:  Restrictions/Precautions: Fall Risk, Weight Bearing  Required Braces or Orthoses?: No   Right Lower Extremity Weight Bearing: Weight Bearing As Tolerated          Other Position/Activity Restrictions: WBAT for transfers only; ststic standing         GROSS ASSESSMENT           COGNITION Daily Assessment    Overall Orientation Status: Within Normal Limits  Orientation Level: Oriented X4   Overall Cognitive Status: WFL        BED/MAT MOBILITY Daily Assessment     Supine to Sit: Modified independent  Scooting: Supervision  Bed Mobility Comments: with HOB up        TRANSFERS Daily Assessment   Pt assisted to BR to urinate prior to PT and after PT for BM.   Sit to Stand: Contact guard assistance  Stand to Sit: Contact guard assistance  Bed to Chair: Contact guard assistance  Stand Pivot Transfers: Contact guard assistance          GAIT Daily Assessment    Distance: No ambulation              STEPS/STAIRS Daily Assessment                   BALANCE Daily Assessment           WHEELCHAIR MOBILITY Daily Assessment                          LOWER EXTREMITY EXERCISES   Gertrude med level 2 13 minutes     Pain level: 0  Pain Location:  hip  Pain Interventions: had had meds at lunch    Vital Signs:  BP (!) 109/56   Pulse 75   Temp 98.2 °F (36.8 °C) (Oral)   Resp 16   Ht 1.651 m (5' 5\")   Wt 60.8 kg (134 lb)   SpO2 95%   BMI 22.30 kg/m²       Education:    Education Given To: Patient  Education Provided: Transfer Training;Mobility Training;Precautions;Home Exercise Program     Interdisciplinary Communication: RN, OT    Pt. Left  bathroom with OT assisting.         Assessment: Slightly abbreviated therapy 2* to two toilet transfers but getting up and down and

## 2025-02-14 NOTE — PROGRESS NOTES
[facility] OCCUPATIONAL THERAPY DAILY NOTE  OT Individual Minutes  Time In: 1118  Time Out: 1159  Minutes: 41               Subjective: Pt agreeable to treatment. \"Alright\"  Pain:  Pt reporting soreness in RUE after UB ther-ex yesterday, no numerical value provided. Session/activities adjusted as needed to prevent exacerbation of soreness .  Interdisciplinary Communication: Collaborated with PT, RN, and SLP regarding pt status and pt performance.   Precautions: Falls, Posey Alarm, and WBAT RLE T/Fs only  Lines:N/A  O2 Device: RA  Vitals  BP: Seated after standing 105/57;  HR: 75bpm;      FUNCTIONAL MOBILITY:       Bed Mobility NT    Sit to Stand CGA    Transfer  CGA and Rebekah  Transfer Type: SPT  Equipment: RW    Ambulation NT  Equipment: NA       - Activity Tolerance - Balance - Coordination - Energy Conservation/Pacing  - Self-Care   Pt completed self-care challenge BUE function, functional mobility and (I) with ADLs in preparation for safe return to max (I) with ADLs. Pt tolerated self-care well with no c/o pain. Rest breaks utilized as needed throughout..  Pt completed:  Bathing; BUE and face washing seated S/U, LB dressing; Min A for completion of hiking LB clothing , and Functional t/f training; Min - CGA SPT with RW        - Activity Tolerance - Cognition - Coordination - Energy Conservation/Pacing  - Range of Motion - Strengthening   Pt completed therapeutic activities to challenge  BUE AROM/STR, B/L hand(s) dexterity, FM/GM coordination, bi-manual coordination, activity tolerance, and problem solving in preparation for safe return to max (I) with I/ADLs. Pt tolerated Pt tolerated activities well with no c/o pain. . Rest breaks utilized as needed throughout..   Pt completed peg and board activity seated at table. Pt used LUE to retrieve pegs from tray on L. Pt then used BUE to separate stacked [2] pegs. Once  pt used RUE to place pegs into green board placed R of pt as instructed via verbal and/or diagram  instructions. Board on 70deg incline for 1 pattern and table top for 2nd. Pt completed ~25 sets x 2 of varying difficulty. Pt completed 1 mid and high difficulty pattern(s) with significantly increased time and  3-5 drops noted. Pt required 1-3 verbal cues for directions and problem solving.     Education   Adaptive ADL Techniques, AE/DME Training, Benefits of OT, Energy Conservation, Pacing, Functional Transfer Training, [place] Dressing Strategies, Precautions, Rolling Walker Management, and Safety Awareness     Assessment: Patient tolerated session well. RN with pt on entry, OT assumed session. Pt demonstrated good participation in OT treatment. Pt continues to benefit from skilled OT services to address remaining deficits and progress toward baseline level of independence and safety. Patient ended session seated in recliner with call bell and needs within reach, with chair/bed alarm activated, with legs elevated, and lunch tray set up .   Plan: Continue OT POC.     NIYA FOLEY OT   2/14/2025

## 2025-02-14 NOTE — PROGRESS NOTES
[facility] OCCUPATIONAL THERAPY DAILY NOTE  OT Individual Minutes  Time In: 1348  Time Out: 1437  Minutes: 49               Subjective: Pt agreeable to treatment. \"I'm not as good with puzzles as I was I guess\"  Pain: No pain expressed.  Interdisciplinary Communication: Collaborated with PT and RN regarding pt status, pt performance, and handoff communication.   Precautions: Falls, [place] Alarm, and WBAT RLE T/Fs only  Lines:N/A  O2 Device: RA    FUNCTIONAL MOBILITY:       Bed Mobility NT    Sit to Stand CGA    Transfer  Rebekah  Transfer Type: SPT  Equipment: RW and HHA    Ambulation NT  Equipment: NA       - Activity Tolerance - Balance - Coordination - Self-Care   Pt completed self-care seated and standing at commode to challenge B2UE function, functional mobility and (I) with ADLs in preparation for safe return to max (I) with ADLs. Pt tolerated self-care well with no c/o pain. Rest breaks utilized as needed throughout..  Pt completed:  Toileting; CGA void and BM attempt seated and standing at commode with RW and grab bars, pt S/U for hand hygiene. Handoff from PT->OT pt on commode.        - Activity Tolerance - Cognition - Coordination - Energy Conservation/Pacing  - Range of Motion - Strengthening   Pt completed therapeutic activities to challenge  BUE AROM/STR, B/L hand(s) dexterity, FM/GM coordination, bi-manual coordination, activity tolerance, complex problem solving, and STM recall in preparation for safe return to max (I) with I/ADLs. Pt tolerated Pt tolerated activities well with no c/o pain. . Rest breaks utilized as needed throughout..   Pt completed puzzle activity seated at table. Pt used B/L hand(s) to retrieve pieces from L, midline and R. Pt assembled at midline as show in diagram provided. Pt completed 25% high difficulty, mid size, 50 piece, puzzle(s) with irregular border line. Pt required significantly increased time with 3-5 verbal cues for directions, error recognition and problem solving.

## 2025-02-14 NOTE — PROGRESS NOTES
[place] [address]  Tel: [phone]     Physical Medicine and Rehabilitation Progress Note      Nancy Omalley  Admit Date: 2/10/2025  Admit Diagnosis: Mild traumatic brain injury, without loss of consciousness, sequela [S06.9X0S]    Subjective     Patient seen this morning during break in therapy. Bowels finally moving: BM this AM, BM x 2 overnight. Patient refused MiraLAX this AM due to concerns of aggravating chronic diarrhea. Patient reports ice application to R LE helps pain.  Blood pressure stable off of losartan.  All questions and concerns were addressed at this time.    Objective:     Current Facility-Administered Medications   Medication Dose Route Frequency    warfarin (COUMADIN) tablet 5 mg  5 mg Oral Every Friday    And    [START ON 2/15/2025] warfarin (COUMADIN) tablet 2.5 mg  2.5 mg Oral Once per day on Sunday Monday Tuesday Wednesday Thursday Saturday    polyethylene glycol (GLYCOLAX) packet 17 g  17 g Oral Daily    sennosides-docusate sodium (SENOKOT-S) 8.6-50 MG tablet 2 tablet  2 tablet Oral QHS    lactobacillus (CULTURELLE) capsule 1 capsule  1 capsule Oral Daily with breakfast    levothyroxine (SYNTHROID) tablet 137 mcg  137 mcg Oral QAM AC    pantoprazole (PROTONIX) tablet 40 mg  40 mg Oral QAM AC    acetaminophen (TYLENOL) tablet 1,000 mg  1,000 mg Oral TID    [Held by provider] losartan (COZAAR) tablet 50 mg  50 mg Oral Daily    metoprolol succinate (TOPROL XL) extended release tablet 25 mg  25 mg Oral Daily    sotalol (BETAPACE) tablet 120 mg  120 mg Oral Q12H    traZODone (DESYREL) tablet 50 mg  50 mg Oral Nightly PRN    sodium phosphate (FLEET) rectal enema 1 enema  1 enema Rectal Daily PRN    bisacodyl (DULCOLAX) suppository 10 mg  10 mg Rectal Daily PRN    calcium carbonate (TUMS) chewable tablet 500 mg  500 mg Oral TID PRN    hydrALAZINE (APRESOLINE) tablet 10 mg  10 mg Oral TID PRN    ondansetron (ZOFRAN-ODT)  minimizing noise.   Keep lights on and blinds open during the day; lights and TV off at night; minimize tethers including urinary catheters; use glasses, hearing aids, and dentures as appropriate.  Avoid long periods of sleep during the day. Brief naps can be helpful but long periods of sleep can disrupt the sleep wake cycle for this patient.  Reorientation and visual cues for orientation should be tried  Maintain hydration and bowel function        Disposition: 2/24.  Home with home health care nurse PT OT and aide        Outpatient Provider Follow-up Appointments:  No follow-up provider specified.       The patient is able to tolerate and benefit from multidisciplinary acute inpatient rehabilitation.    Greater than 35 minutes were spent in direct patient care, discussing current medications, medical conditions, dispo planning, and plans for continuation of therapy in the community. More than 50% of total time was spent in counseling and coordination of care with patient, nursing staff, therapy team and case management.    Part of this note may have been written by using a voice dictation software.  The note has been proof read but may still contain some grammatical/other typographical errors.      Jennifer Gonzalez D.O. PM&R  Inpatient Rehabilitation Medical Director  February 14, 2025

## 2025-02-14 NOTE — PROGRESS NOTES
Warfarin dosing per pharmacist    Nancy Omalley is a 85 y.o. female.    Height: 165.1 cm (5' 5\")  Weight - Scale: 60.8 kg (134 lb)    Indication:  Afib    Goal INR:  2-3    Home dose:  5 mg on Fridays and 2.5 mg all remaining days    Risk factors or significant drug interactions:  post-op hip fracture due to fall    Other anticoagulants:  none    Lab Results   Component Value Date/Time    HGB 11.4 02/14/2025 05:17 AM    HGB 12.4 02/11/2025 04:58 AM    HGB 12.7 02/10/2025 05:06 AM     Lab Results   Component Value Date/Time    INR 2.0 02/14/2025 05:17 AM    INR 2.6 04/25/2022 11:35 AM        Daily Monitoring  Date  INR     Warfarin dose Notes  2/7  1.6  Held Vit K IV 10 mg  2/8  1.5  5 mg   2/9  1.2  5 mg   2/10  1.2  5 mg  2/11  1.4  2.5 mg  2/12  2.1  1 mg  2/13  2.1  2.5 mg  2/14  2  5 mg      Pharmacy was consulted to assist with warfarin dosing for Ms. Omalley during this admission. Her home regimen, indication, and INR goal were confirmed via reviewing an outpatient anticoagulation note. Of note, INR was subtherapeutic at her last appointment on 1.7 and again on admission at 1.6. Vitamin K 10 mg was administered overnight on 2/7 pre-operatively. Warfarin was resumed by ortho surgery on 2/8 POD0.    INR therapeutic at 2 today. Will give usual Friday dose of 5 mg tonight and resume PTA regimen.    Pharmacy will continue to follow. Please call with any questions.    Thank you,  Rima Pappas, [facility]

## 2025-02-15 LAB
INR PPP: 1.8
PROTHROMBIN TIME: 22.4 SEC (ref 11.3–14.9)

## 2025-02-15 PROCEDURE — 6370000000 HC RX 637 (ALT 250 FOR IP): Performed by: STUDENT IN AN ORGANIZED HEALTH CARE EDUCATION/TRAINING PROGRAM

## 2025-02-15 PROCEDURE — 97110 THERAPEUTIC EXERCISES: CPT

## 2025-02-15 PROCEDURE — 1180000000 HC REHAB R&B

## 2025-02-15 PROCEDURE — 85610 PROTHROMBIN TIME: CPT

## 2025-02-15 PROCEDURE — 97535 SELF CARE MNGMENT TRAINING: CPT

## 2025-02-15 PROCEDURE — 97530 THERAPEUTIC ACTIVITIES: CPT

## 2025-02-15 PROCEDURE — 2500000003 HC RX 250 WO HCPCS: Performed by: STUDENT IN AN ORGANIZED HEALTH CARE EDUCATION/TRAINING PROGRAM

## 2025-02-15 PROCEDURE — 36415 COLL VENOUS BLD VENIPUNCTURE: CPT

## 2025-02-15 RX ORDER — WARFARIN SODIUM 5 MG/1
5 TABLET ORAL
Status: COMPLETED | OUTPATIENT
Start: 2025-02-15 | End: 2025-02-15

## 2025-02-15 RX ORDER — WARFARIN SODIUM 5 MG/1
5 TABLET ORAL
Status: DISCONTINUED | OUTPATIENT
Start: 2025-02-21 | End: 2025-02-17

## 2025-02-15 RX ORDER — WARFARIN SODIUM 5 MG/1
2.5 TABLET ORAL
Status: DISCONTINUED | OUTPATIENT
Start: 2025-02-16 | End: 2025-02-17

## 2025-02-15 RX ADMIN — ACETAMINOPHEN 1000 MG: 500 TABLET, FILM COATED ORAL at 15:42

## 2025-02-15 RX ADMIN — WARFARIN SODIUM 5 MG: 5 TABLET ORAL at 17:22

## 2025-02-15 RX ADMIN — OXYCODONE 5 MG: 5 TABLET ORAL at 20:31

## 2025-02-15 RX ADMIN — TRAZODONE HYDROCHLORIDE 50 MG: 50 TABLET ORAL at 20:30

## 2025-02-15 RX ADMIN — OXYCODONE 5 MG: 5 TABLET ORAL at 06:25

## 2025-02-15 RX ADMIN — SOTALOL HYDROCHLORIDE 120 MG: 80 TABLET ORAL at 20:30

## 2025-02-15 RX ADMIN — OXYCODONE 5 MG: 5 TABLET ORAL at 12:40

## 2025-02-15 RX ADMIN — LEVOTHYROXINE SODIUM 137 MCG: 0.05 TABLET ORAL at 06:25

## 2025-02-15 RX ADMIN — ONDANSETRON 4 MG: 4 TABLET, ORALLY DISINTEGRATING ORAL at 09:49

## 2025-02-15 RX ADMIN — Medication 1 CAPSULE: at 09:42

## 2025-02-15 RX ADMIN — ACETAMINOPHEN 1000 MG: 500 TABLET, FILM COATED ORAL at 09:41

## 2025-02-15 RX ADMIN — PANTOPRAZOLE SODIUM 40 MG: 40 TABLET, DELAYED RELEASE ORAL at 06:25

## 2025-02-15 RX ADMIN — SODIUM CHLORIDE, PRESERVATIVE FREE 10 ML: 5 INJECTION INTRAVENOUS at 06:28

## 2025-02-15 RX ADMIN — ACETAMINOPHEN 1000 MG: 500 TABLET, FILM COATED ORAL at 20:30

## 2025-02-15 RX ADMIN — SOTALOL HYDROCHLORIDE 120 MG: 80 TABLET ORAL at 09:42

## 2025-02-15 ASSESSMENT — PAIN DESCRIPTION - DESCRIPTORS
DESCRIPTORS: ACHING

## 2025-02-15 ASSESSMENT — PAIN SCALES - GENERAL
PAINLEVEL_OUTOF10: 7
PAINLEVEL_OUTOF10: 0
PAINLEVEL_OUTOF10: 5
PAINLEVEL_OUTOF10: 3
PAINLEVEL_OUTOF10: 5

## 2025-02-15 ASSESSMENT — PAIN DESCRIPTION - ORIENTATION
ORIENTATION: RIGHT

## 2025-02-15 ASSESSMENT — PAIN DESCRIPTION - LOCATION
LOCATION: HIP
LOCATION: INCISION;HIP
LOCATION: HIP

## 2025-02-15 NOTE — PLAN OF CARE
Problem: Chronic Conditions and Co-morbidities  Goal: Patient's chronic conditions and co-morbidity symptoms are monitored and maintained or improved  Outcome: Progressing     Problem: Skin/Tissue Integrity  Goal: Skin integrity remains intact  Description: 1.  Monitor for areas of redness and/or skin breakdown  2.  Assess vascular access sites hourly  3.  Every 4-6 hours minimum:  Change oxygen saturation probe site  4.  Every 4-6 hours:  If on nasal continuous positive airway pressure, respiratory therapy assess nares and determine need for appliance change or resting period  2/15/2025 1005 by Reyna Pappas, RN  Outcome: Progressing  2/14/2025 2009 by Minda Echeverria, RN  Outcome: Progressing  Flowsheets (Taken 2/14/2025 0725 by Tam Sarkar, RN)  Skin Integrity Remains Intact: Monitor for areas of redness and/or skin breakdown     Problem: Safety - Adult  Goal: Free from fall injury  2/15/2025 1005 by Reyna Pappas, RN  Outcome: Progressing  2/14/2025 2009 by Minda Echeverria RN  Outcome: Progressing     Problem: ABCDS Injury Assessment  Goal: Absence of physical injury  Outcome: Progressing     Problem: Pain  Goal: Verbalizes/displays adequate comfort level or baseline comfort level  2/15/2025 1005 by Reyna Pappas, RN  Outcome: Progressing  2/14/2025 2009 by Minda Echeverria, RN  Outcome: Progressing

## 2025-02-15 NOTE — PROGRESS NOTES
Patient states that she reached out to Cardiology through my chart concerning her 2 beta blockers that are ordered, could it be contributing to nausea?. Patient states that Dr. Sharpe says that she could stop Metoprolol. Patient refused Metoprolol this am. Nurse did see note in my chart on patient.

## 2025-02-15 NOTE — PLAN OF CARE
Problem: Skin/Tissue Integrity  Goal: Skin integrity remains intact  Description: 1.  Monitor for areas of redness and/or skin breakdown  2.  Assess vascular access sites hourly  3.  Every 4-6 hours minimum:  Change oxygen saturation probe site  4.  Every 4-6 hours:  If on nasal continuous positive airway pressure, respiratory therapy assess nares and determine need for appliance change or resting period  Outcome: Progressing  Flowsheets (Taken 2/14/2025 5225 by Tam Sarkar RN)  Skin Integrity Remains Intact: Monitor for areas of redness and/or skin breakdown     Problem: Safety - Adult  Goal: Free from fall injury  Outcome: Progressing     Problem: Pain  Goal: Verbalizes/displays adequate comfort level or baseline comfort level  Outcome: Progressing

## 2025-02-15 NOTE — PROGRESS NOTES
PHYSICAL THERAPY DAILY NOTE          PT Concurrent Minutes  Time In: 1110  Time Out: 1150  Minutes: 40           Total Treatment Time:  40 Minutes    Pt. Seen for: AM, Patient Education, Therapeutic Exercise, and Transfer Training     Subjective: Patient agreeable to treatment today. States having pain to the right hip area. Rates pain at 1/10 at rest and 3/10 with activities. Last treatment was good. No new complaints at this time.          Objective:  Restrictions/Precautions: Fall Risk, Weight Bearing  Required Braces or Orthoses?: No   Right Lower Extremity Weight Bearing: Weight Bearing As Tolerated          Other Position/Activity Restrictions: WBAT for transfers only; static standing         GROSS ASSESSMENT   NT        COGNITION Daily Assessment    Overall Orientation Status: Within Normal Limits  Orientation Level: Oriented X4   Overall Cognitive Status: WNL        BED/MAT MOBILITY Daily Assessment     Bed Mobility Comments: Patient up sitting in WC following OT treatment        TRANSFERS Daily Assessment   Cues to remin weight bearing Sit to Stand: Contact guard assistance  Stand to Sit: Contact guard assistance  Stand Pivot Transfers: Contact guard assistance          GAIT Daily Assessment   NA Distance: No ambulation  More Ambulation?: No              STEPS/STAIRS Daily Assessment   NA Stairs?: No              BALANCE Daily Assessment    Static Sitting: Normal:  Pt. able to maintain balance w/o UE support  Dynamic Sitting: Normal - accepts maximal challenge & can shift weight easily fully in all directions  Static Standing: NT  Dynamic Standing: NT       WHEELCHAIR MOBILITY Daily Assessment   NT Wheelchair Type: Standard  Wheelchair Parts Management: Yes  Left Brakes Level of Assistance: Supervision  Propulsion: No                     LOWER EXTREMITY EXERCISES   Patient performed seated lower extremity strengthening exercises: heel raises with manual resistance, LAQ, hip abd/add with red elastic band,  manual resistance hamstring curls calf stretches. 1 set x 15 reps.  Motomed X 12 minutes level 3-4.       Pt. Left in wheelchair call bell in reach needs in reach bed/chair alarm activated         Assessment: Patient up sitting in WC following OT treatment session. Patient education during treatment session consisted of explanations and demonstrations. The patient required min assistance to complete sit to stand transfer. Minor verbal cues were required to remind the patient proper weight bearing for transfers and sequencing. Gait and standing limited by weight bearing.  Patient displays decrease safety awareness during functional transfers and gait.  Patient presents with decrease LE strength, decrease coordination, decrease balance, and endurance which limits the patient’s independence and safety with functional bed mobility, transfers, and gait.          Plan of Care: The patient has shown the ability to tolerate and benefit from physical therapy daily in a comprehensive inpatient rehabilitation program.  Continue intensive Physical Therapy  to address bed mobility, transfers, ambulation, strengthening, balance, and endurance. Continue with plan of care and focus on goals.     Abe Azul, PTA  2/15/2025

## 2025-02-15 NOTE — PROGRESS NOTES
Warfarin dosing per pharmacist    Nancy Omalley is a 85 y.o. female.    Height: 165.1 cm (5' 5\")  Weight - Scale: 60.8 kg (134 lb)    Indication:  Afib    Goal INR:  2-3    Home dose:  5 mg on Fridays and 2.5 mg all remaining days    Risk factors or significant drug interactions:  post-op hip fracture due to fall    Other anticoagulants:  none    Lab Results   Component Value Date/Time    HGB 11.4 02/14/2025 05:17 AM    HGB 12.4 02/11/2025 04:58 AM    HGB 12.7 02/10/2025 05:06 AM     Lab Results   Component Value Date/Time    INR 1.8 02/15/2025 06:38 AM    INR 2.6 04/25/2022 11:35 AM        Daily Monitoring  Date  INR     Warfarin dose Notes  2/7  1.6  Held Vit K IV 10 mg  2/8  1.5  5 mg   2/9  1.2  5 mg   2/10  1.2  5 mg  2/11  1.4  2.5 mg  2/12  2.1  1 mg  2/13  2.1  2.5 mg  2/14  2  5 mg  2/15  1.8  5 mg      Pharmacy was consulted to assist with warfarin dosing for Ms. Omalley during this admission. Her home regimen, indication, and INR goal were confirmed via reviewing an outpatient anticoagulation note. Of note, INR was subtherapeutic at her last appointment on 1.7 and again on admission at 1.6. Vitamin K 10 mg was administered overnight on 2/7 pre-operatively. Warfarin was resumed by ortho surgery on 2/8 POD0.    INR dropped to 1.8. Will give extra 5 mg dose tonight. Daily INR.    Pharmacy will continue to follow. Please call with any questions.    Thank you,  Rima Pappas [facility]

## 2025-02-15 NOTE — PROGRESS NOTES
[facility] OCCUPATIONAL THERAPY DAILY NOTE  OT Individual Minutes  Time In: 0930  Time Out: 1010  Minutes: 40               Subjective: Pt agreeable to treatment. \"It's nice to be able to take my time with this [bathing]\"  Pain: No pain expressed.  Interdisciplinary Communication: Collaborated with PT and RN regarding pt status, pt performance, and handoff communication.   Precautions: Falls, Posey Alarm, and WBAT RLE t/fs only  Lines:N/A  O2 Device: RA      FUNCTIONAL MOBILITY:       Bed Mobility SBA and CGA    Sit to Stand CGA    Transfer  CGA and Rebekah  Transfer Type: SPT  Equipment: RW    Ambulation NT  Equipment: NA      ACTIVITIES OF DAILY LIVING:       Eating       Oral Hygiene Setup or clean-up assistance S/U - Mod I seated at sink with incr time   Shower/Bathe Partial/moderate assistance SBA/Min A UB/LB bathing seated and standing at EOB with RW and LHS, incr time, assist for R foot only, improved carry-ove rof LHS edu   Upper Body  Dressing Setup or clean-up assistance S/U doff gown, don bra, under shirt and shirt seated EOB   Lower Body Dressing Partial/moderate assistance Min A - CGA doff/don underwear and pants seated and standing with RW and reacher, incr time, review of AE/AD use   Donning/[place] Footwear Partial/moderate assistance Min A - CGA, CGA doff/don socks and shoes with sock aid and long handled shoe horn, assist for tying shoes   Toileting Hygiene       Toilet Transfer       Education Adaptive ADL Techniques, AE/DME Training, Benefits of OT, Energy Conservation, Pacing, Functional Transfer Training, Shemar Dressing Strategies, Precautions, Rolling Walker Management, and Safety Awareness     Assessment: Patient tolerated session well. Pt demonstrated good participation in OT treatment. Pt continues to benefit from skilled OT services to address remaining deficits and progress toward baseline level of independence and safety. Patient ended session seated in gym with PT.   Plan: Continue OT POC.

## 2025-02-16 VITALS
HEART RATE: 76 BPM | TEMPERATURE: 98.6 F | OXYGEN SATURATION: 97 % | HEIGHT: 65 IN | DIASTOLIC BLOOD PRESSURE: 53 MMHG | BODY MASS INDEX: 22.33 KG/M2 | WEIGHT: 134 LBS | RESPIRATION RATE: 18 BRPM | SYSTOLIC BLOOD PRESSURE: 118 MMHG

## 2025-02-16 LAB
INR PPP: 2.4
PROTHROMBIN TIME: 27.7 SEC (ref 11.3–14.9)

## 2025-02-16 PROCEDURE — 1180000000 HC REHAB R&B

## 2025-02-16 PROCEDURE — 36415 COLL VENOUS BLD VENIPUNCTURE: CPT

## 2025-02-16 PROCEDURE — 85610 PROTHROMBIN TIME: CPT

## 2025-02-16 PROCEDURE — 6370000000 HC RX 637 (ALT 250 FOR IP): Performed by: STUDENT IN AN ORGANIZED HEALTH CARE EDUCATION/TRAINING PROGRAM

## 2025-02-16 RX ADMIN — OXYCODONE 5 MG: 5 TABLET ORAL at 04:00

## 2025-02-16 RX ADMIN — Medication 1 CAPSULE: at 08:12

## 2025-02-16 RX ADMIN — SOTALOL HYDROCHLORIDE 120 MG: 80 TABLET ORAL at 20:34

## 2025-02-16 RX ADMIN — ACETAMINOPHEN 1000 MG: 500 TABLET, FILM COATED ORAL at 14:55

## 2025-02-16 RX ADMIN — SOTALOL HYDROCHLORIDE 120 MG: 80 TABLET ORAL at 08:13

## 2025-02-16 RX ADMIN — TRAZODONE HYDROCHLORIDE 50 MG: 50 TABLET ORAL at 20:34

## 2025-02-16 RX ADMIN — OXYCODONE 5 MG: 5 TABLET ORAL at 13:58

## 2025-02-16 RX ADMIN — ACETAMINOPHEN 1000 MG: 500 TABLET, FILM COATED ORAL at 08:12

## 2025-02-16 RX ADMIN — WARFARIN SODIUM 2.5 MG: 5 TABLET ORAL at 18:00

## 2025-02-16 RX ADMIN — OXYCODONE 5 MG: 5 TABLET ORAL at 20:35

## 2025-02-16 RX ADMIN — PANTOPRAZOLE SODIUM 40 MG: 40 TABLET, DELAYED RELEASE ORAL at 03:59

## 2025-02-16 RX ADMIN — ACETAMINOPHEN 1000 MG: 500 TABLET, FILM COATED ORAL at 20:34

## 2025-02-16 RX ADMIN — LEVOTHYROXINE SODIUM 137 MCG: 0.05 TABLET ORAL at 04:02

## 2025-02-16 ASSESSMENT — PAIN SCALES - GENERAL
PAINLEVEL_OUTOF10: 4
PAINLEVEL_OUTOF10: 7
PAINLEVEL_OUTOF10: 3
PAINLEVEL_OUTOF10: 0
PAINLEVEL_OUTOF10: 3
PAINLEVEL_OUTOF10: 0
PAINLEVEL_OUTOF10: 7
PAINLEVEL_OUTOF10: 7

## 2025-02-16 ASSESSMENT — PAIN DESCRIPTION - LOCATION
LOCATION: HIP;KNEE
LOCATION: HIP
LOCATION: HIP
LOCATION: INCISION;HIP
LOCATION: HIP;KNEE

## 2025-02-16 ASSESSMENT — PAIN DESCRIPTION - ORIENTATION
ORIENTATION: RIGHT

## 2025-02-16 ASSESSMENT — PAIN DESCRIPTION - DESCRIPTORS
DESCRIPTORS: ACHING
DESCRIPTORS: ACHING;DISCOMFORT;JABBING
DESCRIPTORS: ACHING
DESCRIPTORS: ACHING;DULL;PRESSURE

## 2025-02-16 ASSESSMENT — PAIN - FUNCTIONAL ASSESSMENT
PAIN_FUNCTIONAL_ASSESSMENT: ACTIVITIES ARE NOT PREVENTED

## 2025-02-16 NOTE — PROGRESS NOTES
Warfarin dosing per pharmacist    Nancy Omalley is a 85 y.o. female.    Height: 165.1 cm (5' 5\")  Weight - Scale: 60.8 kg (134 lb)    Indication:  Afib    Goal INR:  2-3    Home dose:  5 mg on Fridays and 2.5 mg all remaining days    Risk factors or significant drug interactions:  post-op hip fracture due to fall    Other anticoagulants:  none    Lab Results   Component Value Date/Time    HGB 11.4 02/14/2025 05:17 AM    HGB 12.4 02/11/2025 04:58 AM    HGB 12.7 02/10/2025 05:06 AM     Lab Results   Component Value Date/Time    INR 2.4 02/16/2025 06:16 AM    INR 2.6 04/25/2022 11:35 AM        Daily Monitoring  Date  INR     Warfarin dose Notes  2/7  1.6  Held Vit K IV 10 mg  2/8  1.5  5 mg   2/9  1.2  5 mg   2/10  1.2  5 mg  2/11  1.4  2.5 mg  2/12  2.1  1 mg  2/13  2.1  2.5 mg  2/14  2  5 mg  2/15  1.8  5 mg  2/16  2.4  2.5 mg      Pharmacy was consulted to assist with warfarin dosing for Ms. Omalley during this admission. Her home regimen, indication, and INR goal were confirmed via reviewing an outpatient anticoagulation note. Of note, INR was subtherapeutic at her last appointment on 1.7 and again on admission at 1.6. Vitamin K 10 mg was administered overnight on 2/7 pre-operatively. Warfarin was resumed by ortho surgery on 2/8 POD0.    INR jumped to 2.4. Give warfarin 2.5 mg tonight. Daily INR.    Pharmacy will continue to follow. Please call with any questions.    Thank you,  Rima Pappas, [facility]

## 2025-02-16 NOTE — PLAN OF CARE
Problem: Chronic Conditions and Co-morbidities  Goal: Patient's chronic conditions and co-morbidity symptoms are monitored and maintained or improved  2/15/2025 1005 by Reyna Pappas, RN  Outcome: Progressing     Problem: Skin/Tissue Integrity  Goal: Skin integrity remains intact  Description: 1.  Monitor for areas of redness and/or skin breakdown  2.  Assess vascular access sites hourly  3.  Every 4-6 hours minimum:  Change oxygen saturation probe site  4.  Every 4-6 hours:  If on nasal continuous positive airway pressure, respiratory therapy assess nares and determine need for appliance change or resting period  2/15/2025 2145 by Minda Echeverria, RN  Outcome: Progressing  2/15/2025 1005 by Reyna Pappas, RN  Outcome: Progressing     Problem: Safety - Adult  Goal: Free from fall injury  2/15/2025 2145 by Minda Echeverria, RN  Outcome: Progressing  2/15/2025 1005 by Reyna Pappas, RN  Outcome: Progressing     Problem: ABCDS Injury Assessment  Goal: Absence of physical injury  2/15/2025 1005 by Reyna Pappas, RN  Outcome: Progressing     Problem: Pain  Goal: Verbalizes/displays adequate comfort level or baseline comfort level  2/15/2025 2145 by Minda Echeverria, RN  Outcome: Progressing  2/15/2025 1005 by Reyna Pappas, RN  Outcome: Progressing

## 2025-02-17 LAB
INR PPP: 2.9
PROTHROMBIN TIME: 32.2 SEC (ref 11.3–14.9)

## 2025-02-17 PROCEDURE — 99232 SBSQ HOSP IP/OBS MODERATE 35: CPT | Performed by: STUDENT IN AN ORGANIZED HEALTH CARE EDUCATION/TRAINING PROGRAM

## 2025-02-17 PROCEDURE — 85610 PROTHROMBIN TIME: CPT

## 2025-02-17 PROCEDURE — 1180000000 HC REHAB R&B

## 2025-02-17 PROCEDURE — 6370000000 HC RX 637 (ALT 250 FOR IP): Performed by: STUDENT IN AN ORGANIZED HEALTH CARE EDUCATION/TRAINING PROGRAM

## 2025-02-17 PROCEDURE — 6370000000 HC RX 637 (ALT 250 FOR IP): Performed by: PHYSICIAN ASSISTANT

## 2025-02-17 PROCEDURE — 97110 THERAPEUTIC EXERCISES: CPT

## 2025-02-17 PROCEDURE — 97530 THERAPEUTIC ACTIVITIES: CPT

## 2025-02-17 PROCEDURE — 97535 SELF CARE MNGMENT TRAINING: CPT

## 2025-02-17 PROCEDURE — 36415 COLL VENOUS BLD VENIPUNCTURE: CPT

## 2025-02-17 RX ORDER — OXYCODONE HYDROCHLORIDE 5 MG/1
5 TABLET ORAL EVERY 4 HOURS PRN
Qty: 9 TABLET | Refills: 0 | Status: SHIPPED | OUTPATIENT
Start: 2025-02-17 | End: 2025-02-18

## 2025-02-17 RX ADMIN — OXYCODONE 5 MG: 5 TABLET ORAL at 01:37

## 2025-02-17 RX ADMIN — Medication 1 CAPSULE: at 08:32

## 2025-02-17 RX ADMIN — OXYCODONE 5 MG: 5 TABLET ORAL at 21:23

## 2025-02-17 RX ADMIN — ACETAMINOPHEN 1000 MG: 500 TABLET, FILM COATED ORAL at 23:11

## 2025-02-17 RX ADMIN — SOTALOL HYDROCHLORIDE 120 MG: 80 TABLET ORAL at 08:32

## 2025-02-17 RX ADMIN — ACETAMINOPHEN 1000 MG: 500 TABLET, FILM COATED ORAL at 14:45

## 2025-02-17 RX ADMIN — SOTALOL HYDROCHLORIDE 120 MG: 80 TABLET ORAL at 21:22

## 2025-02-17 RX ADMIN — ACETAMINOPHEN 1000 MG: 500 TABLET, FILM COATED ORAL at 08:31

## 2025-02-17 RX ADMIN — TRAZODONE HYDROCHLORIDE 50 MG: 50 TABLET ORAL at 21:23

## 2025-02-17 RX ADMIN — PANTOPRAZOLE SODIUM 40 MG: 40 TABLET, DELAYED RELEASE ORAL at 05:47

## 2025-02-17 RX ADMIN — DOCUSATE SODIUM 50 MG AND SENNOSIDES 8.6 MG 2 TABLET: 8.6; 5 TABLET, FILM COATED ORAL at 21:23

## 2025-02-17 RX ADMIN — OXYCODONE 5 MG: 5 TABLET ORAL at 10:54

## 2025-02-17 RX ADMIN — LEVOTHYROXINE SODIUM 137 MCG: 0.05 TABLET ORAL at 05:47

## 2025-02-17 ASSESSMENT — PAIN DESCRIPTION - ORIENTATION
ORIENTATION: RIGHT

## 2025-02-17 ASSESSMENT — PAIN SCALES - GENERAL
PAINLEVEL_OUTOF10: 0
PAINLEVEL_OUTOF10: 3
PAINLEVEL_OUTOF10: 0
PAINLEVEL_OUTOF10: 2
PAINLEVEL_OUTOF10: 2
PAINLEVEL_OUTOF10: 7
PAINLEVEL_OUTOF10: 7
PAINLEVEL_OUTOF10: 2
PAINLEVEL_OUTOF10: 7
PAINLEVEL_OUTOF10: 7

## 2025-02-17 ASSESSMENT — PAIN SCALES - WONG BAKER: WONGBAKER_NUMERICALRESPONSE: HURTS A LITTLE BIT

## 2025-02-17 ASSESSMENT — PAIN DESCRIPTION - LOCATION
LOCATION: HIP
LOCATION: HIP
LOCATION: INCISION;HIP

## 2025-02-17 ASSESSMENT — PAIN - FUNCTIONAL ASSESSMENT: PAIN_FUNCTIONAL_ASSESSMENT: ACTIVITIES ARE NOT PREVENTED

## 2025-02-17 ASSESSMENT — PAIN DESCRIPTION - DESCRIPTORS
DESCRIPTORS: ACHING

## 2025-02-17 NOTE — PLAN OF CARE
Problem: Chronic Conditions and Co-morbidities  Goal: Patient's chronic conditions and co-morbidity symptoms are monitored and maintained or improved  2/17/2025 0753 by Reyna Pappas RN  Outcome: Progressing  2/16/2025 2155 by Aura Russ RN  Outcome: Progressing  Flowsheets (Taken 2/16/2025 1935)  Care Plan - Patient's Chronic Conditions and Co-Morbidity Symptoms are Monitored and Maintained or Improved: Monitor and assess patient's chronic conditions and comorbid symptoms for stability, deterioration, or improvement     Problem: Skin/Tissue Integrity  Goal: Skin integrity remains intact  Description: 1.  Monitor for areas of redness and/or skin breakdown  2.  Assess vascular access sites hourly  3.  Every 4-6 hours minimum:  Change oxygen saturation probe site  4.  Every 4-6 hours:  If on nasal continuous positive airway pressure, respiratory therapy assess nares and determine need for appliance change or resting period  2/17/2025 0753 by Renya Pappas, RN  Outcome: Progressing  2/16/2025 2155 by Aura Russ RN  Outcome: Progressing  Flowsheets (Taken 2/16/2025 1935)  Skin Integrity Remains Intact: Monitor for areas of redness and/or skin breakdown     Problem: Safety - Adult  Goal: Free from fall injury  2/17/2025 0753 by Reyna Pappas RN  Outcome: Progressing  2/16/2025 2155 by Aura Russ RN  Outcome: Progressing     Problem: ABCDS Injury Assessment  Goal: Absence of physical injury  2/17/2025 0753 by Reyna Pappas RN  Outcome: Progressing  2/16/2025 2155 by Aura Russ RN  Outcome: Progressing     Problem: Pain  Goal: Verbalizes/displays adequate comfort level or baseline comfort level  2/17/2025 0753 by Reyna Pappas RN  Outcome: Progressing  2/16/2025 2155 by Aura Russ RN  Outcome: Progressing

## 2025-02-17 NOTE — PROGRESS NOTES
PHYSICAL THERAPY DAILY NOTE    PT Individual Minutes  Time In: 0915  Time Out: 1010  Minutes: 55                 Total Treatment Time:  55 Minutes    Pt. Seen for: AM, Therapeutic Exercise, and Transfer Training     Subjective: Pt. Reports her bed @ home is too tall to get in without a stepstool.           Objective:  Restrictions/Precautions: Fall Risk, Weight Bearing  Required Braces or Orthoses?: No   Right Lower Extremity Weight Bearing: Weight Bearing As Tolerated          Other Position/Activity Restrictions: WBAT for transfers only; static standing         GROSS ASSESSMENT   Pt. Up in w/c upon arrival.        COGNITION Daily Assessment        Overall Cognitive Status: WFL        BED/MAT MOBILITY Daily Assessment     Supine to Sit: Supervision (for cues to utilize leg )  Sit to Supine: Contact guard assistance (for R LE)        TRANSFERS Daily Assessment   Toilet transfer w/ RW CGA-S Sit to Stand: Contact guard assistance;Supervision or touching assistance  Stand to Sit: Contact guard assistance;Supervision or touching assistance  Bed to Chair: Contact guard assistance          GAIT Daily Assessment     N/A       STEPS/STAIRS Daily Assessment     N/A         BALANCE Daily Assessment    Static Sitting: Good:  Pt. able to maintain balance w/o UE support;  exhibits some postural sway  Dynamic Sitting: Good - accepts moderate challenge;  can maintain balance while picking object off the floor  Static Standing: Good:  Pt. able to maintain balance w/o UE support;  exhibits some postural sway  Dynamic Standing: Fair - accepts minimal challenge;  can maintain balance while turning head/trunk       WHEELCHAIR MOBILITY Daily Assessment     NT              LOWER EXTREMITY EXERCISES   Pt. Performed supine LE exercises to increase strength & ROM.    SUPINE EXERCISES Sets Reps Comments   Ankle Pumps 1 12    Isometric hip adduction 1 12    Glut Sets 1 12 Pt. Unable to perform a functional bridge @ this time   Heel

## 2025-02-17 NOTE — PROGRESS NOTES
Warfarin dosing per pharmacist    Nancy Omalley is a 85 y.o. female.    Height: 165.1 cm (5' 5\")  Weight - Scale: 60.8 kg (134 lb)    Indication:  Afib    Goal INR:  2-3    Home dose:  5 mg on Fridays and 2.5 mg all remaining days    Risk factors or significant drug interactions:  post-op hip fracture due to fall    Other anticoagulants:  none    Lab Results   Component Value Date/Time    HGB 11.4 02/14/2025 05:17 AM    HGB 12.4 02/11/2025 04:58 AM    HGB 12.7 02/10/2025 05:06 AM     Lab Results   Component Value Date/Time    INR 2.9 02/17/2025 05:44 AM    INR 2.6 04/25/2022 11:35 AM        Daily Monitoring  Date  INR     Warfarin dose Notes  2/7  1.6  Held Vit K IV 10 mg  2/8  1.5  5 mg   2/9  1.2  5 mg   2/10  1.2  5 mg  2/11  1.4  2.5 mg  2/12  2.1  1 mg  2/13  2.1  2.5 mg  2/14  2  5 mg  2/15  1.8  5 mg  2/16  2.4  2.5 mg  2/17  2.9  Hold      Pharmacy was consulted to assist with warfarin dosing for Ms. Omalley during this admission. Her home regimen, indication, and INR goal were confirmed via reviewing an outpatient anticoagulation note. Of note, INR was subtherapeutic at her last appointment on 1.7 and again on admission at 1.6. Vitamin K 10 mg was administered overnight on 2/7 pre-operatively. Warfarin was resumed by ortho surgery on 2/8 POD0.    Today, INR continues to trend up, remaining therapeutic at 2.9. Given the magnitude of the increase of INR over the past 48 hours, will hold warfarin tonight. Further dosing decisions will be based on future INR trends.    Pharmacy will continue to follow. Please call with any questions.    Thank you,  Clay Lizarraga [facility]

## 2025-02-17 NOTE — PLAN OF CARE
Problem: Chronic Conditions and Co-morbidities  Goal: Patient's chronic conditions and co-morbidity symptoms are monitored and maintained or improved  Outcome: Progressing  Flowsheets (Taken 2/16/2025 1935)  Care Plan - Patient's Chronic Conditions and Co-Morbidity Symptoms are Monitored and Maintained or Improved: Monitor and assess patient's chronic conditions and comorbid symptoms for stability, deterioration, or improvement     Problem: Skin/Tissue Integrity  Goal: Skin integrity remains intact  Description: 1.  Monitor for areas of redness and/or skin breakdown  2.  Assess vascular access sites hourly  3.  Every 4-6 hours minimum:  Change oxygen saturation probe site  4.  Every 4-6 hours:  If on nasal continuous positive airway pressure, respiratory therapy assess nares and determine need for appliance change or resting period  Outcome: Progressing  Flowsheets (Taken 2/16/2025 1935)  Skin Integrity Remains Intact: Monitor for areas of redness and/or skin breakdown     Problem: Safety - Adult  Goal: Free from fall injury  Outcome: Progressing     Problem: ABCDS Injury Assessment  Goal: Absence of physical injury  Outcome: Progressing     Problem: Pain  Goal: Verbalizes/displays adequate comfort level or baseline comfort level  Outcome: Progressing

## 2025-02-17 NOTE — PROGRESS NOTES
[place] [address]  Tel: [phone]     Physical Medicine and Rehabilitation Progress Note      Nancy Omalley  Admit Date: 2/10/2025  Admit Diagnosis: Mild traumatic brain injury, without loss of consciousness, sequela [S06.9X0S]    Subjective     Patient seen today during break in therapy. Patient reports posterior right HA last night at site of hematoma that resolved on its own. BP well-controlled, losartan discontinued (had been held x 7d). Bowels moving without diarrhea. All questions and concerns were addressed at this time.    Objective:     Current Facility-Administered Medications   Medication Dose Route Frequency    warfarin placeholder: dosing by pharmacy   Oral RX Placeholder    polyethylene glycol (GLYCOLAX) packet 17 g  17 g Oral Daily PRN    sennosides-docusate sodium (SENOKOT-S) 8.6-50 MG tablet 2 tablet  2 tablet Oral QHS    lactobacillus (CULTURELLE) capsule 1 capsule  1 capsule Oral Daily with breakfast    levothyroxine (SYNTHROID) tablet 137 mcg  137 mcg Oral QAM AC    pantoprazole (PROTONIX) tablet 40 mg  40 mg Oral QAM AC    acetaminophen (TYLENOL) tablet 1,000 mg  1,000 mg Oral TID    metoprolol succinate (TOPROL XL) extended release tablet 25 mg  25 mg Oral Daily    sotalol (BETAPACE) tablet 120 mg  120 mg Oral Q12H    traZODone (DESYREL) tablet 50 mg  50 mg Oral Nightly PRN    sodium phosphate (FLEET) rectal enema 1 enema  1 enema Rectal Daily PRN    bisacodyl (DULCOLAX) suppository 10 mg  10 mg Rectal Daily PRN    calcium carbonate (TUMS) chewable tablet 500 mg  500 mg Oral TID PRN    hydrALAZINE (APRESOLINE) tablet 10 mg  10 mg Oral TID PRN    ondansetron (ZOFRAN-ODT) disintegrating tablet 4 mg  4 mg Oral Q8H PRN    carboxymethylcellulose (THERATEARS) 1 % ophthalmic gel 1 drop  1 drop Both Eyes TID PRN    Benzocaine-Menthol (CEPACOL SORE THROAT) 15-2.6 MG lozenge 1 lozenge  1 lozenge Oral Q2H PRN    sodium chloride

## 2025-02-17 NOTE — PROGRESS NOTES
PHYSICAL THERAPY DAILY NOTE    PT Individual Minutes  Time In: 1121  Time Out: 1208  Minutes: 47                 Total Treatment Time:  47 Minutes    Pt. Seen for: AM, Therapeutic Exercise, and Transfer Training     Subjective: Pt. Reports increased pain in her R knee this session.         Objective:  Restrictions/Precautions: Fall Risk, Weight Bearing  Required Braces or Orthoses?: No   Right Lower Extremity Weight Bearing: Weight Bearing As Tolerated          Other Position/Activity Restrictions: WBAT for transfers only; static standing         GROSS ASSESSMENT   Pt. Up in w/c upon arrival.        COGNITION Daily Assessment        Overall Cognitive Status: WFL        BED/MAT MOBILITY Daily Assessment     NT this session       TRANSFERS Daily Assessment    Sit to Stand: Contact guard assistance;Supervision or touching assistance  Stand to Sit: Contact guard assistance;Supervision or touching assistance  Bed to Chair: Contact guard assistance (w/ RW)          GAIT Daily Assessment     NA       STEPS/STAIRS Daily Assessment    NA         BALANCE Daily Assessment    Static Sitting: Good:  Pt. able to maintain balance w/o UE support;  exhibits some postural sway  Dynamic Sitting: Good - accepts moderate challenge;  can maintain balance while picking object off the floor  Static Standing: Fair:  Pt. requires UE support;  may need occasional min A  Dynamic Standing: Fair - accepts minimal challenge;  can maintain balance while turning head/trunk       WHEELCHAIR MOBILITY Daily Assessment     NT              LOWER EXTREMITY EXERCISES   Pt. Performed standing exercises on R LE to increase strength & ROM.  Utilized B UE w/ RW as well as supervision assist & 2 seated rest breaks.  STANDING EXERCISES Sets Reps Comments   Heel Raises 1 10    Toe Raises 1 10    Hip Flexion/Marching 1 10    Hamstring Curls 1 10    Hip Abduction 1 10    Hip Extension 1 10    Hip ER 1 10       Pt. Performed motomed @ resistance level 2 x10 minutes

## 2025-02-17 NOTE — PROGRESS NOTES
[facility] OCCUPATIONAL THERAPY DAILY NOTE  OT Individual Minutes  Time In: 0747  Time Out: 0915  Minutes: 88               Subjective: Pt agreeable to treatment. \"Yes, I think he's coming in on Friday\" In regards to family/caregiver training.   Pain: RN notified  and RLE hip pain/RN administered pain meds and OT applied ice towards end of session and pt reported pain relief .  Interdisciplinary Communication: Collaborated with PT and RN regarding pt status, pt performance, and handoff communication.   Precautions: Falls, Posey Alarm, and WBAT RLE t/fs only  Lines:N/A  O2 Device: RA    FUNCTIONAL MOBILITY:       Bed Mobility NT    Sit to Stand SBA Close SBA with FWW   Transfer  SBA  Transfer Type: SPT  Equipment: RW Close SBA with FWW and few cues on technique; few STS's during ADL's; SPT EOB > W/C   Ambulation NT  Equipment: NA Pt self propelled w/c in room for ADL's SPV     ACTIVITIES OF DAILY LIVING:       Oral Hygiene Setup or clean-up assistance S/U A seated sinkside-MOD I in w/c propelling to sinkside   Shower/Bathe Partial/moderate assistance MIN A for feet sponge bath while seated EOB good trunk stability, standing with FWW close SBA with no LOB for aamir cares   Upper Body  Dressing Setup or clean-up assistance S/U A seated EOB for gown/don bra/tshirt/cardigan   Lower Body Dressing Supervision or touching assistance close SBA to doff brief standing/manage pants over hips standing with FWW no LOB-use of reacher with cues on technique   Donning/[place] Footwear Partial/moderate assistance Pt able to doff B socks SPV seated EOB with use of reacher and demonstration required, pt able to don B socks figure four LLE/sock aid RLE, cues on technique, use of shoe funnel to don B shoes; pt able to tie L shoe figure four, MIN A for RLE tieing   Education Adaptive ADL Techniques, AE/DME Training, Benefits of OT, Energy Conservation, Pacing, Functional Transfer Training, Precautions, Rolling Walker Management, Safety Awareness,

## 2025-02-18 LAB
ANION GAP SERPL CALC-SCNC: 7 MMOL/L (ref 7–16)
BASOPHILS # BLD: 0.06 K/UL (ref 0–0.2)
BASOPHILS NFR BLD: 1 % (ref 0–2)
BUN SERPL-MCNC: 16 MG/DL (ref 8–23)
CALCIUM SERPL-MCNC: 9 MG/DL (ref 8.8–10.2)
CHLORIDE SERPL-SCNC: 106 MMOL/L (ref 98–107)
CO2 SERPL-SCNC: 28 MMOL/L (ref 20–29)
CREAT SERPL-MCNC: 0.65 MG/DL (ref 0.6–1.1)
DIFFERENTIAL METHOD BLD: ABNORMAL
EOSINOPHIL # BLD: 0.12 K/UL (ref 0–0.8)
EOSINOPHIL NFR BLD: 2 % (ref 0.5–7.8)
ERYTHROCYTE [DISTWIDTH] IN BLOOD BY AUTOMATED COUNT: 17.2 % (ref 11.9–14.6)
GLUCOSE SERPL-MCNC: 111 MG/DL (ref 70–99)
HCT VFR BLD AUTO: 33.8 % (ref 35.8–46.3)
HGB BLD-MCNC: 10.5 G/DL (ref 11.7–15.4)
IMM GRANULOCYTES # BLD AUTO: 0.11 K/UL (ref 0–0.5)
IMM GRANULOCYTES NFR BLD AUTO: 1.8 % (ref 0–5)
INR PPP: 2.8
LYMPHOCYTES # BLD: 1.53 K/UL (ref 0.5–4.6)
LYMPHOCYTES NFR BLD: 24.9 % (ref 13–44)
MAGNESIUM SERPL-MCNC: 1.7 MG/DL (ref 1.8–2.4)
MCH RBC QN AUTO: 28.7 PG (ref 26.1–32.9)
MCHC RBC AUTO-ENTMCNC: 31.1 G/DL (ref 31.4–35)
MCV RBC AUTO: 92.3 FL (ref 82–102)
MONOCYTES # BLD: 0.76 K/UL (ref 0.1–1.3)
MONOCYTES NFR BLD: 12.4 % (ref 4–12)
NEUTS SEG # BLD: 3.56 K/UL (ref 1.7–8.2)
NEUTS SEG NFR BLD: 57.9 % (ref 43–78)
NRBC # BLD: 0 K/UL (ref 0–0.2)
PLATELET # BLD AUTO: 216 K/UL (ref 150–450)
PMV BLD AUTO: 10 FL (ref 9.4–12.3)
POTASSIUM SERPL-SCNC: 4.1 MMOL/L (ref 3.5–5.1)
PROTHROMBIN TIME: 29.5 SEC (ref 11.3–14.9)
RBC # BLD AUTO: 3.66 M/UL (ref 4.05–5.2)
SODIUM SERPL-SCNC: 140 MMOL/L (ref 136–145)
WBC # BLD AUTO: 6.1 K/UL (ref 4.3–11.1)

## 2025-02-18 PROCEDURE — 6370000000 HC RX 637 (ALT 250 FOR IP): Performed by: STUDENT IN AN ORGANIZED HEALTH CARE EDUCATION/TRAINING PROGRAM

## 2025-02-18 PROCEDURE — 36415 COLL VENOUS BLD VENIPUNCTURE: CPT

## 2025-02-18 PROCEDURE — 83735 ASSAY OF MAGNESIUM: CPT

## 2025-02-18 PROCEDURE — 85610 PROTHROMBIN TIME: CPT

## 2025-02-18 PROCEDURE — 97530 THERAPEUTIC ACTIVITIES: CPT

## 2025-02-18 PROCEDURE — 80048 BASIC METABOLIC PNL TOTAL CA: CPT

## 2025-02-18 PROCEDURE — 97535 SELF CARE MNGMENT TRAINING: CPT

## 2025-02-18 PROCEDURE — 85025 COMPLETE CBC W/AUTO DIFF WBC: CPT

## 2025-02-18 PROCEDURE — 97110 THERAPEUTIC EXERCISES: CPT

## 2025-02-18 PROCEDURE — 99233 SBSQ HOSP IP/OBS HIGH 50: CPT | Performed by: STUDENT IN AN ORGANIZED HEALTH CARE EDUCATION/TRAINING PROGRAM

## 2025-02-18 PROCEDURE — 2500000003 HC RX 250 WO HCPCS: Performed by: STUDENT IN AN ORGANIZED HEALTH CARE EDUCATION/TRAINING PROGRAM

## 2025-02-18 PROCEDURE — 1180000000 HC REHAB R&B

## 2025-02-18 RX ORDER — WARFARIN SODIUM 5 MG/1
2.5 TABLET ORAL DAILY
Status: DISCONTINUED | OUTPATIENT
Start: 2025-02-18 | End: 2025-02-20

## 2025-02-18 RX ORDER — OXYCODONE HYDROCHLORIDE 5 MG/1
5 TABLET ORAL EVERY 4 HOURS PRN
Qty: 9 TABLET | Refills: 0 | Status: SHIPPED | OUTPATIENT
Start: 2025-02-18 | End: 2025-02-21

## 2025-02-18 RX ADMIN — ACETAMINOPHEN 1000 MG: 500 TABLET, FILM COATED ORAL at 15:46

## 2025-02-18 RX ADMIN — TRAZODONE HYDROCHLORIDE 50 MG: 50 TABLET ORAL at 21:09

## 2025-02-18 RX ADMIN — Medication 1 CAPSULE: at 08:08

## 2025-02-18 RX ADMIN — ONDANSETRON 4 MG: 4 TABLET, ORALLY DISINTEGRATING ORAL at 21:15

## 2025-02-18 RX ADMIN — SOTALOL HYDROCHLORIDE 120 MG: 80 TABLET ORAL at 21:09

## 2025-02-18 RX ADMIN — WARFARIN SODIUM 2.5 MG: 5 TABLET ORAL at 17:35

## 2025-02-18 RX ADMIN — TUBERCULIN PURIFIED PROTEIN DERIVATIVE 5 UNITS: 5 INJECTION, SOLUTION INTRADERMAL at 13:03

## 2025-02-18 RX ADMIN — ACETAMINOPHEN 1000 MG: 500 TABLET, FILM COATED ORAL at 08:08

## 2025-02-18 RX ADMIN — OXYCODONE 5 MG: 5 TABLET ORAL at 01:40

## 2025-02-18 RX ADMIN — SOTALOL HYDROCHLORIDE 120 MG: 80 TABLET ORAL at 08:07

## 2025-02-18 RX ADMIN — PANTOPRAZOLE SODIUM 40 MG: 40 TABLET, DELAYED RELEASE ORAL at 06:08

## 2025-02-18 RX ADMIN — OXYCODONE 5 MG: 5 TABLET ORAL at 21:08

## 2025-02-18 RX ADMIN — LEVOTHYROXINE SODIUM 137 MCG: 0.05 TABLET ORAL at 06:08

## 2025-02-18 RX ADMIN — ACETAMINOPHEN 1000 MG: 500 TABLET, FILM COATED ORAL at 21:08

## 2025-02-18 ASSESSMENT — PAIN SCALES - GENERAL
PAINLEVEL_OUTOF10: 0
PAINLEVEL_OUTOF10: 7
PAINLEVEL_OUTOF10: 6
PAINLEVEL_OUTOF10: 0

## 2025-02-18 ASSESSMENT — PAIN DESCRIPTION - DESCRIPTORS
DESCRIPTORS: ACHING
DESCRIPTORS: ACHING

## 2025-02-18 ASSESSMENT — PAIN DESCRIPTION - ORIENTATION
ORIENTATION: RIGHT
ORIENTATION: RIGHT

## 2025-02-18 ASSESSMENT — PAIN - FUNCTIONAL ASSESSMENT: PAIN_FUNCTIONAL_ASSESSMENT: ACTIVITIES ARE NOT PREVENTED

## 2025-02-18 ASSESSMENT — PAIN DESCRIPTION - LOCATION
LOCATION: HIP
LOCATION: HIP

## 2025-02-18 ASSESSMENT — PAIN SCALES - WONG BAKER: WONGBAKER_NUMERICALRESPONSE: NO HURT

## 2025-02-18 NOTE — PROGRESS NOTES
[place] [address]  Tel: [phone]     Physical Medicine and Rehabilitation Progress Note      Nancy Omalley  Admit Date: 2/10/2025  Admit Diagnosis: Mild traumatic brain injury, without loss of consciousness, sequela [S06.9X0S]    Subjective     Patient seen today during break in therapy and again during team conference. Patient reports sleeping poorly 1st 1/2 of night due to R LE pain; relieved by oxy IR and ice application. Patient asks about further rehab at SNF since she does not believe she can manage limits of weight-bearing at home, ongoing upper extremity pain (reports needs shoulder replacements). PPD placed.  Patient reports her cardiologist told her she could stop one of her beta-blockers, she said he reported this via [employer].  Will consult cardiology for formal recommendations.  All questions and concerns were addressed at this time.    Team conference held and attended today.    Objective:     Current Facility-Administered Medications   Medication Dose Route Frequency    warfarin (COUMADIN) tablet 2.5 mg  2.5 mg Oral Daily    tuberculin injection 5 Units  5 Units IntraDERmal Once    polyethylene glycol (GLYCOLAX) packet 17 g  17 g Oral Daily PRN    sennosides-docusate sodium (SENOKOT-S) 8.6-50 MG tablet 2 tablet  2 tablet Oral QHS    lactobacillus (CULTURELLE) capsule 1 capsule  1 capsule Oral Daily with breakfast    levothyroxine (SYNTHROID) tablet 137 mcg  137 mcg Oral QAM AC    pantoprazole (PROTONIX) tablet 40 mg  40 mg Oral QAM AC    acetaminophen (TYLENOL) tablet 1,000 mg  1,000 mg Oral TID    metoprolol succinate (TOPROL XL) extended release tablet 25 mg  25 mg Oral Daily    sotalol (BETAPACE) tablet 120 mg  120 mg Oral Q12H    traZODone (DESYREL) tablet 50 mg  50 mg Oral Nightly PRN    sodium phosphate (FLEET) rectal enema 1 enema  1 enema Rectal Daily PRN    bisacodyl (DULCOLAX) suppository 10 mg  10 mg Rectal Daily PRN  Soft, non-tender, not distended. Hypoactive BS.   EXT: +pulses, no calf tenderness to palpation/dorsiflexion. No edema.  SKIN: R LE dressing removed, incision intact with staples, scant old crusted blood, minimal aamir-incisional erythema, no induration or purulence. Dependent ecchymosis at thigh.  MSK: Decreased strength and guarding in proximal R LE but antigravity strength at HF, KE/KF. B UE and L LE strength full and unencumbered.  NEURO: No new focal motor or sensory deficits.  PSYCH: Cooperative and calm, normal insight and judgment    Right lateral thigh incision, 2/14/2025:        Right posterolateral neck bruise, 2/14/2025:          Labs/Studies:  Recent Results (from the past 72 hour(s))   Protime-INR    Collection Time: 02/16/25  6:16 AM   Result Value Ref Range    Protime 27.7 (H) 11.3 - 14.9 sec    INR 2.4     Protime-INR    Collection Time: 02/17/25  5:44 AM   Result Value Ref Range    Protime 32.2 (H) 11.3 - 14.9 sec    INR 2.9     Protime-INR    Collection Time: 02/18/25  7:23 AM   Result Value Ref Range    Protime 29.5 (H) 11.3 - 14.9 sec    INR 2.8     Basic Metabolic Panel    Collection Time: 02/18/25  7:23 AM   Result Value Ref Range    Sodium 140 136 - 145 mmol/L    Potassium 4.1 3.5 - 5.1 mmol/L    Chloride 106 98 - 107 mmol/L    CO2 28 20 - 29 mmol/L    Anion Gap 7 7 - 16 mmol/L    Glucose 111 (H) 70 - 99 mg/dL    BUN 16 8 - 23 MG/DL    Creatinine 0.65 0.60 - 1.10 MG/DL    Est, Glom Filt Rate 85 >60 ml/min/1.73m2    Calcium 9.0 8.8 - 10.2 MG/DL   CBC with Auto Differential    Collection Time: 02/18/25  7:23 AM   Result Value Ref Range    WBC 6.1 4.3 - 11.1 K/uL    RBC 3.66 (L) 4.05 - 5.2 M/uL    Hemoglobin 10.5 (L) 11.7 - 15.4 g/dL    Hematocrit 33.8 (L) 35.8 - 46.3 %    MCV 92.3 82 - 102 FL    MCH 28.7 26.1 - 32.9 PG    MCHC 31.1 (L) 31.4 - 35.0 g/dL    RDW 17.2 (H) 11.9 - 14.6 %    Platelets 216 150 - 450 K/uL    MPV 10.0 9.4 - 12.3 FL    nRBC 0.00 0.0 - 0.2 K/uL    Differential Type

## 2025-02-18 NOTE — CARE COORDINATION
RN CM in room for bedside rounds, patient sitting up in chair, alert and oriented, family present via phone. Will need HH RN, PT, OT. Wants [facility].  Family has questions regarding possible STR post IRC. List will be given. And referrals will be sent. They prefer [facility] or [facility], she prefers private room, and referral will be sent.     1410 Update - patient has been offered a private bed at [facility] and is aware of this, she wants to talk to Lindsay PINA and her family and decide what to do and will let me know in the morning.

## 2025-02-18 NOTE — PATIENT CARE CONFERENCE
[facility] [place], [place]  INPATIENT REHABILITATION  TEAM CONFERENCE NOTE    Conference Date: 2025  Admit Date: 2/10/2025  4:47 PM  Patient Name: Nancy Omalley    MRN: [id number]    : 1940 (85 y.o.)  Rehabilitation Admitting Diagnosis: Mild traumatic brain injury, without loss of consciousness, sequela [S06.9X0S]           Team Members Present at Conference:  : Hailee Lyn CM  Nurse:HARSHA Pacheco, RN  Occupational Therapist:Maria C Jordan OTR/L  Physical Therapist: Lindsay Means PT  Speech Therapist: DANIELLA    Patient present for conference; granddaughter on phone.  Patient and granddaughter with questions re subacute rehab after discharge   ---------------------------------------------------------------------------------------------------    Case Management:  Patient's PLOF: Independent with IADLs, Independent with ADLs, and Independent with mobility  Patient's Prior Living Situation: Lives with spouse/significant other  Baseline Supervision/Assistance: None  Current issues/needs regarding patient and family discharge status: None anticipated       ---------------------------------------------------------------------------------------------------    Functional Assessment:  Most Recent Mobility Scores Per Physical Therapy:   Bed/Mat Mobility Supine to Sit: Supervision (for cues to utulize leg )  Sit to Supine: Contact guard assistance (for R LE)  Bed Mobility Comments: Patient up sitting in WC following OT treatment   Transfers Sit to Stand: Contact guard assistance;Supervision or touching assistance  Stand to Sit: Contact guard assistance;Supervision or touching assistance  Bed to Chair: Contact guard assistance (w/ RW)  Stand Pivot Transfers: Contact guard assistance    Gait Distance: No ambulation  More Ambulation?: No          Steps/Stairs Stairs?: No        Wheelchair Mobility Wheelchair Type: Standard  Wheelchair Parts Management: Yes  Left Brakes Level

## 2025-02-18 NOTE — PROGRESS NOTES
PHYSICAL THERAPY DAILY NOTE    PT Individual Minutes  Time In: 1400  Time Out: 1516  Minutes: 76                 Total Treatment Time:  76 Minutes    Pt. Seen for: PM, Therapeutic Exercise, and Transfer Training     Subjective: \"I think I'm starting to get the hang of this.  It's getting easier.\"         Objective:  Restrictions/Precautions: Fall Risk, Weight Bearing  Required Braces or Orthoses?: No   Right Lower Extremity Weight Bearing: Weight Bearing As Tolerated          Other Position/Activity Restrictions: WBAT for transfers only; static standing         GROSS ASSESSMENT   Pt. Up in recliner upon arrival.        COGNITION Daily Assessment        Overall Cognitive Status: WFL        BED/MAT MOBILITY Daily Assessment     Sit to Supine: Supervision (using leg )        TRANSFERS Daily Assessment   Toilet transfer supervision w/ RW f    Pt. Requires supervision for occasional cues for recall of set-up, including recall of managing brakes on the w/c. Sit to Stand: Supervision  Stand to Sit: Supervision  Bed to Chair: Supervision (using RW)  Car Transfer: Supervision (car @ 31\" seat height to simulate pt's Gord Edge, using a 8\" step stool to assist w/ scooting into the car as well as a leg  to assist w/ R LE)          GAIT Daily Assessment   N/A        STEPS/STAIRS Daily Assessment   N/A        BALANCE Daily Assessment   Standing balance during lower body clothing management supervision Static Sitting: Good:  Pt. able to maintain balance w/o UE support;  exhibits some postural sway  Dynamic Sitting: Good - accepts moderate challenge;  can maintain balance while picking object off the floor  Static Standing: Fair:  Pt. requires UE support;  may need occasional min A  Dynamic Standing: Fair - accepts minimal challenge;  can maintain balance while turning head/trunk       WHEELCHAIR MOBILITY Daily Assessment     NT              LOWER EXTREMITY EXERCISES   Pt. Performed Motomed @ resistance level 2 x10

## 2025-02-18 NOTE — PLAN OF CARE
Problem: Chronic Conditions and Co-morbidities  Goal: Patient's chronic conditions and co-morbidity symptoms are monitored and maintained or improved  Outcome: Progressing  Flowsheets (Taken 2/18/2025 0808)  Care Plan - Patient's Chronic Conditions and Co-Morbidity Symptoms are Monitored and Maintained or Improved: Monitor and assess patient's chronic conditions and comorbid symptoms for stability, deterioration, or improvement     Problem: Skin/Tissue Integrity  Goal: Skin integrity remains intact  Description: 1.  Monitor for areas of redness and/or skin breakdown  2.  Assess vascular access sites hourly  3.  Every 4-6 hours minimum:  Change oxygen saturation probe site  4.  Every 4-6 hours:  If on nasal continuous positive airway pressure, respiratory therapy assess nares and determine need for appliance change or resting period  Outcome: Progressing  Flowsheets (Taken 2/18/2025 0808)  Skin Integrity Remains Intact: Monitor for areas of redness and/or skin breakdown     Problem: Safety - Adult  Goal: Free from fall injury  Outcome: Progressing  Flowsheets (Taken 2/18/2025 0808)  Free From Fall Injury: Instruct family/caregiver on patient safety     Problem: ABCDS Injury Assessment  Goal: Absence of physical injury  Outcome: Progressing     Problem: Pain  Goal: Verbalizes/displays adequate comfort level or baseline comfort level  Outcome: Progressing  Flowsheets (Taken 2/18/2025 0808)  Verbalizes/displays adequate comfort level or baseline comfort level:   Encourage patient to monitor pain and request assistance   Assess pain using appropriate pain scale   Administer analgesics based on type and severity of pain and evaluate response   Implement non-pharmacological measures as appropriate and evaluate response   Consider cultural and social influences on pain and pain management   Notify Licensed Independent Practitioner if interventions unsuccessful or patient reports new pain

## 2025-02-18 NOTE — PROGRESS NOTES
Attempted to see pt. For scheduled PT session.  Pt. Eating lunch & discussing d/c options with the team & unable to work w/ PT @ this time.  Will follow up in PM.

## 2025-02-18 NOTE — PROGRESS NOTES
Warfarin dosing per pharmacist    Nancy Omalley is a 85 y.o. female.    Height: 165.1 cm (5' 5\")  Weight - Scale: 60.8 kg (134 lb)    Indication:  Afib    Goal INR:  2-3    Home dose:  5 mg on Fridays and 2.5 mg all remaining days    Risk factors or significant drug interactions:  post-op hip fracture due to fall    Other anticoagulants:  none    Lab Results   Component Value Date/Time    HGB 10.5 02/18/2025 07:23 AM    HGB 11.4 02/14/2025 05:17 AM    HGB 12.4 02/11/2025 04:58 AM     Lab Results   Component Value Date/Time    INR 2.8 02/18/2025 07:23 AM    INR 2.6 04/25/2022 11:35 AM        Daily Monitoring  Date  INR     Warfarin dose Notes  2/7  1.6  Held Vit K IV 10 mg  2/8  1.5  5 mg   2/9  1.2  5 mg   2/10  1.2  5 mg  2/11  1.4  2.5 mg  2/12  2.1  1 mg  2/13  2.1  2.5 mg  2/14  2  5 mg  2/15  1.8  5 mg  2/16  2.4  2.5 mg  2/17  2.9  Hold  2/18  2.8  2.5 mg      Pharmacy was consulted to assist with warfarin dosing for Ms. Omalley during this admission. Her home regimen, indication, and INR goal were confirmed via reviewing an outpatient anticoagulation note. Of note, INR was subtherapeutic at her last appointment on 1.7 and again on admission at 1.6. Vitamin K 10 mg was administered overnight on 2/7 pre-operatively. Warfarin was resumed by ortho surgery on 2/8 POD 0.    INR stable at 2.8 today. Resume warfarin at 2.5 mg. Daily INR.     Pharmacy will continue to follow. Please call with any questions.    Thank you,  Rima Pappas, [facility]

## 2025-02-18 NOTE — PROGRESS NOTES
Pt's magnesium was 1.7 with this AM's labs and from 2/14. MD Gonzalez notified with  no new orders received for replacement.

## 2025-02-18 NOTE — PROGRESS NOTES
[facility] OCCUPATIONAL THERAPY DAILY NOTE  OT Individual Minutes  Time In: 0832  Time Out: 1001  Minutes: 89               Subjective: Pt agreeable to treatment. \"Do you have those elastic shoe laces?\"  Pt seen for two therapy sessions back to back d/t pt request for shower this date/required increased time for task.   Pain: Patient reports 3/10 pain, RN notified , and with RLE/RN administered meds, ice applied post session; some mild nausea this date with upright/resolved .  Interdisciplinary Communication: Collaborated with RN regarding pt status and pt performance.   Precautions:   Falls, Posey Alarm, and WBAT RLE t/fs only  Lines:N/A  O2 Device: RA    FUNCTIONAL MOBILITY:       Bed Mobility Supervision HOB elevated/use of bed rail   Sit to Stand SBA    Transfer  SBA  Transfer Type: SPT  Equipment: Grab Bars and RW Pt performed STS's during ADL's with SBA with FWW, SPT's from EOB> w/c > toilet > w/c > shower chair > w/c > toilet > w/c > recliner chair   Ambulation NT  Equipment: NA      ACTIVITIES OF DAILY LIVING:       Oral Hygiene Setup or clean-up assistance S/U A seated for g/h tasks with increased time   Shower/Bathe Supervision or touching assistance pt performed shower with use of tub t/f bench/HHSH/GB/long handled sponge, pt seated SPV/standing for posterior cares SBA with no LOB with use of GB; education on technique for LB drying   Upper Body  Dressing Partial/moderate assistance S/U A to don/doff overhead gown/tshirt and bra seated; MIN A required to don pull behind shirt jacket d/t impaired BUE's hx of RCT's   Lower Body Dressing Supervision or touching assistance SPV seated threading/unthreading increased time with use of reacher, standing FWW SBA for pants management over hips   Donning/[place] Footwear Setup or clean-up assistance S/U A with reacher to doff B socks seated, donned B socks with sock aid/donned B shoes with elastic shoe laces introduced this date pt liked them/use of shoe funnel S/U A

## 2025-02-19 LAB
INR PPP: 2.2
MM INDURATION, POC: 0 MM (ref 0–5)
PPD, POC: NEGATIVE
PROTHROMBIN TIME: 24 SEC (ref 11.3–14.9)

## 2025-02-19 PROCEDURE — 97530 THERAPEUTIC ACTIVITIES: CPT

## 2025-02-19 PROCEDURE — 97535 SELF CARE MNGMENT TRAINING: CPT

## 2025-02-19 PROCEDURE — 36415 COLL VENOUS BLD VENIPUNCTURE: CPT

## 2025-02-19 PROCEDURE — 97110 THERAPEUTIC EXERCISES: CPT

## 2025-02-19 PROCEDURE — 1180000000 HC REHAB R&B

## 2025-02-19 PROCEDURE — 6370000000 HC RX 637 (ALT 250 FOR IP): Performed by: STUDENT IN AN ORGANIZED HEALTH CARE EDUCATION/TRAINING PROGRAM

## 2025-02-19 PROCEDURE — 6370000000 HC RX 637 (ALT 250 FOR IP): Performed by: PHYSICIAN ASSISTANT

## 2025-02-19 PROCEDURE — 85610 PROTHROMBIN TIME: CPT

## 2025-02-19 PROCEDURE — 97542 WHEELCHAIR MNGMENT TRAINING: CPT

## 2025-02-19 PROCEDURE — 99232 SBSQ HOSP IP/OBS MODERATE 35: CPT | Performed by: INTERNAL MEDICINE

## 2025-02-19 RX ADMIN — OXYCODONE 5 MG: 5 TABLET ORAL at 02:44

## 2025-02-19 RX ADMIN — DOCUSATE SODIUM 50 MG AND SENNOSIDES 8.6 MG 2 TABLET: 8.6; 5 TABLET, FILM COATED ORAL at 21:14

## 2025-02-19 RX ADMIN — OXYCODONE 5 MG: 5 TABLET ORAL at 07:53

## 2025-02-19 RX ADMIN — ACETAMINOPHEN 1000 MG: 500 TABLET, FILM COATED ORAL at 21:13

## 2025-02-19 RX ADMIN — SOTALOL HYDROCHLORIDE 120 MG: 80 TABLET ORAL at 07:53

## 2025-02-19 RX ADMIN — PANTOPRAZOLE SODIUM 40 MG: 40 TABLET, DELAYED RELEASE ORAL at 05:12

## 2025-02-19 RX ADMIN — OXYCODONE 5 MG: 5 TABLET ORAL at 14:57

## 2025-02-19 RX ADMIN — OXYCODONE 5 MG: 5 TABLET ORAL at 21:13

## 2025-02-19 RX ADMIN — WARFARIN SODIUM 2.5 MG: 5 TABLET ORAL at 17:44

## 2025-02-19 RX ADMIN — ACETAMINOPHEN 1000 MG: 500 TABLET, FILM COATED ORAL at 07:51

## 2025-02-19 RX ADMIN — Medication 1 CAPSULE: at 07:53

## 2025-02-19 RX ADMIN — ACETAMINOPHEN 1000 MG: 500 TABLET, FILM COATED ORAL at 14:55

## 2025-02-19 RX ADMIN — SOTALOL HYDROCHLORIDE 120 MG: 80 TABLET ORAL at 21:13

## 2025-02-19 RX ADMIN — TRAZODONE HYDROCHLORIDE 50 MG: 50 TABLET ORAL at 21:13

## 2025-02-19 RX ADMIN — LEVOTHYROXINE SODIUM 137 MCG: 0.05 TABLET ORAL at 05:12

## 2025-02-19 ASSESSMENT — PAIN DESCRIPTION - ORIENTATION
ORIENTATION: RIGHT

## 2025-02-19 ASSESSMENT — PAIN DESCRIPTION - LOCATION
LOCATION: HIP
LOCATION: KNEE
LOCATION: HIP;INCISION
LOCATION: KNEE
LOCATION: HIP
LOCATION: HIP;INCISION

## 2025-02-19 ASSESSMENT — PAIN SCALES - GENERAL
PAINLEVEL_OUTOF10: 2
PAINLEVEL_OUTOF10: 5
PAINLEVEL_OUTOF10: 6
PAINLEVEL_OUTOF10: 5
PAINLEVEL_OUTOF10: 7
PAINLEVEL_OUTOF10: 0
PAINLEVEL_OUTOF10: 6
PAINLEVEL_OUTOF10: 0
PAINLEVEL_OUTOF10: 0
PAINLEVEL_OUTOF10: 7

## 2025-02-19 ASSESSMENT — PAIN DESCRIPTION - DESCRIPTORS
DESCRIPTORS: ACHING

## 2025-02-19 ASSESSMENT — PAIN SCALES - WONG BAKER: WONGBAKER_NUMERICALRESPONSE: HURTS A LITTLE BIT

## 2025-02-19 NOTE — PROGRESS NOTES
PHYSICAL THERAPY DAILY NOTE    PT Individual Minutes  Time In: 1030  Time Out: 1115  Minutes: 45                 Total Treatment Time:  76 Minutes    Pt. Seen for: AM, Therapeutic Exercise, Transfer Training, and Wheelchair mobility     Subjective: \"I know I'm at the point I will be until I can put more weight on my leg.\"         Objective:  Restrictions/Precautions: Fall Risk, Weight Bearing  Required Braces or Orthoses?: No   Right Lower Extremity Weight Bearing: Weight Bearing As Tolerated          Other Position/Activity Restrictions: WBAT for transfers only; static standing         GROSS ASSESSMENT   Pt. Up in recliner upon arrival        COGNITION Daily Assessment        Overall Cognitive Status: WFL        BED/MAT MOBILITY Daily Assessment    NT this AM       TRANSFERS Daily Assessment   Toilet transfer set-up assist Sit to Stand: Modified independent  Stand to Sit: Modified independent  Bed to Chair: Supervision (pt. requires cues for recall of brakes on w/c)          GAIT Daily Assessment    N/A       STEPS/STAIRS Daily Assessment     N/A         BALANCE Daily Assessment   Standing balance during lower body clothing management distant supervision Static Sitting: Good:  Pt. able to maintain balance w/o UE support;  exhibits some postural sway  Dynamic Sitting: Good - accepts moderate challenge;  can maintain balance while picking object off the floor  Static Standing: Good:  Pt. able to maintain balance w/o UE support;  exhibits some postural sway  Dynamic Standing: Fair - accepts minimal challenge;  can maintain balance while turning head/trunk       WHEELCHAIR MOBILITY Daily Assessment        Propulsion: Manual  Level: Level Tile  Method: RUE;LUE;LLE  Level of Assistance: Modified independent  Distance: 100'                 LOWER EXTREMITY EXERCISES   Pt. Performed standing exercises w/ B UE support w/ RW.  Exercises performed on R LE only w/ 1 seated rest break & supervision assist.    STANDING EXERCISES

## 2025-02-19 NOTE — PROGRESS NOTES
[facility] CARDIOLOGY PROGRESS NOTE    2/19/2025 8:02 AM    Admit Date: 2/10/2025        Subjective:   Stable overnight without angina, CHF, or palpitations. Vitals stable and controlled. No other complaints overnight. Tolerating meds well.           Objective:      Vitals:    02/18/25 1942 02/19/25 0632 02/19/25 0745 02/19/25 0753   BP: 138/64  116/65    Pulse: 74 75 76    Resp: 18 18 18   Temp: 97.7 °F (36.5 °C)  98.6 °F (37 °C)    TempSrc: Oral  Oral    SpO2: 99%  96%    Weight:       Height:           Physical Exam:  Neck- supple, no JVD  CV- regular rate and rhythm no MRG  Lung- clear bilaterally  Abd- soft, nontender, nondistended  Ext- no edema  Skin- warm and dry    Data Review:   Pertinent lab and noninvasive imaging over the past 24 hours reviewed and evaluated.    Recent Labs     02/18/25  0723 02/19/25  0621     --    K 4.1  --    MG 1.7*  --    BUN 16  --    WBC 6.1  --    HGB 10.5*  --    HCT 33.8*  --      --    INR 2.8 2.2     No results found for: \"LDL\", \"LDLDIRECT\"    Assessment and Plan:     Active Hospital Problems    Femoral neck fracture-status post surgery rehab as tolerated status post hip surgery      Decreased independence with activities of daily living-rehab as tolerated      Long term (current) use of anticoagulants-continue warfarin with daily INR, target INR 2-3      Hypertension-stable, continue meds.  Okay to stop beta-blocker but continue sotalol.  Resume beta-blocker if develops increased ectopy, tachycardia or hypertension.      Paroxysmal atrial fibrillation (HCC)-status post AVJ ablation in the past and on warfarin and sotalol chronically.  Continue to monitor telemetry.  Key potassium over 4 magnesium over 2.  Currently regular rhythm and potentially AV pacing but there is baseline artifact making interpretation difficult.  Follow telemetry while patient is in the hospital.       QUAN DICKSON MD

## 2025-02-19 NOTE — PROGRESS NOTES
PHYSICAL THERAPY DAILY NOTE    PT Individual Minutes  Time In: 1301  Time Out: 1352  Minutes: 51                 Total Treatment Time:  51 Minutes    Pt. Seen for: PM, Therapeutic Exercise, Transfer Training, and Wheelchair mobility     Subjective: \"I definitely feel like I'm moving my leg better.\"         Objective:  Restrictions/Precautions: Fall Risk, Weight Bearing  Required Braces or Orthoses?: No   Right Lower Extremity Weight Bearing: Weight Bearing As Tolerated          Other Position/Activity Restrictions: WBAT for transfers only; static standing         GROSS ASSESSMENT   Pt. Up in recliner upon arrival.        COGNITION Daily Assessment        Overall Cognitive Status: WFL        BED/MAT MOBILITY Daily Assessment     Supine to Sit: Modified independent  Sit to Supine: Modified independent (using leg lfiter R LE)        TRANSFERS Daily Assessment   Toilet transfer set-up assist Sit to Stand: Modified independent  Stand to Sit: Modified independent  Bed to Chair: Supervision (w/ RW)          GAIT Daily Assessment    N/A       STEPS/STAIRS Daily Assessment    N/A         BALANCE Daily Assessment   Standing balance during lower body clothing management mod I w/ RW Static Sitting: Good:  Pt. able to maintain balance w/o UE support;  exhibits some postural sway  Dynamic Sitting: Good - accepts moderate challenge;  can maintain balance while picking object off the floor  Static Standing: Good:  Pt. able to maintain balance w/o UE support;  exhibits some postural sway  Dynamic Standing: Fair - accepts minimal challenge;  can maintain balance while turning head/trunk       WHEELCHAIR MOBILITY Daily Assessment   Worked on setting up w/c for transfers with patient requiring Vcs to problem solve how to safely set up legrests to remove tripping hazards.  Pt. Then set-up w/c for transfers to toilet & back to bed w/ min Vcs required.    Pt. Transitioned to a 16x16 w/c to simulate w/c size she will be using @ home.

## 2025-02-19 NOTE — PROGRESS NOTES
Warfarin dosing per pharmacist    Nancy Omalley is a 85 y.o. female.    Height: 165.1 cm (5' 5\")  Weight - Scale: 60.8 kg (134 lb)    Indication:  Afib    Goal INR:  2-3    Home dose:  5 mg on Fridays and 2.5 mg all remaining days    Risk factors or significant drug interactions:  post-op hip fracture due to fall    Other anticoagulants:  none    Lab Results   Component Value Date/Time    HGB 10.5 02/18/2025 07:23 AM    HGB 11.4 02/14/2025 05:17 AM    HGB 12.4 02/11/2025 04:58 AM     Lab Results   Component Value Date/Time    INR 2.2 02/19/2025 06:21 AM    INR 2.6 04/25/2022 11:35 AM        Daily Monitoring  Date  INR     Warfarin dose Notes  2/7  1.6  Held Vit K IV 10 mg  2/8  1.5  5 mg   2/9  1.2  5 mg   2/10  1.2  5 mg  2/11  1.4  2.5 mg  2/12  2.1  1 mg  2/13  2.1  2.5 mg  2/14  2  5 mg  2/15  1.8  5 mg  2/16  2.4  2.5 mg  2/17  2.9  Hold  2/18  2.8  2.5 mg  2/19  2.2  2.5 mg        Pharmacy was consulted to assist with warfarin dosing for Ms. Omalley during this admission. Her home regimen, indication, and INR goal were confirmed via reviewing an outpatient anticoagulation note. Of note, INR was subtherapeutic at her last appointment on 1.7 and again on admission at 1.6. Vitamin K 10 mg was administered overnight on 2/7 pre-operatively. Warfarin was resumed by ortho surgery on 2/8 POD 0.    INR 2.2.  Continue warfarin at 2.5 mg. Daily INR.     Pharmacy will continue to follow. Please call with any questions.    Thank you,  Joe Julio, [facility]

## 2025-02-19 NOTE — CARE COORDINATION
RN CM in room. Patient sitting up in chair, alert and oriented, states she does not want to go to [place] and does not want the semi private room at [facility]. Apologizes for the trouble but requests we send a referral to the [place]. She also asked me to speak to her granddaughter Jil, she also had some referrals she wanted. Spoke to Jil on the phone, sent referrals as requested. Explained to Jil when they were in I would write them down, send them to her in a text and take to patients room so they could discuss together. She v/u and MARIA TERESA VANN will continue to follow.

## 2025-02-19 NOTE — PROGRESS NOTES
[facility] OCCUPATIONAL THERAPY DAILY NOTE  OT Individual Minutes  Time In: 1115  Time Out: 1200  Minutes: 45               Subjective: Pt agreeable to treatment. \"I think I'm at the most I can do with my leg restrictions\".   Pain: No pain expressed.  Interdisciplinary Communication: Collaborated with PT and RN regarding pt status, pt performance, and handoff communication.   Precautions: Falls, Posey Alarm, and WBAT RLE t/fs only, telemetry (HR 70's80's during task, post session RN present with HR reaching up to 122, jumping down to 111, 80's and then back to 70's during t/f-RN aware).  Lines:N/A  O2 Device: RA    FUNCTIONAL MOBILITY:       Bed Mobility NT    Sit to Stand Supervision    Transfer  Supervision  Transfer Type: SPT  Equipment: RW    Ambulation NT  Equipment: NA       - Activity Tolerance - Balance - Cognition - Range of Motion - Strengthening - Visual-Perceptual    Pt engaged in Sequence board game while sitting to address standing balance/tolerance, visual perceptual skills, cognition, coordination, and activity tolerance for integration into functional tasks. Pt required no cues following initial game instructions. Task modified d/t BUE's hx RCT's with R > L, pt able to complete with RUE for strengthening/coordination. Few LUE for AAROM for RUE.     Pt participate in laundry folding task while standing to address standing balance/tolerance, vision/perception, cognition, and coordination. Pt folded ~18 pieces of laundry with no cues throughout for sequencing. Pt sorted clothing according to type with completion with SBA with w/c placed behind pt no LOB with 7 minutes of standing tolerance with noted to lean against table for balance.     Pt completed simulated medication management task to sort “pills” (beads) from 6 medication bottles into pillboxes (AM and PM) according to printed instructions on label. Pt accurately completed 5/6 medications. Pt required 2-3 indirect cues for problem solving. Pt then

## 2025-02-19 NOTE — PLAN OF CARE
Problem: Chronic Conditions and Co-morbidities  Goal: Patient's chronic conditions and co-morbidity symptoms are monitored and maintained or improved  2/18/2025 2150 by Aura Russ RN  Outcome: Progressing  Flowsheets (Taken 2/18/2025 1942)  Care Plan - Patient's Chronic Conditions and Co-Morbidity Symptoms are Monitored and Maintained or Improved: Monitor and assess patient's chronic conditions and comorbid symptoms for stability, deterioration, or improvement  2/18/2025 1117 by Lizbeth Ramos RN  Outcome: Progressing  Flowsheets (Taken 2/18/2025 0808)  Care Plan - Patient's Chronic Conditions and Co-Morbidity Symptoms are Monitored and Maintained or Improved: Monitor and assess patient's chronic conditions and comorbid symptoms for stability, deterioration, or improvement     Problem: Skin/Tissue Integrity  Goal: Skin integrity remains intact  Description: 1.  Monitor for areas of redness and/or skin breakdown  2.  Assess vascular access sites hourly  3.  Every 4-6 hours minimum:  Change oxygen saturation probe site  4.  Every 4-6 hours:  If on nasal continuous positive airway pressure, respiratory therapy assess nares and determine need for appliance change or resting period  2/18/2025 2150 by Aura Russ RN  Outcome: Progressing  Flowsheets  Taken 2/18/2025 1925 by Aura Russ RN  Skin Integrity Remains Intact: Monitor for areas of redness and/or skin breakdown  Taken 2/18/2025 1548 by Lizbeth Ramos RN  Skin Integrity Remains Intact: Monitor for areas of redness and/or skin breakdown  2/18/2025 1117 by Lizbeth Ramos RN  Outcome: Progressing  Flowsheets (Taken 2/18/2025 0808)  Skin Integrity Remains Intact: Monitor for areas of redness and/or skin breakdown     Problem: Safety - Adult  Goal: Free from fall injury  2/18/2025 2150 by Aura Russ RN  Outcome: Progressing  Flowsheets (Taken 2/18/2025 1548 by Lizbeth Ramos RN)  Free From Fall Injury: Instruct family/caregiver on patient safety  2/18/2025 1117

## 2025-02-19 NOTE — PROGRESS NOTES
[facility] OCCUPATIONAL THERAPY DAILY NOTE  OT Individual Minutes  Time In: 0746  Time Out: 0832  Minutes: 46               Subjective: Pt agreeable to treatment.   Pain: RN notified  and tolerable pain in RLE/RN administered pain meds and ice applied post session with relief .  Interdisciplinary Communication: Collaborated with RN regarding pt performance.   Precautions: Falls, Posey Alarm, and WBAT RLE t/fs only  Lines:N/A  O2 Device: RA      FUNCTIONAL MOBILITY:       Bed Mobility Setup/Clean-up Assist    Sit to Stand Supervision and SBA    Transfer  SBA  Transfer Type: SPT  Equipment: Grab Bars and RW  SPT from EOB > w/c > toilet > w/c > recliner chair with few cues    Ambulation NT  Equipment: NA      ACTIVITIES OF DAILY LIVING:       Oral Hygiene Setup or clean-up assistance S/U A while seated in w/c  and EOB for donning deoderant with good trunk stability   Shower/Bathe Supervision or touching assistance SPV EOB seated for partial sponge bath, standing with SBA for aamir cares   Upper Body  Dressing Setup or clean-up assistance S/U A to don/doff overhead shirt/bra seated and pull behind shirt   Lower Body Dressing Supervision or touching assistance SPV seated to thread/unthread pants/underwear with use of reacher, standing with GB SBA for over hips management   Donning/[place] Footwear Setup or clean-up assistance S/U A to doff B socks with reacher, don with sock aid, use of shoe funnel/elastic shoes laces while seated to don B shoes   Toileting Hygiene Supervision or touching assistance SPV seated, standing with pants SBA and aamir cares   Toilet Transfer Supervision or touching assistance SBA with GB SPT from w/c <> toilet with BSC placed over toilet for increased height/push off   Education Adaptive ADL Techniques, AE/DME Training, Benefits of OT, Energy Conservation, Pacing, Functional Transfer Training, Precautions, Rolling Walker Management, and Safety Awareness     Assessment: Pt demonstrated good participation

## 2025-02-19 NOTE — PROGRESS NOTES
Verbal order per Dr Sharpe (cardiology) for remote telemetry placed. Box #80 collected by this RN. Verified with monitor tech patient is AV paced with a heart rate of 75.

## 2025-02-19 NOTE — CONSULTS
Originally seen in consult on 2/8. Patient requesting to stop BB. Add to rounding list for 2/19    Katarnia Pappas, ALIYA - CNP

## 2025-02-19 NOTE — PLAN OF CARE
Problem: Chronic Conditions and Co-morbidities  Goal: Patient's chronic conditions and co-morbidity symptoms are monitored and maintained or improved  2/19/2025 0954 by Bryn Naranjo RN  Outcome: Progressing  Flowsheets (Taken 2/19/2025 0735)  Care Plan - Patient's Chronic Conditions and Co-Morbidity Symptoms are Monitored and Maintained or Improved: Monitor and assess patient's chronic conditions and comorbid symptoms for stability, deterioration, or improvement  2/18/2025 2150 by Aura Russ RN  Outcome: Progressing  Flowsheets (Taken 2/18/2025 1942)  Care Plan - Patient's Chronic Conditions and Co-Morbidity Symptoms are Monitored and Maintained or Improved: Monitor and assess patient's chronic conditions and comorbid symptoms for stability, deterioration, or improvement     Problem: Skin/Tissue Integrity  Goal: Skin integrity remains intact  Description: 1.  Monitor for areas of redness and/or skin breakdown  2.  Assess vascular access sites hourly  3.  Every 4-6 hours minimum:  Change oxygen saturation probe site  4.  Every 4-6 hours:  If on nasal continuous positive airway pressure, respiratory therapy assess nares and determine need for appliance change or resting period  2/19/2025 0954 by Bryn Naranjo RN  Outcome: Progressing  Flowsheets (Taken 2/19/2025 0735)  Skin Integrity Remains Intact: Monitor for areas of redness and/or skin breakdown  2/18/2025 2150 by Aura Russ RN  Outcome: Progressing  Flowsheets  Taken 2/18/2025 1925 by Aura Russ RN  Skin Integrity Remains Intact: Monitor for areas of redness and/or skin breakdown  Taken 2/18/2025 1548 by Lizbeth Ramos RN  Skin Integrity Remains Intact: Monitor for areas of redness and/or skin breakdown     Problem: Safety - Adult  Goal: Free from fall injury  2/19/2025 0954 by Bryn Naranjo RN  Outcome: Progressing  2/18/2025 2150 by Aura Russ RN  Outcome: Progressing  Flowsheets (Taken 2/18/2025 1548 by Lizbeth Ramos RN)  Free From Fall

## 2025-02-20 LAB
ANION GAP SERPL CALC-SCNC: 13 MMOL/L (ref 7–16)
BASOPHILS # BLD: 0.07 K/UL (ref 0–0.2)
BASOPHILS NFR BLD: 1 % (ref 0–2)
BUN SERPL-MCNC: 13 MG/DL (ref 8–23)
CALCIUM SERPL-MCNC: 8.8 MG/DL (ref 8.8–10.2)
CHLORIDE SERPL-SCNC: 103 MMOL/L (ref 98–107)
CO2 SERPL-SCNC: 26 MMOL/L (ref 20–29)
CREAT SERPL-MCNC: 0.64 MG/DL (ref 0.6–1.1)
DIFFERENTIAL METHOD BLD: ABNORMAL
EOSINOPHIL # BLD: 0.13 K/UL (ref 0–0.8)
EOSINOPHIL NFR BLD: 1.8 % (ref 0.5–7.8)
ERYTHROCYTE [DISTWIDTH] IN BLOOD BY AUTOMATED COUNT: 17.8 % (ref 11.9–14.6)
GLUCOSE SERPL-MCNC: 111 MG/DL (ref 70–99)
HCT VFR BLD AUTO: 34.5 % (ref 35.8–46.3)
HGB BLD-MCNC: 11 G/DL (ref 11.7–15.4)
IMM GRANULOCYTES # BLD AUTO: 0.14 K/UL (ref 0–0.5)
IMM GRANULOCYTES NFR BLD AUTO: 2 % (ref 0–5)
INR PPP: 1.9
LYMPHOCYTES # BLD: 1.78 K/UL (ref 0.5–4.6)
LYMPHOCYTES NFR BLD: 24.9 % (ref 13–44)
MAGNESIUM SERPL-MCNC: 1.9 MG/DL (ref 1.8–2.4)
MCH RBC QN AUTO: 28.9 PG (ref 26.1–32.9)
MCHC RBC AUTO-ENTMCNC: 31.9 G/DL (ref 31.4–35)
MCV RBC AUTO: 90.8 FL (ref 82–102)
MONOCYTES # BLD: 0.77 K/UL (ref 0.1–1.3)
MONOCYTES NFR BLD: 10.8 % (ref 4–12)
NEUTS SEG # BLD: 4.26 K/UL (ref 1.7–8.2)
NEUTS SEG NFR BLD: 59.5 % (ref 43–78)
NRBC # BLD: 0 K/UL (ref 0–0.2)
PLATELET # BLD AUTO: 233 K/UL (ref 150–450)
PMV BLD AUTO: 9.6 FL (ref 9.4–12.3)
POTASSIUM SERPL-SCNC: 4 MMOL/L (ref 3.5–5.1)
PROTHROMBIN TIME: 21.3 SEC (ref 11.3–14.9)
RBC # BLD AUTO: 3.8 M/UL (ref 4.05–5.2)
SARS-COV-2 RDRP RESP QL NAA+PROBE: NOT DETECTED
SODIUM SERPL-SCNC: 142 MMOL/L (ref 136–145)
SOURCE: NORMAL
WBC # BLD AUTO: 7.2 K/UL (ref 4.3–11.1)

## 2025-02-20 PROCEDURE — 85610 PROTHROMBIN TIME: CPT

## 2025-02-20 PROCEDURE — 80048 BASIC METABOLIC PNL TOTAL CA: CPT

## 2025-02-20 PROCEDURE — 87635 SARS-COV-2 COVID-19 AMP PRB: CPT

## 2025-02-20 PROCEDURE — 97110 THERAPEUTIC EXERCISES: CPT

## 2025-02-20 PROCEDURE — 99232 SBSQ HOSP IP/OBS MODERATE 35: CPT | Performed by: INTERNAL MEDICINE

## 2025-02-20 PROCEDURE — 83735 ASSAY OF MAGNESIUM: CPT

## 2025-02-20 PROCEDURE — 6370000000 HC RX 637 (ALT 250 FOR IP): Performed by: PHYSICIAN ASSISTANT

## 2025-02-20 PROCEDURE — 36415 COLL VENOUS BLD VENIPUNCTURE: CPT

## 2025-02-20 PROCEDURE — 97542 WHEELCHAIR MNGMENT TRAINING: CPT

## 2025-02-20 PROCEDURE — 97530 THERAPEUTIC ACTIVITIES: CPT

## 2025-02-20 PROCEDURE — 97535 SELF CARE MNGMENT TRAINING: CPT

## 2025-02-20 PROCEDURE — 1180000000 HC REHAB R&B

## 2025-02-20 PROCEDURE — 6370000000 HC RX 637 (ALT 250 FOR IP): Performed by: STUDENT IN AN ORGANIZED HEALTH CARE EDUCATION/TRAINING PROGRAM

## 2025-02-20 PROCEDURE — 85025 COMPLETE CBC W/AUTO DIFF WBC: CPT

## 2025-02-20 RX ORDER — WARFARIN SODIUM 5 MG/1
5 TABLET ORAL DAILY
Status: DISCONTINUED | OUTPATIENT
Start: 2025-02-20 | End: 2025-02-21 | Stop reason: HOSPADM

## 2025-02-20 RX ADMIN — SOTALOL HYDROCHLORIDE 120 MG: 80 TABLET ORAL at 21:31

## 2025-02-20 RX ADMIN — OXYCODONE 5 MG: 5 TABLET ORAL at 03:25

## 2025-02-20 RX ADMIN — ACETAMINOPHEN 1000 MG: 500 TABLET, FILM COATED ORAL at 21:29

## 2025-02-20 RX ADMIN — OXYCODONE 5 MG: 5 TABLET ORAL at 21:30

## 2025-02-20 RX ADMIN — WARFARIN SODIUM 5 MG: 5 TABLET ORAL at 17:38

## 2025-02-20 RX ADMIN — DOCUSATE SODIUM 50 MG AND SENNOSIDES 8.6 MG 2 TABLET: 8.6; 5 TABLET, FILM COATED ORAL at 21:30

## 2025-02-20 RX ADMIN — ONDANSETRON 4 MG: 4 TABLET, ORALLY DISINTEGRATING ORAL at 00:27

## 2025-02-20 RX ADMIN — TRAZODONE HYDROCHLORIDE 50 MG: 50 TABLET ORAL at 21:33

## 2025-02-20 RX ADMIN — PANTOPRAZOLE SODIUM 40 MG: 40 TABLET, DELAYED RELEASE ORAL at 04:04

## 2025-02-20 RX ADMIN — LEVOTHYROXINE SODIUM 137 MCG: 0.05 TABLET ORAL at 04:04

## 2025-02-20 RX ADMIN — SOTALOL HYDROCHLORIDE 120 MG: 80 TABLET ORAL at 08:26

## 2025-02-20 RX ADMIN — ONDANSETRON 4 MG: 4 TABLET, ORALLY DISINTEGRATING ORAL at 10:08

## 2025-02-20 RX ADMIN — Medication 1 CAPSULE: at 08:26

## 2025-02-20 RX ADMIN — ACETAMINOPHEN 1000 MG: 500 TABLET, FILM COATED ORAL at 16:15

## 2025-02-20 RX ADMIN — ACETAMINOPHEN 1000 MG: 500 TABLET, FILM COATED ORAL at 08:26

## 2025-02-20 RX ADMIN — OXYCODONE 5 MG: 5 TABLET ORAL at 10:35

## 2025-02-20 ASSESSMENT — PAIN DESCRIPTION - LOCATION
LOCATION: HIP;LEG
LOCATION: HIP
LOCATION: LEG;HIP;KNEE

## 2025-02-20 ASSESSMENT — PAIN DESCRIPTION - ORIENTATION
ORIENTATION: RIGHT

## 2025-02-20 ASSESSMENT — PAIN SCALES - GENERAL
PAINLEVEL_OUTOF10: 7
PAINLEVEL_OUTOF10: 4
PAINLEVEL_OUTOF10: 7
PAINLEVEL_OUTOF10: 7
PAINLEVEL_OUTOF10: 0
PAINLEVEL_OUTOF10: 0

## 2025-02-20 ASSESSMENT — PAIN DESCRIPTION - DESCRIPTORS
DESCRIPTORS: ACHING

## 2025-02-20 NOTE — PROGRESS NOTES
[facility] OCCUPATIONAL THERAPY DAILY NOTE  OT Individual Minutes  Time In: 0831  Time Out: 1011  Minutes: 100               Subjective: Pt agreeable to treatment. \"I think my  is going to come in today for family training\". Clarification with pt and pt's spouse on phone for family caregiver training planned for 2/21/25 for OT at 915 and PT at 1030 am.   Pain: RN notified  and towards end of therapy session-pt reported burry vision/resolved and a \"flicker\" in L eye/resolving reported that this has happened in the past prior to hospitalization and just needs to give it \"time\", RN notified and aware; pt noted to have nausea following. Pt's HR during session 70's-90's .    BP post session seated in w/c: /58 (72), HR 75  BP following a few minutes BP 95/65 (75), HR 75  BP following a few more minutes with RN present L arm /55 (71), HR 75 no complaints of dizziness/lightheadedness.     Interdisciplinary Communication: Collaborated with RN regarding pt status and pt performance.   Precautions: Falls, Posey Alarm, and WBAT RLE t/fs only   Lines:Telemetry  O2 Device: RA      FUNCTIONAL MOBILITY:       Bed Mobility Modified Independent HOB elevated   Sit to Stand Supervision    Transfer  Supervision  Transfer Type: SPT  Equipment: Grab Bars and RW SPT from EOB> w/c > toilet> w/c> shower chair > w/c; STS's during adl's with FWW   Ambulation NT  Equipment: NA      ACTIVITIES OF DAILY LIVING:       Oral Hygiene Setup or clean-up assistance S/U A while seated in w/c sinkside with increased time   Shower/Bathe Supervision or touching assistance SPV for shower this date- seated with use of tub shower bench/HHSH/GB/Long handled sponge, standing with SPV with GB use , no LOB noted   Upper Body  Dressing Setup or clean-up assistance S/U A to doff overhead gown, seated to don bra/tank top/pull behind jacket   Lower Body Dressing Supervision or touching assistance S/U A seated to thread/unthread with use of reacher;

## 2025-02-20 NOTE — PLAN OF CARE
Problem: Chronic Conditions and Co-morbidities  Goal: Patient's chronic conditions and co-morbidity symptoms are monitored and maintained or improved  2/19/2025 2149 by Aura Russ RN  Outcome: Progressing  Flowsheets (Taken 2/19/2025 2000)  Care Plan - Patient's Chronic Conditions and Co-Morbidity Symptoms are Monitored and Maintained or Improved: Monitor and assess patient's chronic conditions and comorbid symptoms for stability, deterioration, or improvement  2/19/2025 0954 by Bryn Naranjo RN  Outcome: Progressing  Flowsheets (Taken 2/19/2025 0735)  Care Plan - Patient's Chronic Conditions and Co-Morbidity Symptoms are Monitored and Maintained or Improved: Monitor and assess patient's chronic conditions and comorbid symptoms for stability, deterioration, or improvement     Problem: Skin/Tissue Integrity  Goal: Skin integrity remains intact  Description: 1.  Monitor for areas of redness and/or skin breakdown  2.  Assess vascular access sites hourly  3.  Every 4-6 hours minimum:  Change oxygen saturation probe site  4.  Every 4-6 hours:  If on nasal continuous positive airway pressure, respiratory therapy assess nares and determine need for appliance change or resting period  2/19/2025 2149 by Aura Russ RN  Outcome: Progressing  Flowsheets (Taken 2/19/2025 2000)  Skin Integrity Remains Intact: Monitor for areas of redness and/or skin breakdown  2/19/2025 0954 by Bryn Naranjo RN  Outcome: Progressing  Flowsheets (Taken 2/19/2025 0735)  Skin Integrity Remains Intact: Monitor for areas of redness and/or skin breakdown     Problem: Safety - Adult  Goal: Free from fall injury  2/19/2025 2149 by Aura Russ RN  Outcome: Progressing  2/19/2025 0954 by Bryn Naranjo RN  Outcome: Progressing     Problem: ABCDS Injury Assessment  Goal: Absence of physical injury  2/19/2025 2149 by Aura Russ RN  Outcome: Progressing  2/19/2025 0954 by Bryn Naranjo RN  Outcome: Progressing     Problem: Pain  Goal:

## 2025-02-20 NOTE — PROGRESS NOTES
PHYSICAL THERAPY DISCHARGE SUMMARY    PT Individual Minutes  Time In: 1304  Time Out: 1355  Minutes: 51                   Total Treatment Time:  51 Minutes           Precautions at discharge:      Restrictions/Precautions: Fall Risk, Weight Bearing  Required Braces or Orthoses?: No   Right Lower Extremity Weight Bearing: Weight Bearing As Tolerated           Other Position/Activity Restrictions: WBAT for transfers only; static standing    Impairments:    Decreased LE strength  [x]     Decreased strength trunk/core  [x]     Decreased AROM   []     Decreased PROM  []    Decreased endurance  []     Decreased balance sitting  []     Decreased balance standing  [x]     Pain   [x]     Slow ambulation velocity  [x]    Decreased coordination  []    Decreased safety awareness  [x]       Functional Limitations:   Decreased independence with bed mobility  []     Decreased independence with functional transfers  [x]     Decreased independence with ambulation  [x]     Decreased independence with stair negotiation  [x]               Objective:     Vital Signs: BP 95/65   Pulse 75   Temp 97.9 °F (36.6 °C) (Oral)   Resp 16   Ht 1.651 m (5' 5\")   Wt 60.8 kg (134 lb)   SpO2 96%   BMI 22.30 kg/m²      Pain level: no c/o pain this PM     Patient education:    Education Given To: Patient  Education Provided: Transfer Training;Mobility Training;Precautions  Education Provided Comments: Pt. educated on how to get up from a low surface, such as a couch or recliner  Education Method: Demonstration;Verbal  Barriers to Learning: None  Education Outcome: Verbalized understanding;Demonstrated understanding;Continued education needed    Interdisciplinary Communication: Collaborated w/ OT regarding pt's progress.      Cognition:      Overall Cognitive Status: WFL    Lower Extremity Function:     LLE RLE   ROM WFL AROM WFL   Fine Motor Coordination Intact Intact   Tone Normal Normal   Sensation NT NT   Strength Generally decreased, but

## 2025-02-20 NOTE — CARE COORDINATION
Per patient request sent a text to granddaughter with information regarding possible STR placement and current options. They are going to talk as a family and let me know what their decision is, STR vs home with HH. RN CM will continue to follow.     1119 Update - patient has been offered a bed and accepted at [facility]. Per patient request made Jil the granddaughter aware via text. Primary RN made aware of ordered Covid test per facilities requirements.     1240 Update - patient will go to [facility] at [facility] via [employer] van with [employer] at 1230 02/21/2025. Report number [phone]. Packet complete with exception of DC summary to be added in the morning

## 2025-02-20 NOTE — PROGRESS NOTES
Warfarin dosing per pharmacist    Nancy Omalley is a 85 y.o. female.    Height: 165.1 cm (5' 5\")  Weight - Scale: 60.8 kg (134 lb)    Indication:  Afib    Goal INR:  2-3    Home dose:  5 mg on Fridays and 2.5 mg all remaining days    Risk factors or significant drug interactions:  post-op hip fracture due to fall    Other anticoagulants:  none    Lab Results   Component Value Date/Time    HGB 11.0 02/20/2025 03:13 AM    HGB 10.5 02/18/2025 07:23 AM    HGB 11.4 02/14/2025 05:17 AM     Lab Results   Component Value Date/Time    INR 1.9 02/20/2025 03:13 AM    INR 2.6 04/25/2022 11:35 AM        Daily Monitoring  Date  INR     Warfarin dose Notes  2/7  1.6  Held Vit K IV 10 mg  2/8  1.5  5 mg   2/9  1.2  5 mg   2/10  1.2  5 mg  2/11  1.4  2.5 mg  2/12  2.1  1 mg  2/13  2.1  2.5 mg  2/14  2  5 mg  2/15  1.8  5 mg  2/16  2.4  2.5 mg  2/17  2.9  Hold  2/18  2.8  2.5 mg  2/19  2.2  2.5 mg    2/20  1.9  5 mg      Pharmacy was consulted to assist with warfarin dosing for Ms. Omalley during this admission. Her home regimen, indication, and INR goal were confirmed via reviewing an outpatient anticoagulation note. Of note, INR was subtherapeutic at her last appointment on 1.7 and again on admission at 1.6. Vitamin K 10 mg was administered overnight on 2/7 pre-operatively. Warfarin was resumed by ortho surgery on 2/8 POD 0.    Today, INR has dropped to slightly subtherapeutic at 1.9. In response to this, will give a boosted warfarin dose of 5 mg tonight.    Pharmacy will continue to follow. Please call with any questions.    Thank you,  Clay Lizarraga [facility]

## 2025-02-20 NOTE — PROGRESS NOTES
Consulted for Sacrum. Pt does c/o discomfort/pain. Sits in chair for periods of time during the day; would benefit from chair cushion when OOB to reduce friction/shearing while sitting up in recliner.     Coccyx/buttocks w/ intact friction blisters, no drainage/odor. Recommend continence care, pink silicone border foam every other day&PRN.

## 2025-02-20 NOTE — PROGRESS NOTES
[facility] CARDIOLOGY PROGRESS NOTE    2/20/2025 7:18 AM    Admit Date: 2/10/2025        Subjective:   Stable overnight without angina, CHF, or palpitations. Vitals stable and controlled.  Normal sinus rhythm/atrial pacing with no A-fib on telemetry.  No other complaints overnight. Tolerating meds well.           Objective:      Vitals:    02/19/25 1457 02/19/25 1515 02/19/25 1527 02/19/25 2000   BP:  (!) 112/58  111/68   Pulse:  74  75   Resp: 18 18 18 18   Temp:  98.5 °F (36.9 °C)  97.9 °F (36.6 °C)   TempSrc:  Oral  Oral   SpO2:  96%  96%   Weight:       Height:           Physical Exam:  Neck- supple, no JVD  CV- regular rate and rhythm no MRG  Lung- clear bilaterally  Abd- soft, nontender, nondistended  Ext- no distal edema  Skin- warm and dry    Data Review:   Pertinent lab and noninvasive imaging over the past 24 hours reviewed and evaluated.    Recent Labs     02/18/25  0723 02/19/25  0621 02/20/25  0313     --  142   K 4.1  --  4.0   MG 1.7*  --  1.9   BUN 16  --  13   WBC 6.1  --  7.2   HGB 10.5*  --  11.0*   HCT 33.8*  --  34.5*     --  233   INR 2.8 2.2 1.9     No results found for: \"LDL\", \"LDLDIRECT\"    Assessment and Plan:     Active Hospital Problems    Femoral neck fracture-status post surgery rehab as tolerated status post hip surgery      Decreased independence with activities of daily living-rehab as tolerated      Long term (current) use of anticoagulants-continue warfarin with daily INR, target INR 2-3      Hypertension-stable, continue meds.  BP now marginal but asymptomatic.  Stopped beta-blocker yesterday, but continue sotalol.  Resume beta-blocker if develops increased ectopy, tachycardia or hypertension.      Paroxysmal atrial fibrillation (HCC)-status post AVJ ablation in the past and on warfarin and sotalol chronically.  Continue to monitor telemetry.  Keep potassium over 4 magnesium over 2.  Currently regular rhythm and potentially AV pacing but there is baseline artifact

## 2025-02-21 VITALS
WEIGHT: 134 LBS | OXYGEN SATURATION: 94 % | DIASTOLIC BLOOD PRESSURE: 66 MMHG | RESPIRATION RATE: 18 BRPM | TEMPERATURE: 98 F | HEART RATE: 75 BPM | HEIGHT: 65 IN | SYSTOLIC BLOOD PRESSURE: 114 MMHG | BODY MASS INDEX: 22.33 KG/M2

## 2025-02-21 LAB
INR PPP: 2.1
PROTHROMBIN TIME: 23.3 SEC (ref 11.3–14.9)

## 2025-02-21 PROCEDURE — 97535 SELF CARE MNGMENT TRAINING: CPT

## 2025-02-21 PROCEDURE — 6370000000 HC RX 637 (ALT 250 FOR IP): Performed by: STUDENT IN AN ORGANIZED HEALTH CARE EDUCATION/TRAINING PROGRAM

## 2025-02-21 PROCEDURE — 97530 THERAPEUTIC ACTIVITIES: CPT

## 2025-02-21 PROCEDURE — 97110 THERAPEUTIC EXERCISES: CPT

## 2025-02-21 PROCEDURE — 36415 COLL VENOUS BLD VENIPUNCTURE: CPT

## 2025-02-21 PROCEDURE — 85610 PROTHROMBIN TIME: CPT

## 2025-02-21 RX ADMIN — OXYCODONE 5 MG: 5 TABLET ORAL at 09:31

## 2025-02-21 RX ADMIN — OXYCODONE 5 MG: 5 TABLET ORAL at 03:50

## 2025-02-21 RX ADMIN — SOTALOL HYDROCHLORIDE 120 MG: 80 TABLET ORAL at 07:37

## 2025-02-21 RX ADMIN — PANTOPRAZOLE SODIUM 40 MG: 40 TABLET, DELAYED RELEASE ORAL at 06:24

## 2025-02-21 RX ADMIN — LEVOTHYROXINE SODIUM 137 MCG: 0.05 TABLET ORAL at 06:24

## 2025-02-21 RX ADMIN — ACETAMINOPHEN 1000 MG: 500 TABLET, FILM COATED ORAL at 07:37

## 2025-02-21 RX ADMIN — Medication 1 CAPSULE: at 07:37

## 2025-02-21 ASSESSMENT — PAIN SCALES - GENERAL
PAINLEVEL_OUTOF10: 6
PAINLEVEL_OUTOF10: 5
PAINLEVEL_OUTOF10: 0
PAINLEVEL_OUTOF10: 0

## 2025-02-21 ASSESSMENT — PAIN DESCRIPTION - DESCRIPTORS
DESCRIPTORS: ACHING
DESCRIPTORS: ACHING;THROBBING

## 2025-02-21 ASSESSMENT — PAIN DESCRIPTION - LOCATION
LOCATION: HIP
LOCATION: LEG

## 2025-02-21 ASSESSMENT — PAIN DESCRIPTION - ORIENTATION
ORIENTATION: RIGHT
ORIENTATION: RIGHT

## 2025-02-21 NOTE — PLAN OF CARE
Problem: Safety - Adult  Goal: Free from fall injury  2/21/2025 0758 by Corey Estevez, RN  Outcome: Progressing  2/20/2025 2302 by Alejandra Kelly, RN  Outcome: Progressing

## 2025-02-21 NOTE — PLAN OF CARE
Problem: Chronic Conditions and Co-morbidities  Goal: Patient's chronic conditions and co-morbidity symptoms are monitored and maintained or improved  2/21/2025 1037 by Corey Estevez RN  Outcome: HH/HSPC Resolved Met  2/20/2025 2302 by Alejandra Kelly RN  Outcome: Progressing     Problem: Skin/Tissue Integrity  Goal: Skin integrity remains intact  Description: 1.  Monitor for areas of redness and/or skin breakdown  2.  Assess vascular access sites hourly  3.  Every 4-6 hours minimum:  Change oxygen saturation probe site  4.  Every 4-6 hours:  If on nasal continuous positive airway pressure, respiratory therapy assess nares and determine need for appliance change or resting period  2/21/2025 1037 by Corey Estevez RN  Outcome: HH/HSPC Resolved Met  2/20/2025 2302 by Alejandra Kelly RN  Outcome: Progressing  Flowsheets (Taken 2/20/2025 1915)  Skin Integrity Remains Intact: Monitor for areas of redness and/or skin breakdown     Problem: Safety - Adult  Goal: Free from fall injury  2/21/2025 1037 by Corey Estevez RN  Outcome: HH/HSPC Resolved Met  2/21/2025 0758 by Corey Estevez RN  Outcome: Progressing  2/20/2025 2302 by Alejandra Kelly RN  Outcome: Progressing     Problem: ABCDS Injury Assessment  Goal: Absence of physical injury  Outcome: HH/HSPC Resolved Met     Problem: Pain  Goal: Verbalizes/displays adequate comfort level or baseline comfort level  Outcome: HH/HSPC Resolved Met

## 2025-02-21 NOTE — PROGRESS NOTES
TRANSFER - OUT REPORT:    Verbal report given to Zaira MOREL on Nancy Omalley  being transferred to [facility] room 315 for routine progression of patient care       Report consisted of patient's Situation, Background, Assessment and   Recommendations(SBAR).     Information from the following report(s) Nurse Handoff Report was reviewed with the receiving nurse.           Opportunity for questions and clarification was provided.      Patient transported with:  Wheelchair van

## 2025-02-21 NOTE — PROGRESS NOTES
Warfarin dosing per pharmacist    Nancy Omalley is a 85 y.o. female.    Height: 165.1 cm (5' 5\")  Weight - Scale: 60.8 kg (134 lb)    Indication:  Afib    Goal INR:  2-3    Home dose:  5 mg on Fridays and 2.5 mg all remaining days    Risk factors or significant drug interactions:  post-op hip fracture due to fall    Other anticoagulants:  none    Lab Results   Component Value Date/Time    HGB 11.0 02/20/2025 03:13 AM    HGB 10.5 02/18/2025 07:23 AM    HGB 11.4 02/14/2025 05:17 AM     Lab Results   Component Value Date/Time    INR 2.1 02/21/2025 03:36 AM    INR 2.6 04/25/2022 11:35 AM        Daily Monitoring  Date  INR     Warfarin dose Notes  2/7  1.6  Held Vit K IV 10 mg  2/8  1.5  5 mg   2/9  1.2  5 mg   2/10  1.2  5 mg  2/11  1.4  2.5 mg  2/12  2.1  1 mg  2/13  2.1  2.5 mg  2/14  2  5 mg  2/15  1.8  5 mg  2/16  2.4  2.5 mg  2/17  2.9  Hold  2/18  2.8  2.5 mg  2/19  2.2  2.5 mg    2/20  1.9  5 mg  2/21  2.1  5 mg      Pharmacy was consulted to assist with warfarin dosing for Ms. Omalley during this admission. Her home regimen, indication, and INR goal were confirmed via reviewing an outpatient anticoagulation note. Of note, INR was subtherapeutic at her last appointment on 1.7 and again on admission at 1.6. Vitamin K 10 mg was administered overnight on 2/7 pre-operatively. Warfarin was resumed by ortho surgery on 2/8 POD 0.    Today, INR is once again therapeutic at 2.1 after a boosted warfarin dose yesterday. Will tentatively resume home warfarin regimen, giving 5 mg tonight.    Pharmacy will continue to follow. Please call with any questions.    Thank you,  Clay Lizarraga [facility]

## 2025-02-21 NOTE — PROGRESS NOTES
PHYSICAL THERAPY DAILY NOTE    PT Individual Minutes  Time In: 0921  Time Out: 1002  Minutes: 41                 Total Treatment Time:  41 Minutes    Pt. Seen for: AM, Therapeutic Exercise, and Transfer Training     Subjective: \"I wish I could just stay here.\"         Objective:  Restrictions/Precautions: Fall Risk, Weight Bearing  Required Braces or Orthoses?: No   Right Lower Extremity Weight Bearing: Weight Bearing As Tolerated          Other Position/Activity Restrictions: WBAT for transfers only; static standing         GROSS ASSESSMENT   Pt. Up in recliner upon arrival.        COGNITION Daily Assessment        Overall Cognitive Status: WFL        BED/MAT MOBILITY Daily Assessment     Supine to Sit: Modified independent  Sit to Supine: Modified independent        TRANSFERS Daily Assessment    Sit to Stand: Modified independent  Stand to Sit: Modified independent  Bed to Chair: Supervision (w/ RW)          GAIT Daily Assessment     N/A       STEPS/STAIRS Daily Assessment     N/A         BALANCE Daily Assessment    Static Sitting: Good:  Pt. able to maintain balance w/o UE support;  exhibits some postural sway  Dynamic Sitting: Good - accepts moderate challenge;  can maintain balance while picking object off the floor  Static Standing: Good:  Pt. able to maintain balance w/o UE support;  exhibits some postural sway  Dynamic Standing: Fair - accepts minimal challenge;  can maintain balance while turning head/trunk       WHEELCHAIR MOBILITY Daily Assessment     NT              LOWER EXTREMITY EXERCISES   Pt. Performed supine LE exercises to increase strength & ROM B Les.    SUPINE EXERCISES Sets Reps Comments   Ankle Pumps 1 15    Isometric hip adduction 1 15    bridging 1 15    Heel Slides 1 15    Hip Abduction 1 15    Short Arc Quad 1 15    Straight Leg Raise 1 15 AAROM R LE   Hip IR 1 15          Pain level: 6/10  Pain Location:  R hip  Pain Interventions: RN contacted & provided pain medication    Vital Signs:

## 2025-02-21 NOTE — CARE COORDINATION
Patient with DC orders today. Patient will be going to the [facility] via [employer] van at 1230. No additional needs made known to RN CM. Patient and family with no questions or concerns. Patient has met all treatment goals and milestones for discharge. Patient to contact CM if any new needs arise.         02/21/25 0949   Services At/After Discharge   Services At/After Discharge Skilled Nursing Facility (SNF)    Resource Information Provided? No   Mode of Transport at Discharge BLS   Confirm Follow Up Transport Wheelchair Van   Condition of Participation: Discharge Planning   The Plan for Transition of Care is related to the following treatment goals: return to baseline with assistance of [facility]   The Patient and/or Patient Representative was provided with a Choice of Provider? Patient   The Patient and/Or Patient Representative agree with the Discharge Plan? Yes   Freedom of Choice list was provided with basic dialogue that supports the patient's individualized plan of care/goals, treatment preferences, and shares the quality data associated with the providers?  Yes

## 2025-02-21 NOTE — PROGRESS NOTES
[facility] OCCUPATIONAL THERAPY DISCHARGE NOTE  OT Individual Minutes  Time In: 0702  Time Out: 0747  Minutes: 45               GOALS:  GOALS:  Long Term Goals:  Time Frame for Long Term Goals: 10 days   LTG 1: Patient will dress UB with [place] using AE/DME PRN. Progressing 2/21/25.  LTG 2: Patient will dress LB with Supervision or Touching Assistance using AE/DME PRN. Goal met 2/21/25.  LTG 3: Patient will don footwear with [place] using AE/DME PRN. Goal met 2/21/25  LTG 4: Patient will bathe with Supervision or Touching Assistance using AE/DME PRN. Goal met 2/21/25  LTG 5: Patient will toilet with Supervision or Touching Assistance using AE/DME PRN. Goal met 2/21/25  LTG 6: Patient/caregiver will verbalize understanding of OT recommendations regarding ADLs, functional transfers, home safety, AE/DME, energy conservation, safety awareness, activity tolerance, and follow-up therapy to increase safety with functional tasks upon discharge.Goal met 2/21/25    Subjective: Patient agreeable to therapy.   Pain: No pain expressed.  Precautions:Falls, Hendricks Alarm, and WBAT RLE t/fs only   Lines:Telemetry  O2 Device: RA    FUNCTIONAL MOBILITY:        Bed Mobility Modified Independent HOB elevated   Sit to Stand Supervision    Transfer  Supervision  Transfer Type: SPT  Equipment: RW and Rollator SPT from EOB> w/c with FWW, w/c <> toilet, w/c > recliner chair FWW   Ambulation NT  Equipment: NA      ACTIVITIES OF DAILY LIVING:    Initial Score Current Score   Eating Setup or clean-up assistance  BROWN Independent  MOD I seated   Oral Hygiene Supervision or touching assistance  Item retrieval, cues to initiate Setup or clean-up assistance  S/U A seated donning deoderant EOB with good trunk stability   Shower/Bathe Partial/moderate assistance  ModA, bed bath seated EOB Supervision or touching assistance  sponge bath seated EOB/standing FWW for aamir cares with no LOB   Upper Body  Dressing Setup or clean-up assistance  BROWN. item retrieval

## 2025-02-21 NOTE — DISCHARGE SUMMARY
1000mg 3 times daily  -Oxycodone 5mg as needed for severe pain  -Ice 4 times daily, utilize as frequently as needed  -Wean and titrate off of narcotics as pain improves     //Atrial fibrillation  //HFpEF  -Cardiologist Dr. Doan saw patient s/p surgery, signed off  -Recommend to continue sotalol 120 mg twice daily  -Continue metoprolol 25 mg daily, Jardiance  -Lasix held secondary to hypotension s/p surgery. Monitor for dyspnea and/or edema  -Continue warfarin. INR goal 2-3. Per pt, did not want to transition to Eliquis (pt/doctor preference)  -Pharmacy consulted and following along, adjusting warfarin as needed  -Cardiology consulted (see above)     //Hypertension  -Continue metoprolol  - held starting 2/12 due to low BP  -Lasix held secondary to postop hypotension  -Blood pressure at goal, continue holding losartan  -BP goal 140/80  -2/17 losartan discontinued (had been held x 7d and BP well-controlled). No longer on HCTZ  -Continue monitoring BP. Titrate medications accordingly       Vitals:     02/18/25 0843   BP: 128/63   Pulse:     Resp:     Temp:     SpO2:        //GERD  -History of gastric polyps per patient  -Continue pantoprazole     //Hypothyroidism  -Continue home dose Synthroid                 Will require continued close monitoring of the following systems:   -CV: Monitor blood pressure and heart rate. Adjust medications as needed. History of hypertension  -Endocrine: Monitor glycemia and adjust pharmacologic regimen as needed.  -Respiratory: Respiratory therapy consult. Incentive spirometer every 2 hours while awake, as needed. To be followed by respiratory therapist as needed.  -Sleep: Monitor sleep cycles.   -Skin: Monitor incisions/wounds for adequate healing. Wound care coordinator consulted as needed. Frequent turning while in bed and repositioning in chair. Monitor for skin breakdown or erythema.  -Psychiatric: Monitor for signs and symptoms of depression. Monitor appetite. Monitor for  depression anxiety as the patient is adjusting to disability. Monitor and adjust medications as needed.  -Nutrition: Continue current diet and adjust as needed and as tolerated.  Consult dietitian for nutritional assessment and recommendations.  -ID: Patient will be monitored closely for any signs or symptoms of infection, such as elevated white blood cell count, increased temperature, signs of UTI.    -Pain: The patient will be monitored closely for signs and symptoms of pain. Pain regimen will be adjusted as needed.  -Cognitive function/mental status: Has the potential for residual deficits in orientation, processing, attention, thought organization, problem solving, reasoning, word retrieval, and memory given recent injury. Therefore, patient may require continued routine follow up primarily by Speech Language Pathology in addition to Occupational Therapy to address potential underlying deficits and working on higher level-cognitive function with compensatory strategies as indicated.  -Deep venous thromboembolism: patient is at increased risk for thromboembolic event given recent injury, new mobility limitations and current hospitalization. Therefore, maintain on mechanical DVT prophylaxis and encourage progressive out of mobility while continuing routine monitoring for potential thromboembolic events.  -Bowel management: increased potential for recurrent constipation given mobility limitations, current hospitalization, and medication use. Will need to continue routine monitoring of bowel function with appropriate adjustment in bowel regimen as indicated to ensure adequate bowel management.  Last BM (including prior to admit): 02/17/25  -Bladder management: Monitor urinary output and urinary function/dysfunction. Does have the potential for underlying bladder dysfunction given immobility. Therefore, requires continued routine monitoring with appropriate bladder management as indicated to avoid urinary tract

## 2025-02-21 NOTE — PROGRESS NOTES
Warfarin dosing per pharmacist    Enma Palumbo is a 78 y.o. female. Indication:  Afib    Goal INR:  2-3    Home dose: 2.5 mg daily     Risk factors or significant drug interactions: zosyn    Other anticoagulants:  no    Daily Monitoring  Date  INR     Warfarin dose HGB              Notes  7/1  2.2  2.5 mg  12.4  7/2  2.3  2.5 mg  ---  7/3  2.3  2.5 mg  10.9    Pharmacy consulted to assist in dosing warfarin for Ms. Green this admission. Patient's home dosing regimen confirmed with most recent outpatient anticoagulation notes. INR therapeutic today at 2.3. Since admission patient has been initiated on Zosyn which can elevate the INR, of note patient's albumin is also low. Will continue patient's home dose of 2.5 mg this evening. Daily INR's. Pharmacy will continue to monitor and make dose adjustments as clinically indicated.      Thank you,  Veena Candelaria, PharmD, [address]  Clinical Pharmacy Specialist  [phone] no

## 2025-02-24 ENCOUNTER — OFFICE VISIT (OUTPATIENT)
Dept: ORTHOPEDIC SURGERY | Age: 85
End: 2025-02-24

## 2025-02-24 DIAGNOSIS — S72.001D CLOSED FRACTURE OF RIGHT HIP WITH ROUTINE HEALING, SUBSEQUENT ENCOUNTER: Primary | ICD-10-CM

## 2025-02-24 PROCEDURE — 99024 POSTOP FOLLOW-UP VISIT: CPT | Performed by: PHYSICIAN ASSISTANT

## 2025-02-24 NOTE — PROGRESS NOTES
[facility]        Patient ID:  Name: Nancy Omalley  AGE/Gender: 85 y.o. female  MRN: [id number]  : 1940    Date of Service: 2025      ALLERGIES:   Allergies   Allergen Reactions    Sulfa Antibiotics Swelling     Other reaction(s): Hives/Swelling-Allergy  Eye swelling    Atorvastatin Other (See Comments)     Other reaction(s): Myalgia-Intolerance    Codeine Nausea Only     Other reaction(s): Nausea and/or vomiting-Intolerance    Hydromorphone Other (See Comments)     Other reaction(s): Nausea and/or vomiting-Intolerance    Morphine Other (See Comments)     Other reaction(s): Nausea and/or vomiting-Intolerance    Simvastatin Other (See Comments)     Other reaction(s): Myalgia-Intolerance        CC: Postop Hip Fracture     History:  The patient is seen today for follow-up of  the right hip. Underwent R femur FNS with Dr. Byers on 25. Was discharged to rehab. Has since moved to another facility. She is doing well overall. Pain is tolerable. She is using an ice pack for soreness.  Believes her strength is returning. She has been compliant with weight bearing restrictions.     Physical Exam:     General:  On exam the patient is a pleasant 85 y.o. female in no acute distress, A&O x 3.   Hips: On exam of  the hip. The wound appears to be healing well. Still with significant bruising. Mild swelling. No erythema.  Good strength with hip flexion and abduction.  Vascular: No distal edema or clubbing, Distal pulses are intact    Radiographs:     A/P pelvis and lateral view of the right hip demonstrates: Stable appearing right hip femoral neck system plate in good position. No other bony abnormalities   X-ray Diagnosis: Stable appearing right hip      Assessment and Plan:     The patient is doing well following surgery. Staples removed and steri strips placed.  Continue PT for strengthening. Continue WBAT for transfers only until follow up in 2 weeks.     Electronically signed by:   Tala

## 2025-03-04 ENCOUNTER — ANTI-COAG VISIT (OUTPATIENT)
Age: 85
End: 2025-03-04

## 2025-03-04 ENCOUNTER — TELEPHONE (OUTPATIENT)
Dept: ORTHOPEDIC SURGERY | Age: 85
End: 2025-03-04

## 2025-03-04 ENCOUNTER — TELEPHONE (OUTPATIENT)
Age: 85
End: 2025-03-04

## 2025-03-04 DIAGNOSIS — Z79.01 LONG TERM (CURRENT) USE OF ANTICOAGULANTS: Primary | ICD-10-CM

## 2025-03-04 DIAGNOSIS — I48.0 PAROXYSMAL ATRIAL FIBRILLATION (HCC): ICD-10-CM

## 2025-03-04 LAB — INR BLD: 1.3

## 2025-03-04 NOTE — TELEPHONE ENCOUNTER
Returned call to Lillian W/ALEJO and advised Per CAIO Villegas,WBAT for transfers. She can stand to get into wheelchair/chair. No ambulating. As far as dressing Lillian stated there was only steri strips and dry border dressing currently on pt. Advised once steri strips come off no dressing needed. Lillian voiced complete understanding. Lh

## 2025-03-04 NOTE — TELEPHONE ENCOUNTER
Lillian is calling to clarify WB status on the right leg. Her paperwork says WBAT for transfers but family is telling her no weight. Also on her incision, she wants to clarify the dressing change frequency.

## 2025-03-13 ENCOUNTER — ANTI-COAG VISIT (OUTPATIENT)
Age: 85
End: 2025-03-13
Payer: MEDICARE

## 2025-03-13 DIAGNOSIS — Z79.01 LONG TERM (CURRENT) USE OF ANTICOAGULANTS: Primary | ICD-10-CM

## 2025-03-13 DIAGNOSIS — I48.0 PAROXYSMAL ATRIAL FIBRILLATION (HCC): ICD-10-CM

## 2025-03-13 LAB
POC INR: 1.7
PROTHROMBIN TIME, POC: ABNORMAL

## 2025-03-13 PROCEDURE — 85610 PROTHROMBIN TIME: CPT | Performed by: INTERNAL MEDICINE

## 2025-03-13 PROCEDURE — 93793 ANTICOAG MGMT PT WARFARIN: CPT | Performed by: INTERNAL MEDICINE

## 2025-03-13 NOTE — PATIENT INSTRUCTIONS
Reminder: Please contact the [facility] at [phone] when you have medication changes. Examples, new medications, antibiotics, discontinued medications, new supplements, missed doses of warfarin or if you took extra doses of warfarin.  This also includes OTC medications. Notifying us helps reduce the possibility of high and low INR's. In addition, if warfarin needs to be held for any procedures, please have surgeon or physician's office contact us before holding anticoagulant. Thanks, [facility].         Please go to the Emergency Department if you experience:  - Extreme shortness of breath or chest pain  - Red, warm, painful, swollen limb  - Numbness or tingling on one side of the body  - Slurred speech or major vision change  - Extreme headache

## 2025-03-13 NOTE — PROGRESS NOTES
One time dose of 5 mg tonight instead of 2.5 mg. Then continue current maintenance plan (see Anticoag Dosing Calendar). INR to be rechecked in 2 week(s).

## 2025-03-18 ENCOUNTER — OFFICE VISIT (OUTPATIENT)
Dept: ORTHOPEDIC SURGERY | Age: 85
End: 2025-03-18

## 2025-03-18 DIAGNOSIS — S72.001D CLOSED FRACTURE OF RIGHT HIP WITH ROUTINE HEALING, SUBSEQUENT ENCOUNTER: Primary | ICD-10-CM

## 2025-03-18 PROCEDURE — 99024 POSTOP FOLLOW-UP VISIT: CPT | Performed by: ORTHOPAEDIC SURGERY

## 2025-03-18 NOTE — PROGRESS NOTES
Progress Note    Patient: Nancy Omalley MRN: [id number]  SSN: [id number]    YOB: 1940  Age: 85 y.o.  Sex: female        3/18/2025      Subjective:     Patient is here today in follow-up.  She is now about 5 weeks out or so from femoral neck system fixation of a right femoral neck fracture.  She says she feels much better she is not having a lot of pain in her right lower extremity.  Has been very compliant with just doing sort of some weightbearing as tolerated for transfers.  She has been getting some home health physical therapy    Objective:     There were no vitals filed for this visit.       Physical Exam:     Skin - incision is well healed with no redness or drainage  Motor and sensory function intact in RIGHT LOWER extremity  Pulses palpable in RIGHT LOWER extremity     XRAY FINDINGS:  [id number] right hip femoral neck proximal femur fracture, findings-AP and lateral views of the right hip shows the hardware is intact with no evidence of loosening or failure.  The overall alignment is excellent.  There does appear to be significant evidence of healing at the primary fracture lines impression-healing well aligned right femoral neck proximal femur fracture    Assessment:     6 weeks out from right femoral neck fracture    Plan:     I think at this point she can be weightbearing as tolerated.  She can be full active and passive range of motion of the right hip and full strength of the right hip.  And again she can be weightbearing as tolerated.  In short now she has no restrictions with her right lower extremity.  I will see her back in another 6 weeks or so with AP and lateral right hip on return she will be close to 3 months out at that time    Signed By: Henrique Byers MD     March 18, 2025

## 2025-03-26 ENCOUNTER — ANTI-COAG VISIT (OUTPATIENT)
Age: 85
End: 2025-03-26
Payer: MEDICARE

## 2025-03-26 DIAGNOSIS — I48.0 PAROXYSMAL ATRIAL FIBRILLATION (HCC): ICD-10-CM

## 2025-03-26 DIAGNOSIS — Z79.01 LONG TERM (CURRENT) USE OF ANTICOAGULANTS: Primary | ICD-10-CM

## 2025-03-26 LAB
POC INR: 3.1
PROTHROMBIN TIME, POC: NORMAL

## 2025-03-26 PROCEDURE — 93793 ANTICOAG MGMT PT WARFARIN: CPT | Performed by: INTERNAL MEDICINE

## 2025-03-26 PROCEDURE — 85610 PROTHROMBIN TIME: CPT | Performed by: INTERNAL MEDICINE

## 2025-03-31 ENCOUNTER — TELEPHONE (OUTPATIENT)
Dept: ORTHOPEDIC SURGERY | Age: 85
End: 2025-03-31

## 2025-03-31 NOTE — TELEPHONE ENCOUNTER
Lillian is calling to see if the patient can get some Tramadol because she is having trouble doing therapy due to pain with weightbearing. [facility] at [place] is correct

## 2025-04-01 DIAGNOSIS — S72.001D CLOSED FRACTURE OF RIGHT HIP WITH ROUTINE HEALING, SUBSEQUENT ENCOUNTER: Primary | ICD-10-CM

## 2025-04-01 RX ORDER — TRAMADOL HYDROCHLORIDE 50 MG/1
50 TABLET ORAL EVERY 6 HOURS PRN
Qty: 20 TABLET | Refills: 0 | Status: SHIPPED | OUTPATIENT
Start: 2025-04-01 | End: 2025-04-06

## 2025-04-02 ENCOUNTER — ANTI-COAG VISIT (OUTPATIENT)
Age: 85
End: 2025-04-02

## 2025-04-02 DIAGNOSIS — I48.0 PAROXYSMAL ATRIAL FIBRILLATION (HCC): ICD-10-CM

## 2025-04-02 DIAGNOSIS — Z79.01 LONG TERM (CURRENT) USE OF ANTICOAGULANTS: Primary | ICD-10-CM

## 2025-04-02 NOTE — PROGRESS NOTES
Patient started on Tramadol 50mg as needed. I will reduce her dose of Warfarin on Wednesdays to 1.25mg and recheck as scheduled on 04/16/2025

## 2025-04-16 ENCOUNTER — OFFICE VISIT (OUTPATIENT)
Age: 85
End: 2025-04-16
Payer: MEDICARE

## 2025-04-16 ENCOUNTER — ANTI-COAG VISIT (OUTPATIENT)
Age: 85
End: 2025-04-16

## 2025-04-16 VITALS
HEART RATE: 75 BPM | SYSTOLIC BLOOD PRESSURE: 112 MMHG | WEIGHT: 129 LBS | DIASTOLIC BLOOD PRESSURE: 58 MMHG | HEIGHT: 65 IN | BODY MASS INDEX: 21.49 KG/M2

## 2025-04-16 DIAGNOSIS — I48.0 PAROXYSMAL ATRIAL FIBRILLATION (HCC): Primary | ICD-10-CM

## 2025-04-16 DIAGNOSIS — Z79.01 LONG TERM (CURRENT) USE OF ANTICOAGULANTS: Primary | ICD-10-CM

## 2025-04-16 DIAGNOSIS — I48.0 PAROXYSMAL ATRIAL FIBRILLATION (HCC): ICD-10-CM

## 2025-04-16 LAB
POC INR: 1.3
PROTHROMBIN TIME, POC: ABNORMAL

## 2025-04-16 PROCEDURE — 1123F ACP DISCUSS/DSCN MKR DOCD: CPT | Performed by: INTERNAL MEDICINE

## 2025-04-16 PROCEDURE — 99214 OFFICE O/P EST MOD 30 MIN: CPT | Performed by: INTERNAL MEDICINE

## 2025-04-16 PROCEDURE — 1126F AMNT PAIN NOTED NONE PRSNT: CPT | Performed by: INTERNAL MEDICINE

## 2025-04-16 PROCEDURE — 93000 ELECTROCARDIOGRAM COMPLETE: CPT | Performed by: INTERNAL MEDICINE

## 2025-04-16 PROCEDURE — 1159F MED LIST DOCD IN RCRD: CPT | Performed by: INTERNAL MEDICINE

## 2025-04-16 PROCEDURE — 1090F PRES/ABSN URINE INCON ASSESS: CPT | Performed by: INTERNAL MEDICINE

## 2025-04-16 PROCEDURE — G8427 DOCREV CUR MEDS BY ELIG CLIN: HCPCS | Performed by: INTERNAL MEDICINE

## 2025-04-16 PROCEDURE — 1036F TOBACCO NON-USER: CPT | Performed by: INTERNAL MEDICINE

## 2025-04-16 PROCEDURE — G8420 CALC BMI NORM PARAMETERS: HCPCS | Performed by: INTERNAL MEDICINE

## 2025-04-16 PROCEDURE — 1160F RVW MEDS BY RX/DR IN RCRD: CPT | Performed by: INTERNAL MEDICINE

## 2025-04-16 RX ORDER — WARFARIN SODIUM 2.5 MG/1
2.5 TABLET ORAL
COMMUNITY

## 2025-04-16 ASSESSMENT — ENCOUNTER SYMPTOMS
CONSTIPATION: 0
SORE THROAT: 0
ABDOMINAL PAIN: 0
PHOTOPHOBIA: 0
ABDOMINAL DISTENTION: 0
COUGH: 0
DIARRHEA: 0
SHORTNESS OF BREATH: 0

## 2025-04-16 NOTE — PROGRESS NOTES
[facility] CARDIOLOGY  [address] [place] [address], [address]  [place] [address]  PHONE: [phone]        NAME:  Nancy Omalley  : 1940  MRN: [id number]     PCP:  Heron Liang MD      SUBJECTIVE:   Nancy Omalley is a 85 y.o. female seen for a follow up visit regarding the following:     Chief Complaint   Patient presents with    Atrial Fibrillation       HPI:    Doing well since last office visit without interval angina, syncope, CHF, or palpitations.  Burst of palpitations previously noted multiple office visits have now resolved after pacemaker setting changes last fall and and she is doing great, interrogation looks good today as noted below.    Nuclear stress test 10/9/2023:    Stress Test: A pharmacological stress test was performed using lexiscan.    ECG: Resting ECG demonstrates ventricular pacing.    Stress Function: Left ventricular function post-stress is normal. Post-stress ejection fraction is 62%.    Perfusion Comments: LV perfusion is normal.    Stress Combined Conclusion: The study is negative for myocardial ischemia. Findings suggest a low risk of cardiac events.    Holter monitor 2023:  Sinus rhythm with no atrial fibrillation or atrial flutter  PACs and PVCs with short burst of atrial tachycardia  Longer runs of either atrial tach or SVT, stay well-hydrated, continue meds and keep routine follow-up to discuss further options    Echocardiogram 2023:  Left Ventricle Mildly reduced left ventricular systolic function with a visually estimated EF of 45 - 50%. Left ventricle size is normal. Moderately increased wall thickness. Mild global hypokinesis present. Abnormal diastolic function.   Left Atrium Left atrium size is normal.   Right Ventricle Right ventricle size is normal. Lead present in the right ventricle. Normal systolic function.   Right Atrium Lead present in the right atrium. Right atrium size is normal.   Aortic Valve Trileaflet valve. Mild

## 2025-04-16 NOTE — PROGRESS NOTES
Warfarin tablet strength and weekly dosing schedule confirmed today.    Patient knows of no reason for subtherapeutic INR result. One time dose of 5 mg tonight instead of 1.25 mg. Then continue current maintenance plan (see Anticoag Dosing Calendar). INR to be rechecked in 2 week(s).

## 2025-04-16 NOTE — PATIENT INSTRUCTIONS
Reminder: Please contact the [facility] at [phone] when you have medication changes. Examples, new medications, antibiotics, discontinued medications, new supplements, missed doses of warfarin or if you took extra doses of warfarin.  This also includes OTC medications. Notifying us helps reduce the possibility of high and low INR's. In addition, if warfarin needs to be held for any procedures, please have surgeon or physician's office contact us before holding anticoagulant. Thanks, [facility].         Please go to the Emergency Department if you experience:  - Extreme shortness of breath or chest pain  - Red, warm, painful, swollen limb  - Numbness or tingling on one side of the body  - Slurred speech or major vision change  - Extreme headache

## 2025-04-21 RX ORDER — WARFARIN SODIUM 2.5 MG/1
TABLET ORAL
Qty: 90 TABLET | Refills: 3 | Status: SHIPPED | OUTPATIENT
Start: 2025-04-21

## 2025-04-21 RX ORDER — SOTALOL HYDROCHLORIDE 120 MG/1
120 TABLET ORAL 2 TIMES DAILY
Qty: 180 TABLET | Refills: 3 | Status: SHIPPED | OUTPATIENT
Start: 2025-04-21

## 2025-04-21 NOTE — TELEPHONE ENCOUNTER
MEDICATION REFILL REQUEST      Name of Medication:  Sotalol   Dose:  120 mg  Frequency:  twice a day   Quantity:    Days' supply:        Pharmacy Name/Location:  [facility]    MEDICATION REFILL REQUEST      Name of Medication:  jantoven   Dose:  2.5 mg  Frequency:  daily   Quantity:    Days' supply:        Pharmacy Name/Location:  [facility]

## 2025-04-21 NOTE — TELEPHONE ENCOUNTER
Requested Prescriptions     Pending Prescriptions Disp Refills    warfarin (COUMADIN) 2.5 MG tablet 90 tablet 3     Sig: Take 1 tablet daily or as directed by cardiology    sotalol (BETAPACE) 120 MG tablet 180 tablet 3     Sig: Take 1 tablet by mouth 2 times daily       Verified rx. Last OV 04/16/25. Erx to pharm on file.

## 2025-04-29 ENCOUNTER — OFFICE VISIT (OUTPATIENT)
Dept: ORTHOPEDIC SURGERY | Age: 85
End: 2025-04-29

## 2025-04-29 DIAGNOSIS — S72.001D CLOSED FRACTURE OF RIGHT HIP WITH ROUTINE HEALING, SUBSEQUENT ENCOUNTER: Primary | ICD-10-CM

## 2025-04-29 PROCEDURE — 99024 POSTOP FOLLOW-UP VISIT: CPT | Performed by: ORTHOPAEDIC SURGERY

## 2025-04-29 NOTE — PROGRESS NOTES
Progress Note    Patient: Nancy Omalley MRN: [id number]  SSN: [id number]    YOB: 1940  Age: 85 y.o.  Sex: female        4/29/2025      Subjective:     Patient is here today in follow-up.  She is approximate 11 weeks out from femoral neck system fixation of a right femoral neck fracture.  She seems that she is doing pretty well she says her hip is doing okay she does have some pain she describes in the posterior aspect of her thigh.  She really has this more over sort of her hamstring tendons than it really is in her groin or on the side of her hip.    Objective:     There were no vitals filed for this visit.       Physical Exam:     Skin - incision is well healed with no redness or drainage  Motor and sensory function intact in RIGHT LOWER extremity  Pulses palpable in RIGHT LOWER extremity     XRAY FINDINGS:  [id number] right hip femoral neck proximal femur fracture, findings-AP and lateral views of the right hip shows the hardware is intact with no evidence of loosening or failure.  The overall alignment is excellent.  There does appear to be significant evidence of healing at the primary fracture lines impression-healing well aligned right femoral neck proximal femur fracture    Assessment:     Healing right femoral neck fracture    Plan:     I am very pleased the way her hip looks today.  It does appear to be healing quite well and is pretty stable in its position.  It does have some mild shortening but in general it is healed in a pretty reasonable position and the hardware is intact.  Clinically she is also doing pretty well.  I have just recommended some Voltaren gel for her to try in the posterior aspect of her thigh and see if this helps.  I told her that we could try some compounding type cream but unfortunately using Medicare would not pay for this so I think would be wise to try the Voltaren gel first and see how much this helps.  As far as her activity is concerned she

## 2025-05-05 ENCOUNTER — ANTI-COAG VISIT (OUTPATIENT)
Age: 85
End: 2025-05-05
Payer: MEDICARE

## 2025-05-05 DIAGNOSIS — Z79.01 LONG TERM (CURRENT) USE OF ANTICOAGULANTS: Primary | ICD-10-CM

## 2025-05-05 DIAGNOSIS — I48.0 PAROXYSMAL ATRIAL FIBRILLATION (HCC): ICD-10-CM

## 2025-05-05 LAB
POC INR: 2.2
PROTHROMBIN TIME, POC: NORMAL

## 2025-05-05 PROCEDURE — 85610 PROTHROMBIN TIME: CPT | Performed by: INTERNAL MEDICINE

## 2025-05-05 PROCEDURE — 93793 ANTICOAG MGMT PT WARFARIN: CPT | Performed by: INTERNAL MEDICINE

## 2025-05-05 NOTE — PATIENT INSTRUCTIONS
Reminder: Please contact the [facility]madin Clinic at [phone] when you have medication changes. Examples, new medications, antibiotics, discontinued medications, new supplements, missed doses of warfarin or if you took extra doses of warfarin.  This also includes OTC medications. Notifying us helps reduce the possibility of high and low INR's. In addition, if warfarin needs to be held for any procedures, please have surgeon or physician's office contact us before holding anticoagulant. Thanks, [facility].       Please go to the Emergency Department if you experience:  - Extreme shortness of breath or chest pain  - Red, warm, painful, swollen limb  - Numbness or tingling on one side of the body  - Slurred speech or major vision change  - Extreme headache

## 2025-05-05 NOTE — PROGRESS NOTES
Therapeutic INR, patient to continue current maintenance plan (see Anticoag Dosing Calendar). INR to be rechecked in two week(s). Warfarin tablet strength and weekly dosing schedule confirmed today. Patient is taking cefpodoxime (vantin) for UTI

## 2025-05-20 ENCOUNTER — ANTI-COAG VISIT (OUTPATIENT)
Age: 85
End: 2025-05-20
Payer: MEDICARE

## 2025-05-20 DIAGNOSIS — I48.0 PAROXYSMAL ATRIAL FIBRILLATION (HCC): ICD-10-CM

## 2025-05-20 DIAGNOSIS — Z79.01 LONG TERM (CURRENT) USE OF ANTICOAGULANTS: Primary | ICD-10-CM

## 2025-05-20 LAB
POC INR: 1.8
PROTHROMBIN TIME, POC: NORMAL

## 2025-05-20 PROCEDURE — 93793 ANTICOAG MGMT PT WARFARIN: CPT | Performed by: INTERNAL MEDICINE

## 2025-05-20 PROCEDURE — 85610 PROTHROMBIN TIME: CPT | Performed by: INTERNAL MEDICINE

## 2025-06-03 ENCOUNTER — ANTI-COAG VISIT (OUTPATIENT)
Age: 85
End: 2025-06-03
Payer: MEDICARE

## 2025-06-03 DIAGNOSIS — I48.0 PAROXYSMAL ATRIAL FIBRILLATION (HCC): ICD-10-CM

## 2025-06-03 DIAGNOSIS — Z79.01 LONG TERM (CURRENT) USE OF ANTICOAGULANTS: Primary | ICD-10-CM

## 2025-06-03 LAB
POC INR: 2.2
PROTHROMBIN TIME, POC: NORMAL

## 2025-06-03 PROCEDURE — 93793 ANTICOAG MGMT PT WARFARIN: CPT | Performed by: INTERNAL MEDICINE

## 2025-06-03 PROCEDURE — 85610 PROTHROMBIN TIME: CPT | Performed by: INTERNAL MEDICINE

## 2025-06-20 ENCOUNTER — ANTI-COAG VISIT (OUTPATIENT)
Age: 85
End: 2025-06-20

## 2025-06-20 DIAGNOSIS — Z79.01 LONG TERM (CURRENT) USE OF ANTICOAGULANTS: Primary | ICD-10-CM

## 2025-06-20 DIAGNOSIS — I48.0 PAROXYSMAL ATRIAL FIBRILLATION (HCC): ICD-10-CM

## 2025-06-20 NOTE — PROGRESS NOTES
Patient started on Keflex 500mg twice daily for 7 days, will leave Warfarin dose the same and recheck as scheduled

## 2025-06-26 ENCOUNTER — OFFICE VISIT (OUTPATIENT)
Dept: ORTHOPEDIC SURGERY | Age: 85
End: 2025-06-26

## 2025-06-26 DIAGNOSIS — M70.61 TROCHANTERIC BURSITIS OF RIGHT HIP: ICD-10-CM

## 2025-06-26 DIAGNOSIS — S72.001D CLOSED FRACTURE OF RIGHT HIP WITH ROUTINE HEALING, SUBSEQUENT ENCOUNTER: Primary | ICD-10-CM

## 2025-06-26 RX ORDER — METHYLPREDNISOLONE ACETATE 80 MG/ML
80 INJECTION, SUSPENSION INTRA-ARTICULAR; INTRALESIONAL; INTRAMUSCULAR; SOFT TISSUE ONCE
Status: COMPLETED | OUTPATIENT
Start: 2025-06-26 | End: 2025-06-26

## 2025-06-26 RX ADMIN — METHYLPREDNISOLONE ACETATE 80 MG: 80 INJECTION, SUSPENSION INTRA-ARTICULAR; INTRALESIONAL; INTRAMUSCULAR; SOFT TISSUE at 12:05

## 2025-06-26 NOTE — PROGRESS NOTES
Progress Note    Patient: Nancy Omalley MRN: [id number]  SSN: [id number]    YOB: 1940  Age: 85 y.o.  Sex: female        6/26/2025      Subjective:     Patient is here today in follow-up.  She now about 4 months out from femoral access and fixation of her right femoral neck fracture.  She is still having a lot of pain but she says all of her pain seems to be sort of laterally.  So some of this she localizes right over the lateral aspect of her hip and she says it radiates down however she says it does seem to radiate all the way down to her foot at times and sometimes almost in the posterior aspect of her hip    Objective:     There were no vitals filed for this visit.       Physical Exam:     Skin - incision is well healed with no redness or drainage  Motor and sensory function intact in RIGHT LOWER extremity  Pulses palpable in RIGHT LOWER extremity   She definitely has significant tenderness to palpation over the trochanteric bursa area  XRAY FINDINGS:  [id number] right hip femoral neck proximal femur fracture, findings-AP and lateral views of the right hip shows the hardware is intact with no evidence of loosening or failure.  The overall alignment is excellent.  She does have a small amount of shortening measured this date appears to be about 10 mm or so.  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

## 2025-07-01 ENCOUNTER — TELEPHONE (OUTPATIENT)
Dept: ADMINISTRATIVE | Age: 85
End: 2025-07-01

## 2025-07-01 ENCOUNTER — ANTI-COAG VISIT (OUTPATIENT)
Age: 85
End: 2025-07-01
Payer: MEDICARE

## 2025-07-01 ENCOUNTER — TELEPHONE (OUTPATIENT)
Dept: ORTHOPEDIC SURGERY | Age: 85
End: 2025-07-01

## 2025-07-01 DIAGNOSIS — Z79.01 LONG TERM (CURRENT) USE OF ANTICOAGULANTS: Primary | ICD-10-CM

## 2025-07-01 DIAGNOSIS — I48.0 PAROXYSMAL ATRIAL FIBRILLATION (HCC): ICD-10-CM

## 2025-07-01 LAB
POC INR: 1.7
PROTHROMBIN TIME, POC: ABNORMAL

## 2025-07-01 PROCEDURE — 85610 PROTHROMBIN TIME: CPT | Performed by: INTERNAL MEDICINE

## 2025-07-01 NOTE — TELEPHONE ENCOUNTER
----- Message from Kimmie BARRETT sent at 7/1/2025 11:59 AM EDT -----  Regarding: Zeeshan Specialty Message to Provider  Specialty Message to Provider    Relationship to Patient: Self     Patient Message: patient said she had gotten an injection , helped for a few days, then it stopped  --------------------------------------------------------------------------------------------------------------------------    Call Back Information: OK to leave message on voicemail  Preferred Call Back Number: Phone [phone]

## 2025-07-01 NOTE — PROGRESS NOTES
Warfarin tablet strength and weekly dosing schedule confirmed today.    Pt was taking Keflex for 7 days and finished on Sunday.   One time dose of 5 mg tonight instead of 2.5 mg. Then continue current maintenance plan (see Anticoag Dosing Calendar). INR to be rechecked in 2 week(s).

## 2025-07-02 NOTE — TELEPHONE ENCOUNTER
LVM-pt to call back, if injection has not improved pain, Zeeshan Noted in last visit to have pt schedule with spine for Injection consideration. Will confirm with patient and send referral.

## 2025-07-03 NOTE — TELEPHONE ENCOUNTER
Pt called back and confirmed that the inj worked for a day or two. Patient would like to go ahead and see a spine provider for a inj.

## 2025-07-08 ENCOUNTER — ANTI-COAG VISIT (OUTPATIENT)
Age: 85
End: 2025-07-08
Payer: MEDICARE

## 2025-07-08 DIAGNOSIS — Z79.01 LONG TERM (CURRENT) USE OF ANTICOAGULANTS: Primary | ICD-10-CM

## 2025-07-08 DIAGNOSIS — I48.0 PAROXYSMAL ATRIAL FIBRILLATION (HCC): ICD-10-CM

## 2025-07-08 LAB
POC INR: 1.8
PROTHROMBIN TIME, POC: NORMAL

## 2025-07-08 PROCEDURE — 85610 PROTHROMBIN TIME: CPT | Performed by: INTERNAL MEDICINE

## 2025-07-08 PROCEDURE — 93793 ANTICOAG MGMT PT WARFARIN: CPT | Performed by: INTERNAL MEDICINE

## 2025-07-15 ENCOUNTER — ANTI-COAG VISIT (OUTPATIENT)
Age: 85
End: 2025-07-15
Payer: MEDICARE

## 2025-07-15 DIAGNOSIS — Z79.01 LONG TERM (CURRENT) USE OF ANTICOAGULANTS: Primary | ICD-10-CM

## 2025-07-15 DIAGNOSIS — I48.0 PAROXYSMAL ATRIAL FIBRILLATION (HCC): ICD-10-CM

## 2025-07-15 LAB
POC INR: 1.9
PROTHROMBIN TIME, POC: ABNORMAL

## 2025-07-15 PROCEDURE — 85610 PROTHROMBIN TIME: CPT | Performed by: INTERNAL MEDICINE

## 2025-07-15 PROCEDURE — 93793 ANTICOAG MGMT PT WARFARIN: CPT | Performed by: INTERNAL MEDICINE

## 2025-07-15 NOTE — PATIENT INSTRUCTIONS
Reminder: Please contact the Coumadin Clinic at [phone] when you have medication changes. Examples, new medications, antibiotics, discontinued medications, new supplements, missed doses of warfarin or if you took extra doses of warfarin.  This also includes OTC medications. Notifying us helps reduce the possibility of high and low INR's. In addition, if warfarin needs to be held for any procedures, please have surgeon or physician's office contact us before holding anticoagulant. Thanks, [facility].         Please go to the Emergency Department if you experience:  - Extreme shortness of breath or chest pain  - Red, warm, painful, swollen limb  - Numbness or tingling on one side of the body  - Slurred speech or major vision change  - Extreme headache

## 2025-07-15 NOTE — PROGRESS NOTES
Warfarin tablet strength and weekly dosing schedule confirmed today.    Patient knows of no reason for subtherapeutic INR result. Increased doses for the next two weeks. On Tue.Thurs.Sat pt will take 5mg instead of 2.5mg. Recheck in 2 weeks.

## 2025-07-22 ENCOUNTER — OFFICE VISIT (OUTPATIENT)
Dept: ORTHOPEDIC SURGERY | Age: 85
End: 2025-07-22
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
## 2025-07-22 DIAGNOSIS — M25.551 RIGHT HIP PAIN: Primary | ICD-10-CM

## 2025-07-22 PROCEDURE — 99024 POSTOP FOLLOW-UP VISIT: CPT | Performed by: ORTHOPAEDIC SURGERY

## 2025-07-22 NOTE — PROGRESS NOTES
Progress Note    Patient: Nancy Omalley MRN: [id number]  SSN: [id number]    YOB: 1940  Age: 85 y.o.  Sex: female        7/22/2025      Subjective:     Patient is here today in follow-up.  She is a few weeks out from getting a injection into her right hip.  This was into the area around her trochanteric bursa.  This does not like it helped a good bit but she still having some pain in her right lower extremity and she does say it seems to go on the backside of her hip and down into her leg.  I had spoken with her about the fact that the thought that we would try the injection and then if this persisted we probably would see if the spine team could see her to see if they thought she was a candidate for an epidural steroid injection.  She says that her primary care doctor saw her as well and also thought that she likely had some sort of radicular symptoms from her lower back and might benefit from an epidural steroid injection.  She also has had a recent fall and she did feel like she needed to be seen just to make sure that there was nothing that happened from her recent fall    Objective:     There were no vitals filed for this visit.       Physical Exam:     Skin - incision is well healed with no redness or drainage  Motor and sensory function intact in RIGHT LOWER extremity  Pulses palpable in RIGHT LOWER extremity     XRAY FINDINGS:  [id number]-tv right hip femoral neck proximal femur fracture, findings-AP and lateral views of the right hip shows the hardware is intact with no evidence of loosening or failure.  The overall alignment is excellent.  There does not appear to be any evidence of new fracture lines either of the proximal femur or the pelvis itself.  There does appear to be significant evidence of healing at the primary fracture lines impression-healing well aligned right femoral neck proximal femur fracture    Assessment:     #1 healing right femoral neck fracture, #2-right

## 2025-07-31 ENCOUNTER — ANTI-COAG VISIT (OUTPATIENT)
Age: 85
End: 2025-07-31
Payer: MEDICARE

## 2025-07-31 DIAGNOSIS — I48.0 PAROXYSMAL ATRIAL FIBRILLATION (HCC): ICD-10-CM

## 2025-07-31 DIAGNOSIS — Z79.01 LONG TERM (CURRENT) USE OF ANTICOAGULANTS: Primary | ICD-10-CM

## 2025-07-31 LAB
POC INR: 2.4
PROTHROMBIN TIME, POC: NORMAL

## 2025-07-31 PROCEDURE — 85610 PROTHROMBIN TIME: CPT | Performed by: INTERNAL MEDICINE

## 2025-07-31 PROCEDURE — 93793 ANTICOAG MGMT PT WARFARIN: CPT | Performed by: INTERNAL MEDICINE

## 2025-08-08 ENCOUNTER — TELEPHONE (OUTPATIENT)
Dept: ORTHOPEDIC SURGERY | Age: 85
End: 2025-08-08

## 2025-08-08 ENCOUNTER — OFFICE VISIT (OUTPATIENT)
Dept: ORTHOPEDIC SURGERY | Age: 85
End: 2025-08-08

## 2025-08-08 DIAGNOSIS — M47.816 LUMBAR SPONDYLOSIS: Primary | ICD-10-CM

## 2025-08-08 DIAGNOSIS — M54.16 LUMBAR RADICULOPATHY: ICD-10-CM

## 2025-08-08 DIAGNOSIS — M21.372 LEFT FOOT DROP: ICD-10-CM

## 2025-08-15 ENCOUNTER — CLINICAL DOCUMENTATION (OUTPATIENT)
Age: 85
End: 2025-08-15

## 2025-08-15 ENCOUNTER — ANTI-COAG VISIT (OUTPATIENT)
Age: 85
End: 2025-08-15
Payer: MEDICARE

## 2025-08-15 DIAGNOSIS — I48.0 PAROXYSMAL ATRIAL FIBRILLATION (HCC): ICD-10-CM

## 2025-08-15 DIAGNOSIS — Z79.01 LONG TERM (CURRENT) USE OF ANTICOAGULANTS: Primary | ICD-10-CM

## 2025-08-15 LAB
POC INR: 3.4
PROTHROMBIN TIME, POC: ABNORMAL

## 2025-08-15 PROCEDURE — 85610 PROTHROMBIN TIME: CPT | Performed by: INTERNAL MEDICINE

## 2025-08-15 PROCEDURE — 93793 ANTICOAG MGMT PT WARFARIN: CPT | Performed by: INTERNAL MEDICINE

## 2025-08-22 ENCOUNTER — OFFICE VISIT (OUTPATIENT)
Dept: ORTHOPEDIC SURGERY | Age: 85
End: 2025-08-22

## 2025-08-22 DIAGNOSIS — M54.16 LUMBAR RADICULOPATHY: Primary | ICD-10-CM

## 2025-08-22 RX ORDER — TRIAMCINOLONE ACETONIDE 40 MG/ML
40 INJECTION, SUSPENSION INTRA-ARTICULAR; INTRAMUSCULAR ONCE
Status: COMPLETED | OUTPATIENT
Start: 2025-08-22 | End: 2025-08-22

## 2025-08-22 RX ADMIN — TRIAMCINOLONE ACETONIDE 40 MG: 40 INJECTION, SUSPENSION INTRA-ARTICULAR; INTRAMUSCULAR at 11:38

## 2025-08-25 ENCOUNTER — OFFICE VISIT (OUTPATIENT)
Dept: VASCULAR SURGERY | Age: 85
End: 2025-08-25
Payer: MEDICARE

## 2025-08-25 VITALS
WEIGHT: 123 LBS | HEIGHT: 65 IN | OXYGEN SATURATION: 96 % | DIASTOLIC BLOOD PRESSURE: 75 MMHG | SYSTOLIC BLOOD PRESSURE: 152 MMHG | HEART RATE: 75 BPM | BODY MASS INDEX: 20.49 KG/M2

## 2025-08-25 DIAGNOSIS — I73.9 PAD (PERIPHERAL ARTERY DISEASE): Primary | ICD-10-CM

## 2025-08-25 PROCEDURE — G8427 DOCREV CUR MEDS BY ELIG CLIN: HCPCS | Performed by: STUDENT IN AN ORGANIZED HEALTH CARE EDUCATION/TRAINING PROGRAM

## 2025-08-25 PROCEDURE — 1090F PRES/ABSN URINE INCON ASSESS: CPT | Performed by: STUDENT IN AN ORGANIZED HEALTH CARE EDUCATION/TRAINING PROGRAM

## 2025-08-25 PROCEDURE — 1159F MED LIST DOCD IN RCRD: CPT | Performed by: STUDENT IN AN ORGANIZED HEALTH CARE EDUCATION/TRAINING PROGRAM

## 2025-08-25 PROCEDURE — G8420 CALC BMI NORM PARAMETERS: HCPCS | Performed by: STUDENT IN AN ORGANIZED HEALTH CARE EDUCATION/TRAINING PROGRAM

## 2025-08-25 PROCEDURE — 1036F TOBACCO NON-USER: CPT | Performed by: STUDENT IN AN ORGANIZED HEALTH CARE EDUCATION/TRAINING PROGRAM

## 2025-08-25 PROCEDURE — 99203 OFFICE O/P NEW LOW 30 MIN: CPT | Performed by: STUDENT IN AN ORGANIZED HEALTH CARE EDUCATION/TRAINING PROGRAM

## 2025-08-25 PROCEDURE — 1123F ACP DISCUSS/DSCN MKR DOCD: CPT | Performed by: STUDENT IN AN ORGANIZED HEALTH CARE EDUCATION/TRAINING PROGRAM

## 2025-08-29 ENCOUNTER — ANTI-COAG VISIT (OUTPATIENT)
Age: 85
End: 2025-08-29
Payer: MEDICARE

## 2025-08-29 DIAGNOSIS — Z79.01 LONG TERM (CURRENT) USE OF ANTICOAGULANTS: Primary | ICD-10-CM

## 2025-08-29 DIAGNOSIS — I48.0 PAROXYSMAL ATRIAL FIBRILLATION (HCC): ICD-10-CM

## 2025-08-29 LAB
POC INR: 1.9
PROTHROMBIN TIME, POC: NORMAL

## 2025-08-29 PROCEDURE — 93793 ANTICOAG MGMT PT WARFARIN: CPT | Performed by: INTERNAL MEDICINE

## 2025-08-29 PROCEDURE — 85610 PROTHROMBIN TIME: CPT | Performed by: INTERNAL MEDICINE

## 2025-09-05 ENCOUNTER — TELEPHONE (OUTPATIENT)
Dept: ORTHOPEDIC SURGERY | Age: 85
End: 2025-09-05

## 2025-09-05 DIAGNOSIS — M54.16 LUMBAR RADICULOPATHY: Primary | ICD-10-CM

## 2025-09-05 DIAGNOSIS — M47.816 LUMBAR SPONDYLOSIS: ICD-10-CM

## (undated) DEVICE — GLOVE SURG SZ 8 L12IN FNGR THK79MIL GRN LTX FREE

## (undated) DEVICE — SOLUTION IRRIGATION BAL SALT SOLUTION 500 ML BTL 6/CA BSS +

## (undated) DEVICE — INTENDED FOR TISSUE SEPARATION, AND OTHER PROCEDURES THAT REQUIRE A SHARP SURGICAL BLADE TO PUNCTURE OR CUT.: Brand: BARD-PARKER ® STAINLESS STEEL BLADES

## (undated) DEVICE — 3M™ IOBAN™ 2 ANTIMICROBIAL INCISE DRAPE 6650EZ: Brand: IOBAN™ 2

## (undated) DEVICE — BIT DRL L413MM DIA4.3MM FOR FEM NK SYSTEN

## (undated) DEVICE — IMMOBILIZER KNEE PREMIER PRO TRI PNL 24INCH FOAM TIETEX PAT

## (undated) DEVICE — BETADINE 5% EYE SOL

## (undated) DEVICE — GUIDEWIRE ORTH L400MM DIA3.2MM FOR TFN

## (undated) DEVICE — DRAPE,TOP,102X53,STERILE: Brand: MEDLINE

## (undated) DEVICE — LOWER EXTREMITY: Brand: MEDLINE INDUSTRIES, INC.

## (undated) DEVICE — SURGICAL PROCEDURE PACK EYE CDS

## (undated) DEVICE — HANDPIECE SET WITH COAXIAL HIGH FLOW TIP AND SUCTION TUBE: Brand: INTERPULSE

## (undated) DEVICE — MICROSURGICAL INSTRUMENT 25GA SOFT TIP CANNULA, DSP, 0.8MM: Brand: ALCON

## (undated) DEVICE — SYRINGE MED 5ML STD CLR PLAS LUERLOCK TIP N CTRL DISP

## (undated) DEVICE — SUTURE MONOCRYL SZ 2-0 L27IN ABSRB UD SH L26MM TAPERPOINT NDL Y417H

## (undated) DEVICE — CRADLE POS 3X5X24IN RASPBERRY ARM PRONE FOAM DISP

## (undated) DEVICE — PAD,EYE,1-5/8X2 5/8,STERILE,LF,1/PK: Brand: MEDLINE

## (undated) DEVICE — DIATHERMY PROBE DSP, 25G: Brand: ALCON GRIESHABER

## (undated) DEVICE — KIT,ANTI FOG,W/SPONGE & FLUID,SOFT PACK: Brand: MEDLINE

## (undated) DEVICE — 3M™ TEGADERM™ TRANSPARENT FILM DRESSING FRAME STYLE, 1626W, 4 IN X 4-3/4 IN (10 CM X 12 CM), 50/CT 4CT/CASE: Brand: 3M™ TEGADERM™

## (undated) DEVICE — YANKAUER,BULB TIP,W/O VENT,RIGID,STERILE: Brand: MEDLINE

## (undated) DEVICE — GLOVE SURG SZ 8 CRM LTX FREE POLYISOPRENE POLYMER BEAD ANTI

## (undated) DEVICE — SUTURE ABSORBABLE MONOFILAMENT 1 CTX 36 CM 48 MM VIO PDS +

## (undated) DEVICE — GOWN,REINF,POLY,ECL,PP SLV,XL: Brand: MEDLINE

## (undated) DEVICE — CONSTELLATION VISION SYSTEM 7500 CPM ULTRAVIT PROBE STRAIGHT ENDOILLUMINATOR 25+ TOTALPLUS VITRECTOMY PAK EDGEPLUS BLADE VALVED ENTRY SYSTEM: Brand: CONSTELLATION, ULTRAVIT, 25+, TOTALPLUS, EDGEPLUS

## (undated) DEVICE — GLOVE ORANGE PI 7 1/2   MSG9075

## (undated) DEVICE — SUTURE ABSORBABLE MONOFILAMENT 3-0 PS 24 CM 26 MM VIO PDS +

## (undated) DEVICE — 7 DAY SILVER-COATED ANTIMICROBIAL BARRIER DRESSING: Brand: ACTICOAT 7  4" X 5"

## (undated) DEVICE — Device: Brand: MILLEX-OR

## (undated) DEVICE — SOLUTION IRRIG 1000ML 0.9% SOD CHL USP POUR PLAS BTL

## (undated) DEVICE — SYRINGE, LUER LOCK, 60ML: Brand: MEDLINE

## (undated) DEVICE — 3M™ STERI-DRAPE™ INSTRUMENT POUCH 1018: Brand: STERI-DRAPE™

## (undated) DEVICE — DISPOSABLE BIPOLAR CODE, 12' (3.66 M): Brand: CONMED

## (undated) DEVICE — 25 GA ILLUMINATED FLEXIBLE CURVED LASER PROBE: Brand: ALCON, ENGAUGE